# Patient Record
Sex: FEMALE | Race: WHITE
[De-identification: names, ages, dates, MRNs, and addresses within clinical notes are randomized per-mention and may not be internally consistent; named-entity substitution may affect disease eponyms.]

---

## 2017-02-21 ENCOUNTER — HOSPITAL ENCOUNTER (INPATIENT)
Dept: HOSPITAL 23 - CED | Age: 42
LOS: 2 days | Discharge: HOME | DRG: 205 | End: 2017-02-23
Admitting: INTERNAL MEDICINE
Payer: MEDICARE

## 2017-02-21 DIAGNOSIS — I13.2: ICD-10-CM

## 2017-02-21 DIAGNOSIS — E87.5: ICD-10-CM

## 2017-02-21 DIAGNOSIS — I27.2: ICD-10-CM

## 2017-02-21 DIAGNOSIS — G43.909: ICD-10-CM

## 2017-02-21 DIAGNOSIS — Z88.2: ICD-10-CM

## 2017-02-21 DIAGNOSIS — F17.210: ICD-10-CM

## 2017-02-21 DIAGNOSIS — I42.0: ICD-10-CM

## 2017-02-21 DIAGNOSIS — K44.9: ICD-10-CM

## 2017-02-21 DIAGNOSIS — M31.1: ICD-10-CM

## 2017-02-21 DIAGNOSIS — Z99.2: ICD-10-CM

## 2017-02-21 DIAGNOSIS — Z82.49: ICD-10-CM

## 2017-02-21 DIAGNOSIS — Z91.041: ICD-10-CM

## 2017-02-21 DIAGNOSIS — E87.2: ICD-10-CM

## 2017-02-21 DIAGNOSIS — R10.9: ICD-10-CM

## 2017-02-21 DIAGNOSIS — I50.32: ICD-10-CM

## 2017-02-21 DIAGNOSIS — Z84.1: ICD-10-CM

## 2017-02-21 DIAGNOSIS — Z88.5: ICD-10-CM

## 2017-02-21 DIAGNOSIS — N18.6: ICD-10-CM

## 2017-02-21 DIAGNOSIS — K21.9: ICD-10-CM

## 2017-02-21 DIAGNOSIS — R07.89: ICD-10-CM

## 2017-02-21 DIAGNOSIS — J98.11: Primary | ICD-10-CM

## 2017-02-21 LAB
%MB: 2 % (ref 0–4)
BARBITURATES UR QL SCN: 0.7 MG/DL (ref 0.2–2)
BARBITURATES UR QL SCN: 3.7 G/DL (ref 3.5–5)
BASOPHIL#: 0.1 X10E3 (ref 0–0.3)
BASOPHIL%: 0.8 % (ref 0–2.5)
BENZODIAZ UR QL SCN: 11 U/L (ref 10–40)
BENZODIAZ UR QL SCN: 18 U/L (ref 10–42)
BLOOD UREA NITROGEN: 68 MG/DL (ref 9–23)
BUN/CREATININE RATIO: 4.56
BZE UR QL SCN: 78 U/L (ref 32–92)
CALCIUM SERUM: 9 MG/DL (ref 8.4–10.2)
CK MB SERPL-RTO: 18.4 % (ref 11–15.5)
CK MB SERPL-RTO: 31.7 G/DL (ref 30–36)
CK TOTAL: 70 IU/L (ref 26–140)
CREATININE SERUM: 14.9 MG/DL (ref 0.6–1.4)
DIFF IND: NO
EOSINOPHIL#: 0.1 X10E3 (ref 0–0.7)
EOSINOPHIL%: 1.3 % (ref 0–7)
GENTAMICIN PEAK SERPL-MCNC: NO MG/L
GLOM FILT RATE ESTIMATED: 2.9 ML/MIN (ref 60–?)
GLUCOSE FASTING: 94 MG/DL (ref 70–110)
HEMATOCRIT: 37.1 % (ref 35–45)
HEMOGLOBIN: 11.8 GM/DL (ref 12–16)
KETONES UR QL: 20 MMOL/L (ref 22–31)
KETONES UR QL: 99 MMOL/L (ref 100–111)
LYMPHOCYTE#: 1.1 X10E3 (ref 1–3.5)
LYMPHOCYTE%: 15.4 % (ref 17–45)
MB: 1.4 NG/ML
MEAN CELL VOLUME: 96.3 FL (ref 83–96)
MEAN CORPUSCULAR HEMOGLOBIN: 30.6 PG (ref 28–34)
MEAN PLATELET VOLUME: 9.1 FL (ref 6.5–11.5)
METHADONE UR QL SCN: <1 NG/ML (ref 0–7.9)
MONOCYTE#: 0.7 X10E3 (ref 0–1)
MONOCYTE%: 9.4 % (ref 3–12)
NEUTROPHIL#: 5.2 X10E3 (ref 1.5–7.1)
NEUTROPHIL%: 73.1 % (ref 40–75)
PLATELET COUNT: 120 X10E3 (ref 140–420)
POC - TROPONIN: <0.05 NG/ML (ref ?–0.05)
POTASSIUM: 5.9 MMOL/L (ref 3.5–5.1)
PROTEIN TOTAL SERUM: 7 G/DL (ref 6–8.3)
RED BLOOD COUNT: 3.86 X10E (ref 3.9–5.3)
SODIUM: 133 MMOL/L (ref 135–145)
URBCS1 AUWI: (no result) /[HPF] (ref 0–2)
URINE APPEARANCE: CLEAR
URINE BACTERIA AUWI: (no result)
URINE BILIRUBIN: (no result)
URINE BLOOD: (no result)
URINE COLOR: YELLOW
URINE GLUCOSE: (no result) MG/DL
URINE KETONE: (no result)
URINE LEUKOCYTE ESTERASE: (no result)
URINE NITRATE: (no result)
URINE PH: 7.5 (ref 5–8)
URINE PROTEIN: (no result)
URINE SOURCE: (no result)
URINE SPECIFIC GRAVITY: 1.01 (ref 1–1.03)
URINE SQUAMOUS EPITHELIAL CELL: (no result) /[HPF]
URINE UROBILINOGEN: 0.2 MG/DL
UWBCS1 AUWI: (no result) (ref 0–5)
WHITE BLOOD COUNT: 7.1 X10E3 (ref 4–10.5)

## 2017-02-21 PROCEDURE — 5A1D00Z: ICD-10-PCS | Performed by: INTERNAL MEDICINE

## 2017-02-22 LAB
BLOOD UREA NITROGEN: 21 MG/DL (ref 9–23)
BUN/CREATININE RATIO: 2.76
CALCIUM SERUM: 9 MG/DL (ref 8.4–10.2)
CHOLESTEROL: 183 MG/DL (ref 0–200)
CK MB SERPL-RTO: 18.4 % (ref 11–15.5)
CK MB SERPL-RTO: 32.4 G/DL (ref 30–36)
CREATININE SERUM: 7.6 MG/DL (ref 0.6–1.4)
GLOM FILT RATE ESTIMATED: 6.3 ML/MIN (ref 60–?)
GLUCOSE FASTING: 94 MG/DL (ref 70–110)
HDL CHOLESTEROL: 41 MG/DL (ref 35–95)
HEMATOCRIT: 39.5 % (ref 35–45)
HEMOGLOBIN: 12.8 GM/DL (ref 12–16)
KETONES UR QL: 29 MMOL/L (ref 22–31)
KETONES UR QL: 96 MMOL/L (ref 100–111)
LDL CHOLESTEROL: 126 MG/DL (ref ?–130)
LDL/HDL RATIO: 3 RATIO (ref 0–4)
MEAN CELL VOLUME: 95.4 FL (ref 83–96)
MEAN CORPUSCULAR HEMOGLOBIN: 30.9 PG (ref 28–34)
MEAN PLATELET VOLUME: 8.8 FL (ref 6.5–11.5)
PLATELET COUNT: 109 X10E3 (ref 140–420)
POTASSIUM: 4.6 MMOL/L (ref 3.5–5.1)
RED BLOOD COUNT: 4.14 X10E (ref 3.9–5.3)
SODIUM: 138 MMOL/L (ref 135–145)
TRIGLYCERIDES: 79 MG/DL (ref 10–160)
WHITE BLOOD COUNT: 5.3 X10E3 (ref 4–10.5)

## 2017-02-23 LAB
BLOOD UREA NITROGEN: 18 MG/DL (ref 9–23)
BUN/CREATININE RATIO: 2.81
CALCIUM SERUM: 9 MG/DL (ref 8.4–10.2)
CK MB SERPL-RTO: 17.9 % (ref 11–15.5)
CK MB SERPL-RTO: 32.1 G/DL (ref 30–36)
CREATININE SERUM: 6.4 MG/DL (ref 0.6–1.4)
GLOM FILT RATE ESTIMATED: 7.7 ML/MIN (ref 60–?)
GLUCOSE FASTING: 97 MG/DL (ref 70–110)
HEMATOCRIT: 38.8 % (ref 35–45)
HEMOGLOBIN: 12.5 GM/DL (ref 12–16)
KETONES UR QL: 28 MMOL/L (ref 22–31)
KETONES UR QL: 98 MMOL/L (ref 100–111)
MEAN CELL VOLUME: 95.6 FL (ref 83–96)
MEAN CORPUSCULAR HEMOGLOBIN: 30.7 PG (ref 28–34)
MEAN PLATELET VOLUME: 9.1 FL (ref 6.5–11.5)
PLATELET COUNT: 105 X10E3 (ref 140–420)
POTASSIUM: 4.3 MMOL/L (ref 3.5–5.1)
RED BLOOD COUNT: 4.06 X10E (ref 3.9–5.3)
SODIUM: 140 MMOL/L (ref 135–145)
WHITE BLOOD COUNT: 5.6 X10E3 (ref 4–10.5)

## 2017-03-21 ENCOUNTER — HOSPITAL ENCOUNTER (INPATIENT)
Dept: HOSPITAL 23 - CED | Age: 42
LOS: 5 days | Discharge: HOME | DRG: 690 | End: 2017-03-26
Admitting: INTERNAL MEDICINE
Payer: MEDICARE

## 2017-03-21 DIAGNOSIS — N12: Primary | ICD-10-CM

## 2017-03-21 DIAGNOSIS — Z90.49: ICD-10-CM

## 2017-03-21 DIAGNOSIS — E87.5: ICD-10-CM

## 2017-03-21 DIAGNOSIS — G89.29: ICD-10-CM

## 2017-03-21 DIAGNOSIS — K44.9: ICD-10-CM

## 2017-03-21 DIAGNOSIS — G43.909: ICD-10-CM

## 2017-03-21 DIAGNOSIS — I27.2: ICD-10-CM

## 2017-03-21 DIAGNOSIS — R19.7: ICD-10-CM

## 2017-03-21 DIAGNOSIS — Z90.710: ICD-10-CM

## 2017-03-21 DIAGNOSIS — Z82.49: ICD-10-CM

## 2017-03-21 DIAGNOSIS — Z72.0: ICD-10-CM

## 2017-03-21 DIAGNOSIS — B96.20: ICD-10-CM

## 2017-03-21 DIAGNOSIS — I12.0: ICD-10-CM

## 2017-03-21 DIAGNOSIS — Z84.1: ICD-10-CM

## 2017-03-21 DIAGNOSIS — K21.9: ICD-10-CM

## 2017-03-21 DIAGNOSIS — N18.6: ICD-10-CM

## 2017-03-21 DIAGNOSIS — E83.39: ICD-10-CM

## 2017-03-21 DIAGNOSIS — Z86.711: ICD-10-CM

## 2017-03-21 LAB
BARBITURATES UR QL SCN: 0.4 MG/DL (ref 0.2–2)
BARBITURATES UR QL SCN: 3.7 G/DL (ref 3.5–5)
BASOPHIL#: 0.1 X10E3 (ref 0–0.3)
BASOPHIL%: 0.8 % (ref 0–2.5)
BENZODIAZ UR QL SCN: 14 U/L (ref 10–40)
BENZODIAZ UR QL SCN: 18 U/L (ref 10–42)
BLOOD UREA NITROGEN: 50 MG/DL (ref 9–23)
BUN/CREATININE RATIO: 5.26
BZE UR QL SCN: 72 U/L (ref 32–92)
CALCIUM SERUM: 9.2 MG/DL (ref 8.4–10.2)
CK MB SERPL-RTO: 18.9 % (ref 11–15.5)
CK MB SERPL-RTO: 31.3 G/DL (ref 30–36)
CREATININE SERUM: 9.5 MG/DL (ref 0.6–1.4)
DIFF IND: NO
EOSINOPHIL#: 0.1 X10E3 (ref 0–0.7)
EOSINOPHIL%: 1.8 % (ref 0–7)
GENTAMICIN PEAK SERPL-MCNC: YES MG/L
GLOM FILT RATE ESTIMATED: 4.9 ML/MIN (ref 60–?)
GLUCOSE FASTING: 89 MG/DL (ref 70–110)
HEMATOCRIT: 38.3 % (ref 35–45)
HEMOGLOBIN: 12 GM/DL (ref 12–16)
HIV1+2 AB SPEC QL IA.RAPID: 25.7 SECONDS (ref 23.5–31.3)
INFLUENZA A: (no result)
INFLUENZA B: (no result)
INR: 1
KETONES UR QL: 100 MMOL/L (ref 100–111)
KETONES UR QL: 27 MMOL/L (ref 22–31)
LYMPHOCYTE#: 1.4 X10E3 (ref 1–3.5)
LYMPHOCYTE%: 20.2 % (ref 17–45)
MEAN CELL VOLUME: 91.9 FL (ref 83–96)
MEAN CORPUSCULAR HEMOGLOBIN: 28.8 PG (ref 28–34)
MEAN PLATELET VOLUME: 9 FL (ref 6.5–11.5)
METHADONE UR QL SCN: <1 NG/ML (ref 0–7.9)
MONOCYTE#: 0.5 X10E3 (ref 0–1)
MONOCYTE%: 7.7 % (ref 3–12)
NEUTROPHIL#: 4.9 X10E3 (ref 1.5–7.1)
NEUTROPHIL%: 69.5 % (ref 40–75)
PLATELET COUNT: 123 X10E3 (ref 140–420)
POC - TROPONIN: <0.05 NG/ML (ref ?–0.05)
POTASSIUM: 4.9 MMOL/L (ref 3.5–5.1)
PROTEIN TOTAL SERUM: 7.4 G/DL (ref 6–8.3)
PROTHROMBIN TIME (PATIENT): 10.8 SECONDS (ref 9.6–11.5)
RED BLOOD COUNT: 4.17 X10E (ref 3.9–5.3)
SODIUM: 138 MMOL/L (ref 135–145)
U HYALINE CASTS AUWI: (no result) /[LPF]
URINE APPEARANCE: (no result)
URINE BACTERIA AUWI: (no result)
URINE BILIRUBIN: (no result)
URINE BLOOD: (no result)
URINE COLOR: YELLOW
URINE GLUCOSE: (no result) MG/DL
URINE KETONE: (no result)
URINE LEUKOCYTE ESTERASE: (no result)
URINE NITRATE: (no result)
URINE PH: 8.5 (ref 5–8)
URINE PROTEIN: (no result)
URINE SOURCE: (no result)
URINE SPECIFIC GRAVITY: 1.01 (ref 1–1.03)
URINE SQUAMOUS EPITHELIAL CELL: (no result) /[HPF]
URINE UROBILINOGEN: 0.2 MG/DL
UWBCS1 AUWI: (no result) (ref 0–5)
WHITE BLOOD COUNT: 7.1 X10E3 (ref 4–10.5)

## 2017-03-21 PROCEDURE — A9567 TECHNETIUM TC-99M AEROSOL: HCPCS

## 2017-03-21 PROCEDURE — A9540 TC99M MAA: HCPCS

## 2017-03-22 LAB
BASOPHIL#: 0 X10E3 (ref 0–0.3)
BASOPHIL%: 0.8 % (ref 0–2.5)
BLOOD UREA NITROGEN: 55 MG/DL (ref 9–23)
BUN/CREATININE RATIO: 5.39
CALCIUM SERUM: 9 MG/DL (ref 8.4–10.2)
CK MB SERPL-RTO: 18.5 % (ref 11–15.5)
CK MB SERPL-RTO: 31.2 G/DL (ref 30–36)
CREATININE SERUM: 10.2 MG/DL (ref 0.6–1.4)
DIFF IND: NO
EOSINOPHIL#: 0.1 X10E3 (ref 0–0.7)
EOSINOPHIL%: 1.7 % (ref 0–7)
GLOM FILT RATE ESTIMATED: 4.5 ML/MIN (ref 60–?)
GLUCOSE FASTING: 111 MG/DL (ref 70–110)
HEMATOCRIT: 35.4 % (ref 35–45)
HEMOGLOBIN: 11 GM/DL (ref 12–16)
KETONES UR QL: 28 MMOL/L (ref 22–31)
KETONES UR QL: 99 MMOL/L (ref 100–111)
LYMPHOCYTE#: 1.4 X10E3 (ref 1–3.5)
LYMPHOCYTE%: 21.8 % (ref 17–45)
MEAN CELL VOLUME: 93 FL (ref 83–96)
MEAN CORPUSCULAR HEMOGLOBIN: 29 PG (ref 28–34)
MEAN PLATELET VOLUME: 9.1 FL (ref 6.5–11.5)
MONOCYTE#: 0.5 X10E3 (ref 0–1)
MONOCYTE%: 7.6 % (ref 3–12)
NEUTROPHIL#: 4.3 X10E3 (ref 1.5–7.1)
NEUTROPHIL%: 68.1 % (ref 40–75)
PLATELET COUNT: 109 X10E3 (ref 140–420)
POTASSIUM: 4.6 MMOL/L (ref 3.5–5.1)
RED BLOOD COUNT: 3.81 X10E (ref 3.9–5.3)
SODIUM: 137 MMOL/L (ref 135–145)
WHITE BLOOD COUNT: 6.2 X10E3 (ref 4–10.5)

## 2017-03-22 PROCEDURE — 02HV33Z INSERTION OF INFUSION DEVICE INTO SUPERIOR VENA CAVA, PERCUTANEOUS APPROACH: ICD-10-PCS | Performed by: INTERNAL MEDICINE

## 2017-03-22 PROCEDURE — 5A1D60Z: ICD-10-PCS | Performed by: INTERNAL MEDICINE

## 2017-03-23 LAB
BASOPHIL#: 0 X10E3 (ref 0–0.3)
BASOPHIL%: 0.7 % (ref 0–2.5)
BLOOD UREA NITROGEN: 33 MG/DL (ref 9–23)
BUN/CREATININE RATIO: 4.64
CALCIUM SERUM: 8.8 MG/DL (ref 8.4–10.2)
CK MB SERPL-RTO: 18.6 % (ref 11–15.5)
CK MB SERPL-RTO: 30.7 G/DL (ref 30–36)
CREATININE SERUM: 7.1 MG/DL (ref 0.6–1.4)
DIFF IND: NO
EOSINOPHIL#: 0.1 X10E3 (ref 0–0.7)
EOSINOPHIL%: 1.3 % (ref 0–7)
GLOM FILT RATE ESTIMATED: 6.8 ML/MIN (ref 60–?)
GLUCOSE FASTING: 92 MG/DL (ref 70–110)
HEMATOCRIT: 37.8 % (ref 35–45)
HEMOGLOBIN: 11.6 GM/DL (ref 12–16)
KETONES UR QL: 101 MMOL/L (ref 100–111)
KETONES UR QL: 25 MMOL/L (ref 22–31)
LYMPHOCYTE#: 1.2 X10E3 (ref 1–3.5)
LYMPHOCYTE%: 18 % (ref 17–45)
MEAN CELL VOLUME: 93.3 FL (ref 83–96)
MEAN CORPUSCULAR HEMOGLOBIN: 28.7 PG (ref 28–34)
MEAN PLATELET VOLUME: 10 FL (ref 6.5–11.5)
MONOCYTE#: 0.6 X10E3 (ref 0–1)
MONOCYTE%: 8.6 % (ref 3–12)
NEUTROPHIL#: 4.6 X10E3 (ref 1.5–7.1)
NEUTROPHIL%: 71.4 % (ref 40–75)
PLATELET COUNT: 115 X10E3 (ref 140–420)
POTASSIUM: 5.8 MMOL/L (ref 3.5–5.1)
RED BLOOD COUNT: 4.05 X10E (ref 3.9–5.3)
SODIUM: 136 MMOL/L (ref 135–145)
WHITE BLOOD COUNT: 6.5 X10E3 (ref 4–10.5)

## 2017-03-24 LAB
BASOPHIL#: 0 X10E3 (ref 0–0.3)
BASOPHIL%: 0.6 % (ref 0–2.5)
BLOOD UREA NITROGEN: 48 MG/DL (ref 9–23)
BUN/CREATININE RATIO: 5.1
CALCIUM SERUM: 8.4 MG/DL (ref 8.4–10.2)
CK MB SERPL-RTO: 18.5 % (ref 11–15.5)
CK MB SERPL-RTO: 31.5 G/DL (ref 30–36)
CREATININE SERUM: 9.4 MG/DL (ref 0.6–1.4)
DIFF IND: NO
EOSINOPHIL#: 0.2 X10E3 (ref 0–0.7)
EOSINOPHIL%: 2.7 % (ref 0–7)
GLOM FILT RATE ESTIMATED: 4.7 ML/MIN (ref 60–?)
GLUCOSE FASTING: 93 MG/DL (ref 70–110)
HEMATOCRIT: 34.9 % (ref 35–45)
HEMOGLOBIN: 11 GM/DL (ref 12–16)
HEPATITIS B SURFACE ANTIBODY: <5 MIU/ML (ref 10–?)
KETONES UR QL: 24 MMOL/L (ref 22–31)
KETONES UR QL: 99 MMOL/L (ref 100–111)
LYMPHOCYTE#: 1.5 X10E3 (ref 1–3.5)
LYMPHOCYTE%: 21.2 % (ref 17–45)
MEAN CELL VOLUME: 91.8 FL (ref 83–96)
MEAN CORPUSCULAR HEMOGLOBIN: 28.9 PG (ref 28–34)
MEAN PLATELET VOLUME: 8.9 FL (ref 6.5–11.5)
MONOCYTE#: 0.5 X10E3 (ref 0–1)
MONOCYTE%: 7.5 % (ref 3–12)
NEUTROPHIL#: 4.9 X10E3 (ref 1.5–7.1)
NEUTROPHIL%: 68 % (ref 40–75)
PHOSPHOROUS: 5.9 MG/DL (ref 2.5–4.6)
PLATELET COUNT: 109 X10E3 (ref 140–420)
POTASSIUM: 5.6 MMOL/L (ref 3.5–5.1)
RED BLOOD COUNT: 3.8 X10E (ref 3.9–5.3)
SODIUM: 134 MMOL/L (ref 135–145)
WHITE BLOOD COUNT: 7.1 X10E3 (ref 4–10.5)

## 2017-03-25 LAB
BLOOD UREA NITROGEN: 23 MG/DL (ref 9–23)
BUN/CREATININE RATIO: 3.83
CALCIUM SERUM: 9 MG/DL (ref 8.4–10.2)
CREATININE SERUM: 6 MG/DL (ref 0.6–1.4)
GLOM FILT RATE ESTIMATED: 8.1 ML/MIN (ref 60–?)
GLUCOSE FASTING: 94 MG/DL (ref 70–110)
KETONES UR QL: 100 MMOL/L (ref 100–111)
KETONES UR QL: 28 MMOL/L (ref 22–31)
MAGNESIUM: 1.9 MG/DL (ref 1.6–3)
PHOSPHOROUS: 4.8 MG/DL (ref 2.5–4.6)
POTASSIUM: 4.6 MMOL/L (ref 3.5–5.1)
SODIUM: 138 MMOL/L (ref 135–145)

## 2017-03-26 LAB
BASOPHIL#: 0.1 X10E3 (ref 0–0.3)
BASOPHIL%: 1.2 % (ref 0–2.5)
BLOOD UREA NITROGEN: 35 MG/DL (ref 9–23)
BUN/CREATININE RATIO: 4.32
CALCIUM SERUM: 9 MG/DL (ref 8.4–10.2)
CK MB SERPL-RTO: 17.9 % (ref 11–15.5)
CK MB SERPL-RTO: 31.2 G/DL (ref 30–36)
CREATININE SERUM: 8.1 MG/DL (ref 0.6–1.4)
DIFF IND: NO
EOSINOPHIL#: 0.2 X10E3 (ref 0–0.7)
EOSINOPHIL%: 4.2 % (ref 0–7)
GLOM FILT RATE ESTIMATED: 5.6 ML/MIN (ref 60–?)
GLUCOSE FASTING: 107 MG/DL (ref 70–110)
HEMATOCRIT: 35.7 % (ref 35–45)
HEMOGLOBIN: 11.1 GM/DL (ref 12–16)
KETONES UR QL: 103 MMOL/L (ref 100–111)
KETONES UR QL: 26 MMOL/L (ref 22–31)
LYMPHOCYTE#: 1.2 X10E3 (ref 1–3.5)
LYMPHOCYTE%: 23 % (ref 17–45)
MEAN CELL VOLUME: 91.8 FL (ref 83–96)
MEAN CORPUSCULAR HEMOGLOBIN: 28.6 PG (ref 28–34)
MEAN PLATELET VOLUME: 9.8 FL (ref 6.5–11.5)
MONOCYTE#: 0.5 X10E3 (ref 0–1)
MONOCYTE%: 9.4 % (ref 3–12)
NEUTROPHIL#: 3.3 X10E3 (ref 1.5–7.1)
NEUTROPHIL%: 62.2 % (ref 40–75)
PLATELET COUNT: 101 X10E3 (ref 140–420)
POTASSIUM: 4.8 MMOL/L (ref 3.5–5.1)
RED BLOOD COUNT: 3.89 X10E (ref 3.9–5.3)
SODIUM: 139 MMOL/L (ref 135–145)
WHITE BLOOD COUNT: 5.4 X10E3 (ref 4–10.5)

## 2017-04-06 ENCOUNTER — INPATIENT HOSPITAL (OUTPATIENT)
Dept: URBAN - METROPOLITAN AREA HOSPITAL 107 | Facility: HOSPITAL | Age: 42
End: 2017-04-06

## 2017-04-06 DIAGNOSIS — R10.32 LEFT LOWER QUADRANT PAIN: ICD-10-CM

## 2017-04-06 DIAGNOSIS — R93.3 ABNORMAL FINDINGS ON DIAGNOSTIC IMAGING OF OTHER PARTS OF DI: ICD-10-CM

## 2017-04-06 PROCEDURE — 99223 1ST HOSP IP/OBS HIGH 75: CPT

## 2017-05-07 ENCOUNTER — HOSPITAL ENCOUNTER (OUTPATIENT)
Dept: HOSPITAL 23 - CED | Age: 42
Setting detail: OBSERVATION
LOS: 3 days | Discharge: HOME | End: 2017-05-10
Attending: INTERNAL MEDICINE | Admitting: FAMILY MEDICINE
Payer: MEDICARE

## 2017-05-07 DIAGNOSIS — Z91.041: ICD-10-CM

## 2017-05-07 DIAGNOSIS — I13.2: ICD-10-CM

## 2017-05-07 DIAGNOSIS — J98.4: ICD-10-CM

## 2017-05-07 DIAGNOSIS — N39.0: ICD-10-CM

## 2017-05-07 DIAGNOSIS — Z98.890: ICD-10-CM

## 2017-05-07 DIAGNOSIS — Z88.5: ICD-10-CM

## 2017-05-07 DIAGNOSIS — Z88.1: ICD-10-CM

## 2017-05-07 DIAGNOSIS — I31.3: ICD-10-CM

## 2017-05-07 DIAGNOSIS — R07.89: Primary | ICD-10-CM

## 2017-05-07 DIAGNOSIS — M41.86: ICD-10-CM

## 2017-05-07 DIAGNOSIS — Z90.49: ICD-10-CM

## 2017-05-07 DIAGNOSIS — Z88.8: ICD-10-CM

## 2017-05-07 DIAGNOSIS — M31.1: ICD-10-CM

## 2017-05-07 DIAGNOSIS — Z88.2: ICD-10-CM

## 2017-05-07 DIAGNOSIS — Z79.899: ICD-10-CM

## 2017-05-07 DIAGNOSIS — I50.30: ICD-10-CM

## 2017-05-07 DIAGNOSIS — J98.11: ICD-10-CM

## 2017-05-07 DIAGNOSIS — F17.210: ICD-10-CM

## 2017-05-07 DIAGNOSIS — K44.9: ICD-10-CM

## 2017-05-07 DIAGNOSIS — G43.909: ICD-10-CM

## 2017-05-07 DIAGNOSIS — D63.1: ICD-10-CM

## 2017-05-07 DIAGNOSIS — B95.2: ICD-10-CM

## 2017-05-07 DIAGNOSIS — A41.89: ICD-10-CM

## 2017-05-07 DIAGNOSIS — Z90.710: ICD-10-CM

## 2017-05-07 DIAGNOSIS — Z82.49: ICD-10-CM

## 2017-05-07 DIAGNOSIS — K21.9: ICD-10-CM

## 2017-05-07 DIAGNOSIS — N18.6: ICD-10-CM

## 2017-05-07 DIAGNOSIS — I51.7: ICD-10-CM

## 2017-05-07 DIAGNOSIS — Z99.2: ICD-10-CM

## 2017-05-07 DIAGNOSIS — Z84.1: ICD-10-CM

## 2017-05-07 LAB
ALCOHOL BLOOD: <5 MG/DL
BARBITURATES UR QL SCN: 0.5 MG/DL (ref 0.2–2)
BARBITURATES UR QL SCN: 3.9 G/DL (ref 3.5–5)
BARBITURATES: (no result)
BASOPHIL#: 0.1 X10E3 (ref 0–0.3)
BASOPHIL%: 1.5 % (ref 0–2.5)
BENZODIAZ UR QL SCN: 18 U/L (ref 10–40)
BENZODIAZ UR QL SCN: 21 U/L (ref 10–42)
BENZODIAZEPINES: (no result)
BLOOD UREA NITROGEN: 46 MG/DL (ref 9–23)
BUN/CREATININE RATIO: 5.05
BZE UR QL SCN: 95 U/L (ref 32–92)
CALCIUM SERUM: 9.4 MG/DL (ref 8.4–10.2)
CK MB SERPL-RTO: 20.1 % (ref 11–15.5)
CK MB SERPL-RTO: 31.8 G/DL (ref 30–36)
CK TOTAL: 30 IU/L (ref 26–140)
CK TOTAL: 31 IU/L (ref 26–140)
COCAINE: (no result)
CREATININE SERUM: 9.1 MG/DL (ref 0.6–1.4)
DIFF IND: NO
DX ICD CODE: (no result)
DX ICD CODE: (no result)
EOSINOPHIL#: 0.1 X10E3 (ref 0–0.7)
EOSINOPHIL%: 2 % (ref 0–7)
GENTAMICIN PEAK SERPL-MCNC: YES MG/L
GLOM FILT RATE ESTIMATED: 4.8 ML/MIN (ref 60–?)
GLUCOSE FASTING: 124 MG/DL (ref 70–110)
HEMATOCRIT: 37.9 % (ref 35–45)
HEMOGLOBIN: 12 GM/DL (ref 12–16)
HIV1+2 AB SPEC QL IA.RAPID: 20.9 SECONDS (ref 23.5–31.3)
INR: 1
KETONES UR QL: 24 MMOL/L (ref 22–31)
KETONES UR QL: 93 MMOL/L (ref 100–111)
LIPASE: 66 U/L (ref 22–51)
LYMPHOCYTE#: 1.9 X10E3 (ref 1–3.5)
LYMPHOCYTE%: 28 % (ref 17–45)
MEAN CELL VOLUME: 94.4 FL (ref 83–96)
MEAN CORPUSCULAR HEMOGLOBIN: 30 PG (ref 28–34)
MEAN PLATELET VOLUME: 8.9 FL (ref 6.5–11.5)
METHADONE UR QL SCN: <1 NG/ML (ref 0–7.9)
METHADONE UR QL SCN: <1 NG/ML (ref 0–7.9)
MONOCYTE#: 0.7 X10E3 (ref 0–1)
MONOCYTE%: 10.2 % (ref 3–12)
NEUTROPHIL#: 4 X10E3 (ref 1.5–7.1)
NEUTROPHIL%: 58.3 % (ref 40–75)
OPIATES: (no result)
PLATELET COUNT: 143 X10E3 (ref 140–420)
POC - TROPONIN: <0.05 NG/ML (ref ?–0.05)
POC - TROPONIN: <0.05 NG/ML (ref ?–0.05)
POTASSIUM: 4.1 MMOL/L (ref 3.5–5.1)
PROTEIN TOTAL SERUM: 7.4 G/DL (ref 6–8.3)
PROTHROMBIN TIME (PATIENT): 10.4 SECONDS (ref 9.6–11.5)
RED BLOOD COUNT: 4.02 X10E (ref 3.9–5.3)
SODIUM: 133 MMOL/L (ref 135–145)
TRICYCLIC ANTIDEPRESSANTS: (no result)
U METHADONE: (no result)
URBCS1 AUWI: (no result) /[HPF] (ref 0–2)
URINE AMORPHOUS SEDIMENT: (no result)
URINE APPEARANCE: (no result)
URINE BACTERIA AUWI: (no result)
URINE BILIRUBIN: (no result)
URINE BLOOD: (no result)
URINE COLOR: YELLOW
URINE GLUCOSE: (no result) MG/DL
URINE KETONE: (no result)
URINE LEUKOCYTE ESTERASE: (no result)
URINE NITRATE: (no result)
URINE PH: 7 (ref 5–8)
URINE PROTEIN: (no result)
URINE SOURCE: (no result)
URINE SPECIFIC GRAVITY: 1.01 (ref 1–1.03)
URINE SQUAMOUS EPITHELIAL CELL: (no result) /[HPF]
URINE UROBILINOGEN: 1 MG/DL
UWBCS1 AUWI: (no result) (ref 0–5)
WHITE BLOOD COUNT: 6.8 X10E3 (ref 4–10.5)

## 2017-05-07 PROCEDURE — G0378 HOSPITAL OBSERVATION PER HR: HCPCS

## 2017-05-07 PROCEDURE — G0480 DRUG TEST DEF 1-7 CLASSES: HCPCS

## 2017-05-07 PROCEDURE — C9113 INJ PANTOPRAZOLE SODIUM, VIA: HCPCS

## 2017-05-08 LAB
AMYLASE: 40 U/L (ref 0–46)
BLOOD UREA NITROGEN: 60 MG/DL (ref 9–23)
BUN/CREATININE RATIO: 5.4
CALCIUM SERUM: 8.6 MG/DL (ref 8.4–10.2)
CK MB SERPL-RTO: 20.2 % (ref 11–15.5)
CK MB SERPL-RTO: 31.8 G/DL (ref 30–36)
CREATININE SERUM: 11.1 MG/DL (ref 0.6–1.4)
GLOM FILT RATE ESTIMATED: 3.8 ML/MIN (ref 60–?)
GLUCOSE FASTING: 101 MG/DL (ref 70–110)
HEMATOCRIT: 37.6 % (ref 35–45)
HEMOGLOBIN: 11.9 GM/DL (ref 12–16)
KETONES UR QL: 25 MMOL/L (ref 22–31)
KETONES UR QL: 95 MMOL/L (ref 100–111)
LIPASE: 35 U/L (ref 22–51)
MEAN CELL VOLUME: 94.9 FL (ref 83–96)
MEAN CORPUSCULAR HEMOGLOBIN: 30.1 PG (ref 28–34)
MEAN PLATELET VOLUME: 9 FL (ref 6.5–11.5)
PLATELET COUNT: 128 X10E3 (ref 140–420)
POTASSIUM: 5.9 MMOL/L (ref 3.5–5.1)
RED BLOOD COUNT: 3.96 X10E (ref 3.9–5.3)
SODIUM: 134 MMOL/L (ref 135–145)
WHITE BLOOD COUNT: 7.3 X10E3 (ref 4–10.5)

## 2017-07-07 ENCOUNTER — HOSPITAL ENCOUNTER (INPATIENT)
Dept: HOSPITAL 23 - CED | Age: 42
LOS: 6 days | Discharge: HOME | DRG: 314 | End: 2017-07-13
Attending: INTERNAL MEDICINE | Admitting: FAMILY MEDICINE
Payer: MEDICARE

## 2017-07-07 DIAGNOSIS — Z91.15: ICD-10-CM

## 2017-07-07 DIAGNOSIS — I31.9: Primary | ICD-10-CM

## 2017-07-07 DIAGNOSIS — I50.32: ICD-10-CM

## 2017-07-07 DIAGNOSIS — Z90.710: ICD-10-CM

## 2017-07-07 DIAGNOSIS — N39.0: ICD-10-CM

## 2017-07-07 DIAGNOSIS — B95.2: ICD-10-CM

## 2017-07-07 DIAGNOSIS — Z88.2: ICD-10-CM

## 2017-07-07 DIAGNOSIS — I13.2: ICD-10-CM

## 2017-07-07 DIAGNOSIS — M94.0: ICD-10-CM

## 2017-07-07 DIAGNOSIS — Z99.2: ICD-10-CM

## 2017-07-07 DIAGNOSIS — D63.1: ICD-10-CM

## 2017-07-07 DIAGNOSIS — Z86.711: ICD-10-CM

## 2017-07-07 DIAGNOSIS — R07.89: ICD-10-CM

## 2017-07-07 DIAGNOSIS — N18.6: ICD-10-CM

## 2017-07-07 DIAGNOSIS — G89.4: ICD-10-CM

## 2017-07-07 DIAGNOSIS — Z90.49: ICD-10-CM

## 2017-07-07 DIAGNOSIS — G43.909: ICD-10-CM

## 2017-07-07 DIAGNOSIS — Z72.0: ICD-10-CM

## 2017-07-07 DIAGNOSIS — K21.9: ICD-10-CM

## 2017-07-07 LAB
AMYLASE: 54 U/L (ref 0–46)
BARBITURATES UR QL SCN: 0.7 MG/DL (ref 0.2–2)
BARBITURATES UR QL SCN: 3.8 G/DL (ref 3.5–5)
BASOPHIL#: 0.1 X10E3 (ref 0–0.3)
BASOPHIL%: 1.6 % (ref 0–2.5)
BENZODIAZ UR QL SCN: 16 U/L (ref 10–42)
BENZODIAZ UR QL SCN: 25 U/L (ref 10–40)
BLOOD UREA NITROGEN: 37 MG/DL (ref 9–23)
BUN/CREATININE RATIO: 4.15
BZE UR QL SCN: 92 U/L (ref 32–92)
CALCIUM SERUM: 8.4 MG/DL (ref 8.4–10.2)
CHOLESTEROL: 174 MG/DL (ref 0–200)
CK MB SERPL-RTO: 19.4 % (ref 11–15.5)
CK MB SERPL-RTO: 32.1 G/DL (ref 30–36)
CREATININE SERUM: 8.9 MG/DL (ref 0.6–1.4)
DIFF IND: NO
EOSINOPHIL#: 0.1 X10E3 (ref 0–0.7)
EOSINOPHIL%: 0.8 % (ref 0–7)
GENTAMICIN PEAK SERPL-MCNC: YES MG/L
GLOM FILT RATE ESTIMATED: 5 ML/MIN (ref 60–?)
GLUCOSE FASTING: 68 MG/DL (ref 70–110)
HDL CHOLESTEROL: 45 MG/DL (ref 35–95)
HEMATOCRIT: 26.9 % (ref 35–45)
HEMOGLOBIN: 8.6 GM/DL (ref 12–16)
KETONES UR QL: 29 MMOL/L (ref 22–31)
KETONES UR QL: 92 MMOL/L (ref 100–111)
LDL CHOLESTEROL: 117 MG/DL (ref ?–130)
LDL/HDL RATIO: 3 RATIO (ref 0–4)
LIPASE: 60 U/L (ref 22–51)
LYMPHOCYTE#: 1 X10E3 (ref 1–3.5)
LYMPHOCYTE%: 15.1 % (ref 17–45)
MAGNESIUM: 1.9 MG/DL (ref 1.6–3)
MEAN CELL VOLUME: 100 FL (ref 83–96)
MEAN CORPUSCULAR HEMOGLOBIN: 32.1 PG (ref 28–34)
MEAN PLATELET VOLUME: 8.9 FL (ref 6.5–11.5)
METHADONE UR QL SCN: <1 NG/ML (ref 0–7.9)
MONOCYTE#: 0.6 X10E3 (ref 0–1)
MONOCYTE%: 10.1 % (ref 3–12)
NEUTROPHIL#: 4.6 X10E3 (ref 1.5–7.1)
NEUTROPHIL%: 72.4 % (ref 40–75)
PHOSPHOROUS: 5.1 MG/DL (ref 2.5–4.6)
PLATELET COUNT: 152 X10E3 (ref 140–420)
POC - TROPONIN: <0.05 NG/ML (ref ?–0.05)
POTASSIUM: 3.6 MMOL/L (ref 3.5–5.1)
PROTEIN TOTAL SERUM: 7.5 G/DL (ref 6–8.3)
RED BLOOD COUNT: 2.69 X10E (ref 3.9–5.3)
SODIUM: 133 MMOL/L (ref 135–145)
TRIGLYCERIDES: 60 MG/DL (ref 10–160)
URBCS1 AUWI: (no result) /[HPF] (ref 0–2)
URINE APPEARANCE: (no result)
URINE BACTERIA AUWI: (no result)
URINE BILIRUBIN: (no result)
URINE BLOOD: (no result)
URINE COLOR: YELLOW
URINE GLUCOSE: (no result) MG/DL
URINE KETONE: (no result)
URINE LEUKOCYTE ESTERASE: (no result)
URINE NITRATE: (no result)
URINE PH: 8.5 (ref 5–8)
URINE PROTEIN: (no result)
URINE SOURCE: (no result)
URINE SPECIFIC GRAVITY: 1.01 (ref 1–1.03)
URINE SQUAMOUS EPITHELIAL CELL: (no result) /[HPF]
URINE UROBILINOGEN: 0.2 MG/DL
UWBCS1 AUWI: (no result) (ref 0–5)
WHITE BLOOD COUNT: 6.3 X10E3 (ref 4–10.5)

## 2017-07-07 PROCEDURE — C9113 INJ PANTOPRAZOLE SODIUM, VIA: HCPCS

## 2017-07-07 PROCEDURE — G0257 UNSCHED DIALYSIS ESRD PT HOS: HCPCS

## 2017-07-08 LAB
AMYLASE: 39 U/L (ref 0–46)
BARBITURATES UR QL SCN: 0.8 MG/DL (ref 0.2–2)
BARBITURATES UR QL SCN: 3.8 G/DL (ref 3.5–5)
BENZODIAZ UR QL SCN: 28 U/L (ref 10–40)
BENZODIAZ UR QL SCN: 28 U/L (ref 10–42)
BLOOD UREA NITROGEN: 14 MG/DL (ref 9–23)
BUN/CREATININE RATIO: 2.85
BZE UR QL SCN: 110 U/L (ref 32–92)
CALCIUM SERUM: 8.8 MG/DL (ref 8.4–10.2)
CK MB SERPL-RTO: 19.1 % (ref 11–15.5)
CK MB SERPL-RTO: 31.9 G/DL (ref 30–36)
CK TOTAL: 33 IU/L (ref 26–140)
CREATININE SERUM: 4.9 MG/DL (ref 0.6–1.4)
GLOM FILT RATE ESTIMATED: 10.2 ML/MIN (ref 60–?)
GLUCOSE FASTING: 80 MG/DL (ref 70–110)
HEMATOCRIT: 31.2 % (ref 35–45)
HEMOGLOBIN: 9.9 GM/DL (ref 12–16)
KETONES UR QL: 30 MMOL/L (ref 22–31)
KETONES UR QL: 95 MMOL/L (ref 100–111)
LIPASE: 26 U/L (ref 22–51)
MAGNESIUM: 2 MG/DL (ref 1.6–3)
MEAN CELL VOLUME: 100.3 FL (ref 83–96)
MEAN CORPUSCULAR HEMOGLOBIN: 32 PG (ref 28–34)
MEAN PLATELET VOLUME: 8.8 FL (ref 6.5–11.5)
PHOSPHOROUS: 3.9 MG/DL (ref 2.5–4.6)
PLATELET COUNT: 169 X10E3 (ref 140–420)
POTASSIUM: 4.4 MMOL/L (ref 3.5–5.1)
PROTEIN TOTAL SERUM: 8.1 G/DL (ref 6–8.3)
RED BLOOD COUNT: 3.11 X10E (ref 3.9–5.3)
SODIUM: 136 MMOL/L (ref 135–145)
WHITE BLOOD COUNT: 7.4 X10E3 (ref 4–10.5)

## 2017-07-09 LAB
BLOOD UREA NITROGEN: 18 MG/DL (ref 9–23)
BUN/CREATININE RATIO: 2.81
CALCIUM SERUM: 8.2 MG/DL (ref 8.4–10.2)
CK MB SERPL-RTO: 20 % (ref 11–15.5)
CK MB SERPL-RTO: 32 G/DL (ref 30–36)
CREATININE SERUM: 6.4 MG/DL (ref 0.6–1.4)
GLOM FILT RATE ESTIMATED: 7.4 ML/MIN (ref 60–?)
GLUCOSE FASTING: 110 MG/DL (ref 70–110)
HEMATOCRIT: 27.3 % (ref 35–45)
HEMOGLOBIN: 8.7 GM/DL (ref 12–16)
KETONES UR QL: 29 MMOL/L (ref 22–31)
KETONES UR QL: 94 MMOL/L (ref 100–111)
MEAN CELL VOLUME: 100.6 FL (ref 83–96)
MEAN CORPUSCULAR HEMOGLOBIN: 32.2 PG (ref 28–34)
MEAN PLATELET VOLUME: 8.9 FL (ref 6.5–11.5)
PLATELET COUNT: 148 X10E3 (ref 140–420)
POTASSIUM: 3.8 MMOL/L (ref 3.5–5.1)
RED BLOOD COUNT: 2.71 X10E (ref 3.9–5.3)
SODIUM: 135 MMOL/L (ref 135–145)
WHITE BLOOD COUNT: 5.5 X10E3 (ref 4–10.5)

## 2017-07-10 PROCEDURE — 5A1D60Z: ICD-10-PCS | Performed by: INTERNAL MEDICINE

## 2017-07-11 LAB
URBCS1 AUWI: (no result) /[HPF] (ref 0–2)
URINE APPEARANCE: (no result)
URINE BACTERIA AUWI: (no result)
URINE BILIRUBIN: (no result)
URINE BLOOD: (no result)
URINE COLOR: YELLOW
URINE GLUCOSE: (no result) MG/DL
URINE KETONE: (no result)
URINE LEUKOCYTE ESTERASE: (no result)
URINE NITRATE: (no result)
URINE PH: 8 (ref 5–8)
URINE PROTEIN: (no result)
URINE SOURCE: (no result)
URINE SPECIFIC GRAVITY: 1.01 (ref 1–1.03)
URINE SQUAMOUS EPITHELIAL CELL: (no result) /[HPF]
URINE UROBILINOGEN: 0.2 MG/DL
UWBCS1 AUWI: (no result) (ref 0–5)

## 2017-07-12 LAB
BASOPHIL#: 0 X10E3 (ref 0–0.3)
BASOPHIL%: 0.8 % (ref 0–2.5)
BLOOD UREA NITROGEN: 39 MG/DL (ref 9–23)
BUN/CREATININE RATIO: 4.38
CALCIUM SERUM: 8.7 MG/DL (ref 8.4–10.2)
CK MB SERPL-RTO: 19.4 % (ref 11–15.5)
CK MB SERPL-RTO: 32.3 G/DL (ref 30–36)
CREATININE SERUM: 8.9 MG/DL (ref 0.6–1.4)
DIFF IND: NO
EOSINOPHIL#: 0.1 X10E3 (ref 0–0.7)
EOSINOPHIL%: 1.3 % (ref 0–7)
GLOM FILT RATE ESTIMATED: 5 ML/MIN (ref 60–?)
GLUCOSE FASTING: 93 MG/DL (ref 70–110)
HEMATOCRIT: 31 % (ref 35–45)
HEMOGLOBIN: 10 GM/DL (ref 12–16)
KETONES UR QL: 24 MMOL/L (ref 22–31)
KETONES UR QL: 97 MMOL/L (ref 100–111)
LYMPHOCYTE#: 1.3 X10E3 (ref 1–3.5)
LYMPHOCYTE%: 21.7 % (ref 17–45)
MEAN CELL VOLUME: 99.7 FL (ref 83–96)
MEAN CORPUSCULAR HEMOGLOBIN: 32.2 PG (ref 28–34)
MEAN PLATELET VOLUME: 8 FL (ref 6.5–11.5)
MONOCYTE#: 0.5 X10E3 (ref 0–1)
MONOCYTE%: 8.6 % (ref 3–12)
NEUTROPHIL#: 4 X10E3 (ref 1.5–7.1)
NEUTROPHIL%: 67.6 % (ref 40–75)
PLATELET COUNT: 168 X10E3 (ref 140–420)
POTASSIUM: 4.6 MMOL/L (ref 3.5–5.1)
RED BLOOD COUNT: 3.11 X10E (ref 3.9–5.3)
SODIUM: 133 MMOL/L (ref 135–145)
WHITE BLOOD COUNT: 6 X10E3 (ref 4–10.5)

## 2017-07-24 ENCOUNTER — HOSPITAL ENCOUNTER (EMERGENCY)
Dept: HOSPITAL 23 - CED | Age: 42
LOS: 1 days | Discharge: HOME | End: 2017-07-25
Payer: MEDICARE

## 2017-07-24 DIAGNOSIS — G43.909: ICD-10-CM

## 2017-07-24 DIAGNOSIS — Z90.710: ICD-10-CM

## 2017-07-24 DIAGNOSIS — R07.9: Primary | ICD-10-CM

## 2017-07-24 DIAGNOSIS — G89.29: ICD-10-CM

## 2017-07-24 DIAGNOSIS — Z90.89: ICD-10-CM

## 2017-07-24 DIAGNOSIS — I10: ICD-10-CM

## 2017-07-24 DIAGNOSIS — R10.84: ICD-10-CM

## 2017-07-24 DIAGNOSIS — F17.210: ICD-10-CM

## 2017-07-24 DIAGNOSIS — K21.9: ICD-10-CM

## 2017-07-24 LAB
BASOPHIL#: 0.1 X10E3 (ref 0–0.3)
BASOPHIL%: 0.9 % (ref 0–2.5)
CK MB SERPL-RTO: 19 % (ref 11–15.5)
CK MB SERPL-RTO: 33.9 G/DL (ref 30–36)
DIFF IND: NO
EOSINOPHIL#: 0.1 X10E3 (ref 0–0.7)
EOSINOPHIL%: 1.3 % (ref 0–7)
HEMATOCRIT: 24.7 % (ref 35–45)
HEMOGLOBIN: 8.4 GM/DL (ref 12–16)
LYMPHOCYTE#: 1.5 X10E3 (ref 1–3.5)
LYMPHOCYTE%: 20.9 % (ref 17–45)
MEAN CELL VOLUME: 100.9 FL (ref 83–96)
MEAN CORPUSCULAR HEMOGLOBIN: 34.2 PG (ref 28–34)
MEAN PLATELET VOLUME: 9.2 FL (ref 6.5–11.5)
METHADONE UR QL SCN: <1 NG/ML (ref 0–7.9)
MONOCYTE#: 0.4 X10E3 (ref 0–1)
MONOCYTE%: 6.1 % (ref 3–12)
NEUTROPHIL#: 5.1 X10E3 (ref 1.5–7.1)
NEUTROPHIL%: 70.8 % (ref 40–75)
PLATELET COUNT: 152 X10E3 (ref 140–420)
POC - TROPONIN: <0.05 NG/ML (ref ?–0.05)
RED BLOOD COUNT: 2.44 X10E (ref 3.9–5.3)
WHITE BLOOD COUNT: 7.2 X10E3 (ref 4–10.5)

## 2017-07-24 PROCEDURE — A9567 TECHNETIUM TC-99M AEROSOL: HCPCS

## 2017-07-24 PROCEDURE — A9540 TC99M MAA: HCPCS

## 2017-07-25 LAB
BARBITURATES UR QL SCN: 0.8 MG/DL (ref 0.2–2)
BARBITURATES UR QL SCN: 3.6 G/DL (ref 3.5–5)
BENZODIAZ UR QL SCN: 15 U/L (ref 10–40)
BENZODIAZ UR QL SCN: 28 U/L (ref 10–42)
BLOOD UREA NITROGEN: 82 MG/DL (ref 9–23)
BUN/CREATININE RATIO: 5.85
BZE UR QL SCN: 97 U/L (ref 32–92)
CALCIUM SERUM: 7.6 MG/DL (ref 8.4–10.2)
CREATININE SERUM: 14 MG/DL (ref 0.6–1.4)
GENTAMICIN PEAK SERPL-MCNC: YES MG/L
GLOM FILT RATE ESTIMATED: 2.9 ML/MIN (ref 60–?)
GLUCOSE FASTING: 144 MG/DL (ref 70–110)
HIV1+2 AB SPEC QL IA.RAPID: 26.1 SECONDS (ref 23.5–31.3)
INR: 1
KETONES UR QL: 22 MMOL/L (ref 22–31)
KETONES UR QL: 92 MMOL/L (ref 100–111)
MAGNESIUM: 2.3 MG/DL (ref 1.6–3)
PHOSPHOROUS: 5.2 MG/DL (ref 2.5–4.6)
POTASSIUM: 5.6 MMOL/L (ref 3.5–5.1)
PROTEIN TOTAL SERUM: 6.9 G/DL (ref 6–8.3)
PROTHROMBIN TIME (PATIENT): 10.9 SECONDS (ref 10–11.7)
SODIUM: 128 MMOL/L (ref 135–145)
U HYALINE CASTS AUWI: (no result) /[LPF]
URBCS1 AUWI: (no result) /[HPF] (ref 0–2)
URINE APPEARANCE: (no result)
URINE BACTERIA AUWI: (no result)
URINE BILIRUBIN: (no result)
URINE BLOOD: (no result)
URINE COLOR: YELLOW
URINE GLUCOSE: (no result) MG/DL
URINE KETONE: (no result)
URINE LEUKOCYTE ESTERASE: (no result)
URINE NITRATE: (no result)
URINE PH: 8 (ref 5–8)
URINE PROTEIN: (no result)
URINE SOURCE: (no result)
URINE SPECIFIC GRAVITY: 1.01 (ref 1–1.03)
URINE SQUAMOUS EPITHELIAL CELL: (no result) /[HPF]
URINE UROBILINOGEN: 0.2 MG/DL
UWBCS1 AUWI: (no result) (ref 0–5)

## 2018-01-29 ENCOUNTER — INPATIENT HOSPITAL (OUTPATIENT)
Dept: URBAN - METROPOLITAN AREA HOSPITAL 107 | Facility: HOSPITAL | Age: 43
End: 2018-01-29
Payer: COMMERCIAL

## 2018-01-29 DIAGNOSIS — R13.10 DYSPHAGIA, UNSPECIFIED: ICD-10-CM

## 2018-01-29 DIAGNOSIS — R11.2 NAUSEA WITH VOMITING, UNSPECIFIED: ICD-10-CM

## 2018-01-29 DIAGNOSIS — K59.00 CONSTIPATION, UNSPECIFIED: ICD-10-CM

## 2018-01-29 PROCEDURE — 99222 1ST HOSP IP/OBS MODERATE 55: CPT | Performed by: INTERNAL MEDICINE

## 2018-01-30 ENCOUNTER — INPATIENT HOSPITAL (OUTPATIENT)
Dept: URBAN - METROPOLITAN AREA HOSPITAL 107 | Facility: HOSPITAL | Age: 43
End: 2018-01-30

## 2018-01-30 DIAGNOSIS — R11.2 NAUSEA WITH VOMITING, UNSPECIFIED: ICD-10-CM

## 2018-01-30 DIAGNOSIS — R13.10 DYSPHAGIA, UNSPECIFIED: ICD-10-CM

## 2018-01-30 PROCEDURE — 99232 SBSQ HOSP IP/OBS MODERATE 35: CPT | Performed by: PHYSICIAN ASSISTANT

## 2018-01-31 ENCOUNTER — INPATIENT HOSPITAL (OUTPATIENT)
Dept: URBAN - METROPOLITAN AREA HOSPITAL 107 | Facility: HOSPITAL | Age: 43
End: 2018-01-31
Payer: COMMERCIAL

## 2018-01-31 DIAGNOSIS — R11.2 NAUSEA WITH VOMITING, UNSPECIFIED: ICD-10-CM

## 2018-01-31 DIAGNOSIS — K29.50 UNSPECIFIED CHRONIC GASTRITIS WITHOUT BLEEDING: ICD-10-CM

## 2018-01-31 DIAGNOSIS — R13.10 DYSPHAGIA, UNSPECIFIED: ICD-10-CM

## 2018-01-31 PROCEDURE — 43450 DILATE ESOPHAGUS 1/MULT PASS: CPT

## 2018-01-31 PROCEDURE — 43239 EGD BIOPSY SINGLE/MULTIPLE: CPT

## 2018-02-01 ENCOUNTER — INPATIENT HOSPITAL (OUTPATIENT)
Dept: URBAN - METROPOLITAN AREA HOSPITAL 107 | Facility: HOSPITAL | Age: 43
End: 2018-02-01

## 2018-02-01 DIAGNOSIS — R11.2 NAUSEA WITH VOMITING, UNSPECIFIED: ICD-10-CM

## 2018-02-01 DIAGNOSIS — K20.8 OTHER ESOPHAGITIS: ICD-10-CM

## 2018-02-01 DIAGNOSIS — R13.10 DYSPHAGIA, UNSPECIFIED: ICD-10-CM

## 2018-02-01 DIAGNOSIS — K29.70 GASTRITIS, UNSPECIFIED, WITHOUT BLEEDING: ICD-10-CM

## 2018-02-01 PROCEDURE — 99231 SBSQ HOSP IP/OBS SF/LOW 25: CPT | Performed by: PHYSICIAN ASSISTANT

## 2018-05-05 ENCOUNTER — INPATIENT HOSPITAL (OUTPATIENT)
Dept: URBAN - METROPOLITAN AREA HOSPITAL 107 | Facility: HOSPITAL | Age: 43
End: 2018-05-05

## 2018-05-05 DIAGNOSIS — R13.10 DYSPHAGIA, UNSPECIFIED: ICD-10-CM

## 2018-05-05 DIAGNOSIS — Z80.0 FAMILY HISTORY OF MALIGNANT NEOPLASM OF DIGESTIVE ORGANS: ICD-10-CM

## 2018-05-05 DIAGNOSIS — R11.2 NAUSEA WITH VOMITING, UNSPECIFIED: ICD-10-CM

## 2018-05-05 DIAGNOSIS — D64.9 ANEMIA, UNSPECIFIED: ICD-10-CM

## 2018-05-05 PROCEDURE — 99222 1ST HOSP IP/OBS MODERATE 55: CPT | Performed by: INTERNAL MEDICINE

## 2018-05-06 ENCOUNTER — INPATIENT HOSPITAL (OUTPATIENT)
Dept: URBAN - METROPOLITAN AREA HOSPITAL 107 | Facility: HOSPITAL | Age: 43
End: 2018-05-06

## 2018-05-06 DIAGNOSIS — R11.2 NAUSEA WITH VOMITING, UNSPECIFIED: ICD-10-CM

## 2018-05-06 DIAGNOSIS — Z80.0 FAMILY HISTORY OF MALIGNANT NEOPLASM OF DIGESTIVE ORGANS: ICD-10-CM

## 2018-05-06 DIAGNOSIS — D64.9 ANEMIA, UNSPECIFIED: ICD-10-CM

## 2018-05-06 DIAGNOSIS — R13.10 DYSPHAGIA, UNSPECIFIED: ICD-10-CM

## 2018-05-06 PROCEDURE — 99232 SBSQ HOSP IP/OBS MODERATE 35: CPT

## 2018-05-07 ENCOUNTER — INPATIENT HOSPITAL (OUTPATIENT)
Dept: URBAN - METROPOLITAN AREA HOSPITAL 107 | Facility: HOSPITAL | Age: 43
End: 2018-05-07
Payer: COMMERCIAL

## 2018-05-07 DIAGNOSIS — D64.9 ANEMIA, UNSPECIFIED: ICD-10-CM

## 2018-05-07 DIAGNOSIS — R11.2 NAUSEA WITH VOMITING, UNSPECIFIED: ICD-10-CM

## 2018-05-07 DIAGNOSIS — K63.5 POLYP OF COLON: ICD-10-CM

## 2018-05-07 DIAGNOSIS — R13.10 DYSPHAGIA, UNSPECIFIED: ICD-10-CM

## 2018-05-07 DIAGNOSIS — K29.50 UNSPECIFIED CHRONIC GASTRITIS WITHOUT BLEEDING: ICD-10-CM

## 2018-05-07 DIAGNOSIS — K64.8 OTHER HEMORRHOIDS: ICD-10-CM

## 2018-05-07 PROCEDURE — 45380 COLONOSCOPY AND BIOPSY: CPT | Performed by: INTERNAL MEDICINE

## 2018-05-07 PROCEDURE — 43239 EGD BIOPSY SINGLE/MULTIPLE: CPT | Performed by: INTERNAL MEDICINE

## 2018-05-08 ENCOUNTER — INPATIENT HOSPITAL (OUTPATIENT)
Dept: URBAN - METROPOLITAN AREA HOSPITAL 107 | Facility: HOSPITAL | Age: 43
End: 2018-05-08

## 2018-05-08 DIAGNOSIS — K64.8 OTHER HEMORRHOIDS: ICD-10-CM

## 2018-05-08 DIAGNOSIS — D64.9 ANEMIA, UNSPECIFIED: ICD-10-CM

## 2018-05-08 DIAGNOSIS — R13.10 DYSPHAGIA, UNSPECIFIED: ICD-10-CM

## 2018-05-08 DIAGNOSIS — K63.5 POLYP OF COLON: ICD-10-CM

## 2018-05-08 DIAGNOSIS — R11.2 NAUSEA WITH VOMITING, UNSPECIFIED: ICD-10-CM

## 2018-05-08 DIAGNOSIS — K29.50 UNSPECIFIED CHRONIC GASTRITIS WITHOUT BLEEDING: ICD-10-CM

## 2018-05-08 PROCEDURE — 99231 SBSQ HOSP IP/OBS SF/LOW 25: CPT | Performed by: PHYSICIAN ASSISTANT

## 2018-05-09 PROCEDURE — 99232 SBSQ HOSP IP/OBS MODERATE 35: CPT | Performed by: PHYSICIAN ASSISTANT

## 2018-05-12 ENCOUNTER — INPATIENT HOSPITAL (OUTPATIENT)
Dept: URBAN - METROPOLITAN AREA HOSPITAL 107 | Facility: HOSPITAL | Age: 43
End: 2018-05-12

## 2018-05-12 DIAGNOSIS — R10.9 UNSPECIFIED ABDOMINAL PAIN: ICD-10-CM

## 2018-05-12 DIAGNOSIS — K21.9 GASTRO-ESOPHAGEAL REFLUX DISEASE WITHOUT ESOPHAGITIS: ICD-10-CM

## 2018-05-12 DIAGNOSIS — D64.9 ANEMIA, UNSPECIFIED: ICD-10-CM

## 2018-05-12 DIAGNOSIS — R11.2 NAUSEA WITH VOMITING, UNSPECIFIED: ICD-10-CM

## 2018-05-12 PROCEDURE — 99232 SBSQ HOSP IP/OBS MODERATE 35: CPT | Performed by: INTERNAL MEDICINE

## 2018-05-13 PROCEDURE — 99232 SBSQ HOSP IP/OBS MODERATE 35: CPT | Performed by: INTERNAL MEDICINE

## 2018-06-05 ENCOUNTER — INPATIENT HOSPITAL (OUTPATIENT)
Dept: URBAN - METROPOLITAN AREA HOSPITAL 76 | Facility: HOSPITAL | Age: 43
End: 2018-06-05

## 2018-06-05 DIAGNOSIS — R19.7 DIARRHEA, UNSPECIFIED: ICD-10-CM

## 2018-06-05 DIAGNOSIS — K62.5 HEMORRHAGE OF ANUS AND RECTUM: ICD-10-CM

## 2018-06-05 DIAGNOSIS — R10.32 LEFT LOWER QUADRANT PAIN: ICD-10-CM

## 2018-06-05 DIAGNOSIS — R11.2 NAUSEA WITH VOMITING, UNSPECIFIED: ICD-10-CM

## 2018-06-05 DIAGNOSIS — D64.89 OTHER SPECIFIED ANEMIAS: ICD-10-CM

## 2018-06-05 DIAGNOSIS — R07.89 OTHER CHEST PAIN: ICD-10-CM

## 2018-06-05 PROCEDURE — 99222 1ST HOSP IP/OBS MODERATE 55: CPT | Performed by: NURSE PRACTITIONER

## 2018-06-06 PROCEDURE — 99231 SBSQ HOSP IP/OBS SF/LOW 25: CPT | Performed by: NURSE PRACTITIONER

## 2018-06-07 ENCOUNTER — INPATIENT HOSPITAL (OUTPATIENT)
Dept: URBAN - METROPOLITAN AREA HOSPITAL 76 | Facility: HOSPITAL | Age: 43
End: 2018-06-07

## 2018-06-07 DIAGNOSIS — K63.5 POLYP OF COLON: ICD-10-CM

## 2018-06-07 DIAGNOSIS — K92.0 HEMATEMESIS: ICD-10-CM

## 2018-06-07 DIAGNOSIS — K62.5 HEMORRHAGE OF ANUS AND RECTUM: ICD-10-CM

## 2018-06-07 DIAGNOSIS — K64.1 SECOND DEGREE HEMORRHOIDS: ICD-10-CM

## 2018-06-07 DIAGNOSIS — R19.7 DIARRHEA, UNSPECIFIED: ICD-10-CM

## 2018-06-07 PROCEDURE — 45380 COLONOSCOPY AND BIOPSY: CPT | Performed by: INTERNAL MEDICINE

## 2018-06-07 PROCEDURE — 43235 EGD DIAGNOSTIC BRUSH WASH: CPT | Performed by: INTERNAL MEDICINE

## 2018-08-26 ENCOUNTER — INPATIENT HOSPITAL (OUTPATIENT)
Dept: URBAN - METROPOLITAN AREA HOSPITAL 107 | Facility: HOSPITAL | Age: 43
End: 2018-08-26

## 2018-08-26 DIAGNOSIS — R10.84 GENERALIZED ABDOMINAL PAIN: ICD-10-CM

## 2018-08-26 DIAGNOSIS — R11.2 NAUSEA WITH VOMITING, UNSPECIFIED: ICD-10-CM

## 2018-08-26 DIAGNOSIS — R13.10 DYSPHAGIA, UNSPECIFIED: ICD-10-CM

## 2018-08-26 DIAGNOSIS — N18.6 END STAGE RENAL DISEASE: ICD-10-CM

## 2018-08-26 PROCEDURE — 99223 1ST HOSP IP/OBS HIGH 75: CPT | Performed by: INTERNAL MEDICINE

## 2018-08-27 ENCOUNTER — INPATIENT HOSPITAL (OUTPATIENT)
Dept: URBAN - METROPOLITAN AREA HOSPITAL 107 | Facility: HOSPITAL | Age: 43
End: 2018-08-27

## 2018-08-27 DIAGNOSIS — K29.70 GASTRITIS, UNSPECIFIED, WITHOUT BLEEDING: ICD-10-CM

## 2018-08-27 DIAGNOSIS — K29.80 DUODENITIS WITHOUT BLEEDING: ICD-10-CM

## 2018-08-27 DIAGNOSIS — R13.10 DYSPHAGIA, UNSPECIFIED: ICD-10-CM

## 2018-08-27 DIAGNOSIS — R11.2 NAUSEA WITH VOMITING, UNSPECIFIED: ICD-10-CM

## 2018-08-27 PROCEDURE — 43239 EGD BIOPSY SINGLE/MULTIPLE: CPT | Performed by: INTERNAL MEDICINE

## 2018-08-28 ENCOUNTER — INPATIENT HOSPITAL (OUTPATIENT)
Dept: URBAN - METROPOLITAN AREA HOSPITAL 107 | Facility: HOSPITAL | Age: 43
End: 2018-08-28

## 2018-08-28 DIAGNOSIS — R11.0 NAUSEA: ICD-10-CM

## 2018-08-28 DIAGNOSIS — R10.84 GENERALIZED ABDOMINAL PAIN: ICD-10-CM

## 2018-08-28 DIAGNOSIS — R13.10 DYSPHAGIA, UNSPECIFIED: ICD-10-CM

## 2018-08-28 PROCEDURE — 99231 SBSQ HOSP IP/OBS SF/LOW 25: CPT | Performed by: PHYSICIAN ASSISTANT

## 2018-09-18 ENCOUNTER — INPATIENT HOSPITAL (OUTPATIENT)
Dept: URBAN - METROPOLITAN AREA HOSPITAL 107 | Facility: HOSPITAL | Age: 43
End: 2018-09-18

## 2018-09-18 DIAGNOSIS — K59.00 CONSTIPATION, UNSPECIFIED: ICD-10-CM

## 2018-09-18 DIAGNOSIS — R10.9 UNSPECIFIED ABDOMINAL PAIN: ICD-10-CM

## 2018-09-18 DIAGNOSIS — R93.3 ABNORMAL FINDINGS ON DIAGNOSTIC IMAGING OF OTHER PARTS OF DI: ICD-10-CM

## 2018-09-18 DIAGNOSIS — R07.9 CHEST PAIN, UNSPECIFIED: ICD-10-CM

## 2018-09-18 PROCEDURE — 99223 1ST HOSP IP/OBS HIGH 75: CPT | Performed by: INTERNAL MEDICINE

## 2018-09-19 ENCOUNTER — INPATIENT HOSPITAL (OUTPATIENT)
Dept: URBAN - METROPOLITAN AREA HOSPITAL 107 | Facility: HOSPITAL | Age: 43
End: 2018-09-19

## 2018-09-19 DIAGNOSIS — R13.10 DYSPHAGIA, UNSPECIFIED: ICD-10-CM

## 2018-09-19 DIAGNOSIS — K59.00 CONSTIPATION, UNSPECIFIED: ICD-10-CM

## 2018-09-19 PROCEDURE — 99232 SBSQ HOSP IP/OBS MODERATE 35: CPT | Performed by: PHYSICIAN ASSISTANT

## 2018-09-20 PROCEDURE — 99231 SBSQ HOSP IP/OBS SF/LOW 25: CPT | Performed by: PHYSICIAN ASSISTANT

## 2018-09-21 ENCOUNTER — INPATIENT HOSPITAL (OUTPATIENT)
Dept: URBAN - METROPOLITAN AREA HOSPITAL 107 | Facility: HOSPITAL | Age: 43
End: 2018-09-21

## 2018-09-21 DIAGNOSIS — K29.70 GASTRITIS, UNSPECIFIED, WITHOUT BLEEDING: ICD-10-CM

## 2018-09-21 DIAGNOSIS — R13.10 DYSPHAGIA, UNSPECIFIED: ICD-10-CM

## 2018-09-21 DIAGNOSIS — K31.84 GASTROPARESIS: ICD-10-CM

## 2018-09-21 DIAGNOSIS — K22.4 DYSKINESIA OF ESOPHAGUS: ICD-10-CM

## 2018-09-21 PROCEDURE — 43235 EGD DIAGNOSTIC BRUSH WASH: CPT

## 2018-09-22 ENCOUNTER — INPATIENT HOSPITAL (OUTPATIENT)
Dept: URBAN - METROPOLITAN AREA HOSPITAL 107 | Facility: HOSPITAL | Age: 43
End: 2018-09-22

## 2018-09-22 DIAGNOSIS — K59.00 CONSTIPATION, UNSPECIFIED: ICD-10-CM

## 2018-09-22 DIAGNOSIS — R13.10 DYSPHAGIA, UNSPECIFIED: ICD-10-CM

## 2018-09-22 DIAGNOSIS — K31.84 GASTROPARESIS: ICD-10-CM

## 2018-09-22 PROCEDURE — 99232 SBSQ HOSP IP/OBS MODERATE 35: CPT | Performed by: INTERNAL MEDICINE

## 2018-09-24 ENCOUNTER — INPATIENT HOSPITAL (OUTPATIENT)
Dept: URBAN - METROPOLITAN AREA HOSPITAL 107 | Facility: HOSPITAL | Age: 43
End: 2018-09-24

## 2018-09-24 DIAGNOSIS — K59.00 CONSTIPATION, UNSPECIFIED: ICD-10-CM

## 2018-09-24 DIAGNOSIS — K31.84 GASTROPARESIS: ICD-10-CM

## 2018-09-24 DIAGNOSIS — R10.9 UNSPECIFIED ABDOMINAL PAIN: ICD-10-CM

## 2018-09-24 DIAGNOSIS — R13.10 DYSPHAGIA, UNSPECIFIED: ICD-10-CM

## 2018-09-24 PROCEDURE — 99232 SBSQ HOSP IP/OBS MODERATE 35: CPT | Performed by: PHYSICIAN ASSISTANT

## 2018-09-25 PROCEDURE — 99232 SBSQ HOSP IP/OBS MODERATE 35: CPT | Performed by: PHYSICIAN ASSISTANT

## 2018-09-26 PROCEDURE — 99232 SBSQ HOSP IP/OBS MODERATE 35: CPT | Performed by: PHYSICIAN ASSISTANT

## 2018-09-27 PROCEDURE — 99231 SBSQ HOSP IP/OBS SF/LOW 25: CPT | Performed by: PHYSICIAN ASSISTANT

## 2018-10-07 ENCOUNTER — INPATIENT HOSPITAL (OUTPATIENT)
Dept: URBAN - METROPOLITAN AREA HOSPITAL 133 | Facility: HOSPITAL | Age: 43
End: 2018-10-07

## 2018-10-07 DIAGNOSIS — R10.13 EPIGASTRIC PAIN: ICD-10-CM

## 2018-10-07 DIAGNOSIS — R11.2 NAUSEA WITH VOMITING, UNSPECIFIED: ICD-10-CM

## 2018-10-07 DIAGNOSIS — K31.84 GASTROPARESIS: ICD-10-CM

## 2018-10-07 PROCEDURE — 99222 1ST HOSP IP/OBS MODERATE 55: CPT

## 2018-10-20 ENCOUNTER — INPATIENT HOSPITAL (OUTPATIENT)
Dept: URBAN - METROPOLITAN AREA HOSPITAL 90 | Facility: HOSPITAL | Age: 43
End: 2018-10-20
Payer: COMMERCIAL

## 2018-10-20 DIAGNOSIS — N18.6 END STAGE RENAL DISEASE: ICD-10-CM

## 2018-10-20 DIAGNOSIS — R10.13 EPIGASTRIC PAIN: ICD-10-CM

## 2018-10-20 DIAGNOSIS — R11.2 NAUSEA WITH VOMITING, UNSPECIFIED: ICD-10-CM

## 2018-10-20 DIAGNOSIS — K31.84 GASTROPARESIS: ICD-10-CM

## 2018-10-20 PROCEDURE — 99232 SBSQ HOSP IP/OBS MODERATE 35: CPT

## 2018-11-20 ENCOUNTER — INPATIENT HOSPITAL (OUTPATIENT)
Dept: URBAN - METROPOLITAN AREA HOSPITAL 107 | Facility: HOSPITAL | Age: 43
End: 2018-11-20

## 2018-11-20 DIAGNOSIS — R13.10 DYSPHAGIA, UNSPECIFIED: ICD-10-CM

## 2018-11-20 DIAGNOSIS — R10.9 UNSPECIFIED ABDOMINAL PAIN: ICD-10-CM

## 2018-11-20 DIAGNOSIS — R19.7 DIARRHEA, UNSPECIFIED: ICD-10-CM

## 2018-11-20 DIAGNOSIS — K30 FUNCTIONAL DYSPEPSIA: ICD-10-CM

## 2018-11-20 DIAGNOSIS — R11.2 NAUSEA WITH VOMITING, UNSPECIFIED: ICD-10-CM

## 2018-11-20 DIAGNOSIS — K59.00 CONSTIPATION, UNSPECIFIED: ICD-10-CM

## 2018-11-20 PROCEDURE — 99223 1ST HOSP IP/OBS HIGH 75: CPT | Performed by: INTERNAL MEDICINE

## 2019-02-05 ENCOUNTER — INPATIENT HOSPITAL (OUTPATIENT)
Dept: URBAN - METROPOLITAN AREA HOSPITAL 107 | Facility: HOSPITAL | Age: 44
End: 2019-02-05

## 2019-02-05 DIAGNOSIS — K92.0 HEMATEMESIS: ICD-10-CM

## 2019-02-05 DIAGNOSIS — R10.13 EPIGASTRIC PAIN: ICD-10-CM

## 2019-02-05 DIAGNOSIS — R11.2 NAUSEA WITH VOMITING, UNSPECIFIED: ICD-10-CM

## 2019-02-05 DIAGNOSIS — D64.9 ANEMIA, UNSPECIFIED: ICD-10-CM

## 2019-02-05 PROCEDURE — 99223 1ST HOSP IP/OBS HIGH 75: CPT | Performed by: INTERNAL MEDICINE

## 2019-02-06 ENCOUNTER — INPATIENT HOSPITAL (OUTPATIENT)
Dept: URBAN - METROPOLITAN AREA HOSPITAL 107 | Facility: HOSPITAL | Age: 44
End: 2019-02-06

## 2019-02-06 DIAGNOSIS — K92.0 HEMATEMESIS: ICD-10-CM

## 2019-02-06 DIAGNOSIS — K21.9 GASTRO-ESOPHAGEAL REFLUX DISEASE WITHOUT ESOPHAGITIS: ICD-10-CM

## 2019-02-06 DIAGNOSIS — R12 HEARTBURN: ICD-10-CM

## 2019-02-06 PROCEDURE — 43235 EGD DIAGNOSTIC BRUSH WASH: CPT | Performed by: INTERNAL MEDICINE

## 2019-02-25 ENCOUNTER — INPATIENT HOSPITAL (OUTPATIENT)
Dept: URBAN - METROPOLITAN AREA HOSPITAL 107 | Facility: HOSPITAL | Age: 44
End: 2019-02-25

## 2019-02-25 DIAGNOSIS — R10.9 UNSPECIFIED ABDOMINAL PAIN: ICD-10-CM

## 2019-02-25 DIAGNOSIS — R63.4 ABNORMAL WEIGHT LOSS: ICD-10-CM

## 2019-02-25 DIAGNOSIS — R74.8 ABNORMAL LEVELS OF OTHER SERUM ENZYMES: ICD-10-CM

## 2019-02-25 DIAGNOSIS — R13.10 DYSPHAGIA, UNSPECIFIED: ICD-10-CM

## 2019-02-25 DIAGNOSIS — D50.8 OTHER IRON DEFICIENCY ANEMIAS: ICD-10-CM

## 2019-02-25 PROCEDURE — 99223 1ST HOSP IP/OBS HIGH 75: CPT

## 2019-02-26 ENCOUNTER — INPATIENT HOSPITAL (OUTPATIENT)
Dept: URBAN - METROPOLITAN AREA HOSPITAL 107 | Facility: HOSPITAL | Age: 44
End: 2019-02-26

## 2019-02-26 DIAGNOSIS — D64.9 ANEMIA, UNSPECIFIED: ICD-10-CM

## 2019-02-26 DIAGNOSIS — R10.13 EPIGASTRIC PAIN: ICD-10-CM

## 2019-02-26 DIAGNOSIS — R10.33 PERIUMBILICAL PAIN: ICD-10-CM

## 2019-02-26 DIAGNOSIS — R13.10 DYSPHAGIA, UNSPECIFIED: ICD-10-CM

## 2019-02-26 PROCEDURE — 99232 SBSQ HOSP IP/OBS MODERATE 35: CPT | Performed by: PHYSICIAN ASSISTANT

## 2019-04-09 ENCOUNTER — INPATIENT HOSPITAL (OUTPATIENT)
Dept: URBAN - METROPOLITAN AREA HOSPITAL 107 | Facility: HOSPITAL | Age: 44
End: 2019-04-09

## 2019-04-09 DIAGNOSIS — R06.02 SHORTNESS OF BREATH: ICD-10-CM

## 2019-04-09 DIAGNOSIS — D64.89 OTHER SPECIFIED ANEMIAS: ICD-10-CM

## 2019-04-09 PROCEDURE — 99223 1ST HOSP IP/OBS HIGH 75: CPT | Performed by: INTERNAL MEDICINE

## 2019-05-02 ENCOUNTER — INPATIENT HOSPITAL (OUTPATIENT)
Dept: URBAN - METROPOLITAN AREA HOSPITAL 107 | Facility: HOSPITAL | Age: 44
End: 2019-05-02
Payer: COMMERCIAL

## 2019-05-02 DIAGNOSIS — K92.1 MELENA: ICD-10-CM

## 2019-05-02 PROCEDURE — 45378 DIAGNOSTIC COLONOSCOPY: CPT | Mod: 52

## 2019-05-03 ENCOUNTER — INPATIENT HOSPITAL (OUTPATIENT)
Dept: URBAN - METROPOLITAN AREA HOSPITAL 107 | Facility: HOSPITAL | Age: 44
End: 2019-05-03
Payer: COMMERCIAL

## 2019-05-03 DIAGNOSIS — R10.9 UNSPECIFIED ABDOMINAL PAIN: ICD-10-CM

## 2019-05-03 DIAGNOSIS — K59.00 CONSTIPATION, UNSPECIFIED: ICD-10-CM

## 2019-05-03 DIAGNOSIS — R11.2 NAUSEA WITH VOMITING, UNSPECIFIED: ICD-10-CM

## 2019-05-03 DIAGNOSIS — K62.5 HEMORRHAGE OF ANUS AND RECTUM: ICD-10-CM

## 2019-05-03 DIAGNOSIS — D64.9 ANEMIA, UNSPECIFIED: ICD-10-CM

## 2019-05-03 PROCEDURE — 99223 1ST HOSP IP/OBS HIGH 75: CPT | Performed by: INTERNAL MEDICINE

## 2019-05-04 ENCOUNTER — INPATIENT HOSPITAL (OUTPATIENT)
Dept: URBAN - METROPOLITAN AREA HOSPITAL 107 | Facility: HOSPITAL | Age: 44
End: 2019-05-04

## 2019-05-04 DIAGNOSIS — K92.1 MELENA: ICD-10-CM

## 2019-05-04 DIAGNOSIS — K62.5 HEMORRHAGE OF ANUS AND RECTUM: ICD-10-CM

## 2019-05-04 DIAGNOSIS — D50.0 IRON DEFICIENCY ANEMIA SECONDARY TO BLOOD LOSS (CHRONIC): ICD-10-CM

## 2019-05-04 DIAGNOSIS — K59.00 CONSTIPATION, UNSPECIFIED: ICD-10-CM

## 2019-05-04 DIAGNOSIS — R11.2 NAUSEA WITH VOMITING, UNSPECIFIED: ICD-10-CM

## 2019-05-04 DIAGNOSIS — D64.9 ANEMIA, UNSPECIFIED: ICD-10-CM

## 2019-05-04 DIAGNOSIS — R10.9 UNSPECIFIED ABDOMINAL PAIN: ICD-10-CM

## 2019-05-04 PROCEDURE — 99232 SBSQ HOSP IP/OBS MODERATE 35: CPT | Performed by: INTERNAL MEDICINE

## 2019-05-06 ENCOUNTER — INPATIENT HOSPITAL (OUTPATIENT)
Dept: URBAN - METROPOLITAN AREA HOSPITAL 107 | Facility: HOSPITAL | Age: 44
End: 2019-05-06

## 2019-05-06 DIAGNOSIS — Z53.9 PROCEDURE AND TREATMENT NOT CARRIED OUT, UNSPECIFIED REASON: ICD-10-CM

## 2019-05-06 DIAGNOSIS — K64.4 RESIDUAL HEMORRHOIDAL SKIN TAGS: ICD-10-CM

## 2019-05-06 PROCEDURE — 45330 DIAGNOSTIC SIGMOIDOSCOPY: CPT | Performed by: INTERNAL MEDICINE

## 2019-05-08 ENCOUNTER — INPATIENT HOSPITAL (OUTPATIENT)
Dept: URBAN - METROPOLITAN AREA HOSPITAL 107 | Facility: HOSPITAL | Age: 44
End: 2019-05-08

## 2019-05-08 DIAGNOSIS — R10.9 UNSPECIFIED ABDOMINAL PAIN: ICD-10-CM

## 2019-05-08 DIAGNOSIS — K59.00 CONSTIPATION, UNSPECIFIED: ICD-10-CM

## 2019-05-08 DIAGNOSIS — D50.0 IRON DEFICIENCY ANEMIA SECONDARY TO BLOOD LOSS (CHRONIC): ICD-10-CM

## 2019-05-08 DIAGNOSIS — K62.5 HEMORRHAGE OF ANUS AND RECTUM: ICD-10-CM

## 2019-05-08 DIAGNOSIS — R11.2 NAUSEA WITH VOMITING, UNSPECIFIED: ICD-10-CM

## 2019-05-08 PROCEDURE — 99231 SBSQ HOSP IP/OBS SF/LOW 25: CPT | Performed by: PHYSICIAN ASSISTANT

## 2019-05-21 ENCOUNTER — INPATIENT HOSPITAL (OUTPATIENT)
Dept: URBAN - METROPOLITAN AREA HOSPITAL 107 | Facility: HOSPITAL | Age: 44
End: 2019-05-21

## 2019-05-21 DIAGNOSIS — D63.1 ANEMIA IN CHRONIC KIDNEY DISEASE: ICD-10-CM

## 2019-05-21 DIAGNOSIS — N18.6 END STAGE RENAL DISEASE: ICD-10-CM

## 2019-05-21 DIAGNOSIS — I50.21 ACUTE SYSTOLIC (CONGESTIVE) HEART FAILURE: ICD-10-CM

## 2019-05-21 DIAGNOSIS — K52.9 NONINFECTIVE GASTROENTERITIS AND COLITIS, UNSPECIFIED: ICD-10-CM

## 2019-05-21 PROCEDURE — 99223 1ST HOSP IP/OBS HIGH 75: CPT | Performed by: INTERNAL MEDICINE

## 2019-05-22 ENCOUNTER — INPATIENT HOSPITAL (OUTPATIENT)
Dept: URBAN - METROPOLITAN AREA HOSPITAL 107 | Facility: HOSPITAL | Age: 44
End: 2019-05-22

## 2019-05-22 DIAGNOSIS — R63.0 ANOREXIA: ICD-10-CM

## 2019-05-22 DIAGNOSIS — R19.7 DIARRHEA, UNSPECIFIED: ICD-10-CM

## 2019-05-22 DIAGNOSIS — R10.11 RIGHT UPPER QUADRANT PAIN: ICD-10-CM

## 2019-05-22 DIAGNOSIS — K92.0 HEMATEMESIS: ICD-10-CM

## 2019-05-22 DIAGNOSIS — R12 HEARTBURN: ICD-10-CM

## 2019-05-22 PROCEDURE — 99232 SBSQ HOSP IP/OBS MODERATE 35: CPT | Performed by: PHYSICIAN ASSISTANT

## 2019-05-23 PROCEDURE — 99232 SBSQ HOSP IP/OBS MODERATE 35: CPT | Performed by: PHYSICIAN ASSISTANT

## 2019-05-24 ENCOUNTER — INPATIENT HOSPITAL (OUTPATIENT)
Dept: URBAN - METROPOLITAN AREA HOSPITAL 107 | Facility: HOSPITAL | Age: 44
End: 2019-05-24

## 2019-05-24 DIAGNOSIS — K21.9 GASTRO-ESOPHAGEAL REFLUX DISEASE WITHOUT ESOPHAGITIS: ICD-10-CM

## 2019-05-24 DIAGNOSIS — D50.0 IRON DEFICIENCY ANEMIA SECONDARY TO BLOOD LOSS (CHRONIC): ICD-10-CM

## 2019-05-24 PROCEDURE — 44360 SMALL BOWEL ENDOSCOPY: CPT

## 2019-05-28 ENCOUNTER — INPATIENT HOSPITAL (OUTPATIENT)
Dept: URBAN - METROPOLITAN AREA HOSPITAL 107 | Facility: HOSPITAL | Age: 44
End: 2019-05-28

## 2019-05-28 DIAGNOSIS — K52.9 NONINFECTIVE GASTROENTERITIS AND COLITIS, UNSPECIFIED: ICD-10-CM

## 2019-05-28 DIAGNOSIS — R13.19 OTHER DYSPHAGIA: ICD-10-CM

## 2019-05-28 DIAGNOSIS — D64.9 ANEMIA, UNSPECIFIED: ICD-10-CM

## 2019-05-28 DIAGNOSIS — K92.0 HEMATEMESIS: ICD-10-CM

## 2019-05-28 DIAGNOSIS — R10.9 UNSPECIFIED ABDOMINAL PAIN: ICD-10-CM

## 2019-05-28 DIAGNOSIS — R93.3 ABNORMAL FINDINGS ON DIAGNOSTIC IMAGING OF OTHER PARTS OF DI: ICD-10-CM

## 2019-05-28 DIAGNOSIS — R13.10 DYSPHAGIA, UNSPECIFIED: ICD-10-CM

## 2019-05-28 PROCEDURE — 99232 SBSQ HOSP IP/OBS MODERATE 35: CPT | Performed by: PHYSICIAN ASSISTANT

## 2019-05-30 PROCEDURE — 99232 SBSQ HOSP IP/OBS MODERATE 35: CPT | Performed by: PHYSICIAN ASSISTANT

## 2019-05-31 PROCEDURE — 99231 SBSQ HOSP IP/OBS SF/LOW 25: CPT | Performed by: PHYSICIAN ASSISTANT

## 2019-06-22 ENCOUNTER — INPATIENT HOSPITAL (OUTPATIENT)
Dept: URBAN - METROPOLITAN AREA HOSPITAL 107 | Facility: HOSPITAL | Age: 44
End: 2019-06-22
Payer: COMMERCIAL

## 2019-06-22 DIAGNOSIS — I10 ESSENTIAL (PRIMARY) HYPERTENSION: ICD-10-CM

## 2019-06-22 DIAGNOSIS — K59.00 CONSTIPATION, UNSPECIFIED: ICD-10-CM

## 2019-06-22 DIAGNOSIS — R10.9 UNSPECIFIED ABDOMINAL PAIN: ICD-10-CM

## 2019-06-22 DIAGNOSIS — R11.2 NAUSEA WITH VOMITING, UNSPECIFIED: ICD-10-CM

## 2019-06-22 DIAGNOSIS — D69.3 IMMUNE THROMBOCYTOPENIC PURPURA: ICD-10-CM

## 2019-06-22 DIAGNOSIS — R13.10 DYSPHAGIA, UNSPECIFIED: ICD-10-CM

## 2019-06-22 DIAGNOSIS — N18.6 END STAGE RENAL DISEASE: ICD-10-CM

## 2019-06-22 DIAGNOSIS — E11.9 TYPE 2 DIABETES MELLITUS WITHOUT COMPLICATIONS: ICD-10-CM

## 2019-06-22 PROCEDURE — 99223 1ST HOSP IP/OBS HIGH 75: CPT | Performed by: INTERNAL MEDICINE

## 2019-06-23 ENCOUNTER — INPATIENT HOSPITAL (OUTPATIENT)
Dept: URBAN - METROPOLITAN AREA HOSPITAL 107 | Facility: HOSPITAL | Age: 44
End: 2019-06-23

## 2019-06-23 DIAGNOSIS — E11.9 TYPE 2 DIABETES MELLITUS WITHOUT COMPLICATIONS: ICD-10-CM

## 2019-06-23 DIAGNOSIS — N18.6 END STAGE RENAL DISEASE: ICD-10-CM

## 2019-06-23 DIAGNOSIS — R93.5 ABNORMAL FINDINGS ON DIAGNOSTIC IMAGING OF OTHER ABDOMINAL R: ICD-10-CM

## 2019-06-23 DIAGNOSIS — I10 ESSENTIAL (PRIMARY) HYPERTENSION: ICD-10-CM

## 2019-06-23 DIAGNOSIS — R10.9 UNSPECIFIED ABDOMINAL PAIN: ICD-10-CM

## 2019-06-23 DIAGNOSIS — K59.00 CONSTIPATION, UNSPECIFIED: ICD-10-CM

## 2019-06-23 DIAGNOSIS — R13.10 DYSPHAGIA, UNSPECIFIED: ICD-10-CM

## 2019-06-23 DIAGNOSIS — M31.1 THROMBOTIC MICROANGIOPATHY: ICD-10-CM

## 2019-06-23 DIAGNOSIS — R11.2 NAUSEA WITH VOMITING, UNSPECIFIED: ICD-10-CM

## 2019-06-23 PROCEDURE — 99232 SBSQ HOSP IP/OBS MODERATE 35: CPT | Performed by: INTERNAL MEDICINE

## 2019-06-25 ENCOUNTER — INPATIENT HOSPITAL (OUTPATIENT)
Dept: URBAN - METROPOLITAN AREA HOSPITAL 107 | Facility: HOSPITAL | Age: 44
End: 2019-06-25
Payer: COMMERCIAL

## 2019-06-25 DIAGNOSIS — K59.00 CONSTIPATION, UNSPECIFIED: ICD-10-CM

## 2019-06-25 DIAGNOSIS — R11.2 NAUSEA WITH VOMITING, UNSPECIFIED: ICD-10-CM

## 2019-06-25 DIAGNOSIS — R10.84 GENERALIZED ABDOMINAL PAIN: ICD-10-CM

## 2019-06-25 DIAGNOSIS — R13.10 DYSPHAGIA, UNSPECIFIED: ICD-10-CM

## 2019-06-25 PROCEDURE — 99231 SBSQ HOSP IP/OBS SF/LOW 25: CPT | Performed by: PHYSICIAN ASSISTANT

## 2020-07-13 ENCOUNTER — INPATIENT HOSPITAL (OUTPATIENT)
Dept: URBAN - METROPOLITAN AREA HOSPITAL 107 | Facility: HOSPITAL | Age: 45
End: 2020-07-13

## 2020-07-13 DIAGNOSIS — K92.1 MELENA: ICD-10-CM

## 2020-07-13 DIAGNOSIS — D64.9 ANEMIA, UNSPECIFIED: ICD-10-CM

## 2020-07-13 DIAGNOSIS — R93.3 ABNORMAL FINDINGS ON DIAGNOSTIC IMAGING OF OTHER PARTS OF DI: ICD-10-CM

## 2020-07-13 DIAGNOSIS — I10 ESSENTIAL (PRIMARY) HYPERTENSION: ICD-10-CM

## 2020-07-13 PROCEDURE — 99222 1ST HOSP IP/OBS MODERATE 55: CPT | Performed by: INTERNAL MEDICINE

## 2020-07-14 ENCOUNTER — INPATIENT HOSPITAL (OUTPATIENT)
Dept: URBAN - METROPOLITAN AREA HOSPITAL 107 | Facility: HOSPITAL | Age: 45
End: 2020-07-14

## 2020-07-14 DIAGNOSIS — D50.0 IRON DEFICIENCY ANEMIA SECONDARY TO BLOOD LOSS (CHRONIC): ICD-10-CM

## 2020-07-14 DIAGNOSIS — K92.1 MELENA: ICD-10-CM

## 2020-07-14 PROCEDURE — 43235 EGD DIAGNOSTIC BRUSH WASH: CPT

## 2020-09-06 ENCOUNTER — APPOINTMENT (OUTPATIENT)
Dept: GENERAL RADIOLOGY | Facility: HOSPITAL | Age: 45
End: 2020-09-06

## 2020-09-06 ENCOUNTER — APPOINTMENT (OUTPATIENT)
Dept: CT IMAGING | Facility: HOSPITAL | Age: 45
End: 2020-09-06

## 2020-09-06 ENCOUNTER — HOSPITAL ENCOUNTER (INPATIENT)
Facility: HOSPITAL | Age: 45
LOS: 7 days | Discharge: HOME | End: 2020-09-13
Attending: EMERGENCY MEDICINE | Admitting: INTERNAL MEDICINE

## 2020-09-06 DIAGNOSIS — N18.6 CHRONIC KIDNEY DISEASE WITH END STAGE RENAL FAILURE ON DIALYSIS (HCC): ICD-10-CM

## 2020-09-06 DIAGNOSIS — I16.1 HYPERTENSIVE EMERGENCY: ICD-10-CM

## 2020-09-06 DIAGNOSIS — Z99.2 CHRONIC KIDNEY DISEASE WITH END STAGE RENAL FAILURE ON DIALYSIS (HCC): ICD-10-CM

## 2020-09-06 DIAGNOSIS — R56.9 SEIZURE (HCC): Primary | ICD-10-CM

## 2020-09-06 LAB
ALBUMIN SERPL-MCNC: 3.9 G/DL (ref 3.5–5.2)
ALBUMIN/GLOB SERPL: 1.2 G/DL
ALP SERPL-CCNC: 323 U/L (ref 39–117)
ALT SERPL W P-5'-P-CCNC: <5 U/L (ref 1–33)
ANION GAP SERPL CALCULATED.3IONS-SCNC: 17 MMOL/L (ref 5–15)
APAP SERPL-MCNC: <5 MCG/ML (ref 10–30)
APTT PPP: 26.5 SECONDS (ref 24–31)
AST SERPL-CCNC: 10 U/L (ref 1–32)
BASOPHILS # BLD AUTO: 0 10*3/MM3 (ref 0–0.2)
BASOPHILS NFR BLD AUTO: 0.5 % (ref 0–1.5)
BILIRUB SERPL-MCNC: 0.5 MG/DL (ref 0–1.2)
BILIRUB UR QL STRIP: NEGATIVE
BUN SERPL-MCNC: 43 MG/DL (ref 6–20)
BUN SERPL-MCNC: ABNORMAL MG/DL
BUN/CREAT SERPL: ABNORMAL
CALCIUM SPEC-SCNC: 8.8 MG/DL (ref 8.6–10.5)
CHLORIDE SERPL-SCNC: 101 MMOL/L (ref 98–107)
CK SERPL-CCNC: 52 U/L (ref 20–180)
CLARITY UR: CLEAR
CO2 SERPL-SCNC: 25 MMOL/L (ref 22–29)
COLOR UR: YELLOW
CREAT SERPL-MCNC: 7.27 MG/DL (ref 0.57–1)
DEPRECATED RDW RBC AUTO: 62.6 FL (ref 37–54)
EOSINOPHIL # BLD AUTO: 0 10*3/MM3 (ref 0–0.4)
EOSINOPHIL NFR BLD AUTO: 0.3 % (ref 0.3–6.2)
ERYTHROCYTE [DISTWIDTH] IN BLOOD BY AUTOMATED COUNT: 18.9 % (ref 12.3–15.4)
GFR SERPL CREATININE-BSD FRML MDRD: 6 ML/MIN/1.73
GFR SERPL CREATININE-BSD FRML MDRD: ABNORMAL ML/MIN/{1.73_M2}
GLOBULIN UR ELPH-MCNC: 3.3 GM/DL
GLUCOSE SERPL-MCNC: 100 MG/DL (ref 65–99)
GLUCOSE UR STRIP-MCNC: NEGATIVE MG/DL
HCT VFR BLD AUTO: 32.6 % (ref 34–46.6)
HGB BLD-MCNC: 10.6 G/DL (ref 12–15.9)
HGB UR QL STRIP.AUTO: ABNORMAL
HOLD SPECIMEN: NORMAL
INR PPP: 1.01 (ref 0.93–1.1)
KETONES UR QL STRIP: NEGATIVE
LEUKOCYTE ESTERASE UR QL STRIP.AUTO: ABNORMAL
LYMPHOCYTES # BLD AUTO: 0.6 10*3/MM3 (ref 0.7–3.1)
LYMPHOCYTES NFR BLD AUTO: 6.9 % (ref 19.6–45.3)
MCH RBC QN AUTO: 30.1 PG (ref 26.6–33)
MCHC RBC AUTO-ENTMCNC: 32.4 G/DL (ref 31.5–35.7)
MCV RBC AUTO: 93.1 FL (ref 79–97)
MONOCYTES # BLD AUTO: 0.3 10*3/MM3 (ref 0.1–0.9)
MONOCYTES NFR BLD AUTO: 3.4 % (ref 5–12)
NEUTROPHILS NFR BLD AUTO: 7.1 10*3/MM3 (ref 1.7–7)
NEUTROPHILS NFR BLD AUTO: 88.9 % (ref 42.7–76)
NITRITE UR QL STRIP: NEGATIVE
NRBC BLD AUTO-RTO: 0 /100 WBC (ref 0–0.2)
PH UR STRIP.AUTO: 8.5 [PH] (ref 5–8)
PLATELET # BLD AUTO: 147 10*3/MM3 (ref 140–450)
PMV BLD AUTO: 8.6 FL (ref 6–12)
POTASSIUM SERPL-SCNC: 5.7 MMOL/L (ref 3.5–5.2)
PROT SERPL-MCNC: 7.2 G/DL (ref 6–8.5)
PROT UR QL STRIP: ABNORMAL
PROTHROMBIN TIME: 10.9 SECONDS (ref 9.6–11.7)
RBC # BLD AUTO: 3.5 10*6/MM3 (ref 3.77–5.28)
SALICYLATES SERPL-MCNC: <0.3 MG/DL
SARS-COV-2 RNA PNL SPEC NAA+PROBE: NOT DETECTED
SODIUM SERPL-SCNC: 143 MMOL/L (ref 136–145)
SP GR UR STRIP: 1.01 (ref 1–1.03)
TROPONIN T SERPL-MCNC: 0.03 NG/ML (ref 0–0.03)
UROBILINOGEN UR QL STRIP: ABNORMAL
WBC # BLD AUTO: 8 10*3/MM3 (ref 3.4–10.8)

## 2020-09-06 PROCEDURE — P9612 CATHETERIZE FOR URINE SPEC: HCPCS

## 2020-09-06 PROCEDURE — 87635 SARS-COV-2 COVID-19 AMP PRB: CPT | Performed by: EMERGENCY MEDICINE

## 2020-09-06 PROCEDURE — 25010000002 HEPARIN (PORCINE) PER 1000 UNITS: Performed by: NURSE PRACTITIONER

## 2020-09-06 PROCEDURE — 80307 DRUG TEST PRSMV CHEM ANLYZR: CPT | Performed by: NURSE PRACTITIONER

## 2020-09-06 PROCEDURE — 71045 X-RAY EXAM CHEST 1 VIEW: CPT

## 2020-09-06 PROCEDURE — 82550 ASSAY OF CK (CPK): CPT | Performed by: NURSE PRACTITIONER

## 2020-09-06 PROCEDURE — 85730 THROMBOPLASTIN TIME PARTIAL: CPT | Performed by: EMERGENCY MEDICINE

## 2020-09-06 PROCEDURE — 81003 URINALYSIS AUTO W/O SCOPE: CPT | Performed by: EMERGENCY MEDICINE

## 2020-09-06 PROCEDURE — 87641 MR-STAPH DNA AMP PROBE: CPT | Performed by: NURSE PRACTITIONER

## 2020-09-06 PROCEDURE — 70450 CT HEAD/BRAIN W/O DYE: CPT

## 2020-09-06 PROCEDURE — 85025 COMPLETE CBC W/AUTO DIFF WBC: CPT | Performed by: EMERGENCY MEDICINE

## 2020-09-06 PROCEDURE — 93005 ELECTROCARDIOGRAM TRACING: CPT | Performed by: EMERGENCY MEDICINE

## 2020-09-06 PROCEDURE — 99285 EMERGENCY DEPT VISIT HI MDM: CPT

## 2020-09-06 PROCEDURE — 25010000002 LORAZEPAM PER 2 MG: Performed by: EMERGENCY MEDICINE

## 2020-09-06 PROCEDURE — 87040 BLOOD CULTURE FOR BACTERIA: CPT | Performed by: NURSE PRACTITIONER

## 2020-09-06 PROCEDURE — 80053 COMPREHEN METABOLIC PANEL: CPT | Performed by: EMERGENCY MEDICINE

## 2020-09-06 PROCEDURE — 84520 ASSAY OF UREA NITROGEN: CPT | Performed by: EMERGENCY MEDICINE

## 2020-09-06 PROCEDURE — 84484 ASSAY OF TROPONIN QUANT: CPT | Performed by: NURSE PRACTITIONER

## 2020-09-06 PROCEDURE — 25010000003 LEVETIRACETAM IN NACL 0.75% 1000 MG/100ML SOLUTION: Performed by: EMERGENCY MEDICINE

## 2020-09-06 PROCEDURE — 36415 COLL VENOUS BLD VENIPUNCTURE: CPT

## 2020-09-06 PROCEDURE — 85610 PROTHROMBIN TIME: CPT | Performed by: EMERGENCY MEDICINE

## 2020-09-06 PROCEDURE — 82607 VITAMIN B-12: CPT | Performed by: NURSE PRACTITIONER

## 2020-09-06 RX ORDER — ONDANSETRON 4 MG/1
4 TABLET, FILM COATED ORAL EVERY 6 HOURS PRN
Status: DISCONTINUED | OUTPATIENT
Start: 2020-09-06 | End: 2020-09-13 | Stop reason: HOSPADM

## 2020-09-06 RX ORDER — LORAZEPAM 2 MG/ML
1 INJECTION INTRAMUSCULAR ONCE
Status: COMPLETED | OUTPATIENT
Start: 2020-09-06 | End: 2020-09-06

## 2020-09-06 RX ORDER — CLINDAMYCIN PHOSPHATE 600 MG/50ML
600 INJECTION, SOLUTION INTRAVENOUS EVERY 8 HOURS
Status: DISCONTINUED | OUTPATIENT
Start: 2020-09-06 | End: 2020-09-07

## 2020-09-06 RX ORDER — NICOTINE POLACRILEX 4 MG
15 LOZENGE BUCCAL
Status: DISCONTINUED | OUTPATIENT
Start: 2020-09-06 | End: 2020-09-13 | Stop reason: HOSPADM

## 2020-09-06 RX ORDER — SODIUM CHLORIDE 0.9 % (FLUSH) 0.9 %
10 SYRINGE (ML) INJECTION AS NEEDED
Status: DISCONTINUED | OUTPATIENT
Start: 2020-09-06 | End: 2020-09-13 | Stop reason: HOSPADM

## 2020-09-06 RX ORDER — HEPARIN SODIUM 5000 [USP'U]/ML
5000 INJECTION, SOLUTION INTRAVENOUS; SUBCUTANEOUS EVERY 8 HOURS SCHEDULED
Status: DISCONTINUED | OUTPATIENT
Start: 2020-09-06 | End: 2020-09-13 | Stop reason: HOSPADM

## 2020-09-06 RX ORDER — PANTOPRAZOLE SODIUM 40 MG/10ML
40 INJECTION, POWDER, LYOPHILIZED, FOR SOLUTION INTRAVENOUS
Status: DISCONTINUED | OUTPATIENT
Start: 2020-09-07 | End: 2020-09-07

## 2020-09-06 RX ORDER — SODIUM CHLORIDE 0.9 % (FLUSH) 0.9 %
10 SYRINGE (ML) INJECTION EVERY 12 HOURS SCHEDULED
Status: DISCONTINUED | OUTPATIENT
Start: 2020-09-06 | End: 2020-09-13 | Stop reason: HOSPADM

## 2020-09-06 RX ORDER — ACETAMINOPHEN 650 MG/1
650 SUPPOSITORY RECTAL EVERY 6 HOURS PRN
Status: DISCONTINUED | OUTPATIENT
Start: 2020-09-06 | End: 2020-09-13 | Stop reason: HOSPADM

## 2020-09-06 RX ORDER — LEVETIRACETAM 5 MG/ML
500 INJECTION INTRAVASCULAR EVERY 12 HOURS SCHEDULED
Status: DISCONTINUED | OUTPATIENT
Start: 2020-09-07 | End: 2020-09-13 | Stop reason: HOSPADM

## 2020-09-06 RX ORDER — LEVETIRACETAM 10 MG/ML
1000 INJECTION INTRAVASCULAR ONCE
Status: COMPLETED | OUTPATIENT
Start: 2020-09-06 | End: 2020-09-06

## 2020-09-06 RX ORDER — ONDANSETRON 2 MG/ML
4 INJECTION INTRAMUSCULAR; INTRAVENOUS EVERY 6 HOURS PRN
Status: DISCONTINUED | OUTPATIENT
Start: 2020-09-06 | End: 2020-09-13 | Stop reason: HOSPADM

## 2020-09-06 RX ORDER — DEXTROSE MONOHYDRATE 25 G/50ML
25 INJECTION, SOLUTION INTRAVENOUS
Status: DISCONTINUED | OUTPATIENT
Start: 2020-09-06 | End: 2020-09-13 | Stop reason: HOSPADM

## 2020-09-06 RX ADMIN — LEVETIRACETAM 1000 MG: 10 INJECTION INTRAVENOUS at 20:20

## 2020-09-06 RX ADMIN — Medication 10 ML: at 23:01

## 2020-09-06 RX ADMIN — LORAZEPAM 1 MG: 2 INJECTION INTRAMUSCULAR; INTRAVENOUS at 18:35

## 2020-09-06 RX ADMIN — LORAZEPAM 1 MG: 2 INJECTION INTRAMUSCULAR; INTRAVENOUS at 19:11

## 2020-09-06 RX ADMIN — HEPARIN SODIUM 5000 UNITS: 5000 INJECTION INTRAVENOUS; SUBCUTANEOUS at 23:05

## 2020-09-06 NOTE — ED PROVIDER NOTES
Subjective   History of Present Illness  45-year-old female with a history of renal failure on dialysis and hypertension apparently was found on the floor by her roommate shaking and having what she thought was seizure activity.  EMS was called and the patient had some atypical type of jerking.  When I saw the patient in the emergency department she was having unusual jerks of her arms and legs and would occasionally open her eyes to command but she did not demonstrate classic generalized tonic-clonic seizure activity.  I reviewed her old chart and there is no mention of seizure disorder and no anticonvulsant medication listed.  The patient does have chronic renal failure on dialysis with a shunt in her right arm.  Review of Systems   Unable to perform ROS: Patient nonverbal       Past Medical History:   Diagnosis Date   • Acid reflux    • Anxiety and depression    • Chronic pain    • Hypertension    • Renal failure        Allergies   Allergen Reactions   • Codeine    • Contrast Dye      IV CONTRAST   • Golytely [Peg 3350-Electrolytes] Unknown - Low Severity   • Morphine And Related    • Sulfa Antibiotics    • Toradol [Ketorolac Tromethamine]        Past Surgical History:   Procedure Laterality Date   • APPENDECTOMY     • BRAIN SURGERY      Fistula placed   • CHOLECYSTECTOMY     • COLONOSCOPY     • HYSTERECTOMY         Family History   Problem Relation Age of Onset   • Kidney disease Mother        Social History     Socioeconomic History   • Marital status:      Spouse name: Not on file   • Number of children: Not on file   • Years of education: Not on file   • Highest education level: Not on file   Tobacco Use   • Smoking status: Current Every Day Smoker     Packs/day: 0.50   Substance and Sexual Activity   • Alcohol use: No   • Drug use: No           Objective   Physical Exam  The patient is lying on a stretcher she has an elevated blood pressure initially it was at 257/129.  Her heart rate was 104.  The  patient was having some atypical jerks of her arm and squeezing her eyes shut.  She would not open her eyes on command and she resisted when I tried to forcibly open them.  I saw no evidence of any trauma to the tongue.  The chest is clear cardiovascular exam reveals a regular rhythm her abdomen is soft the patient has no cyanosis clubbing or edema she has tone in all extremities and is not hyperreflexic.  The patient had an old surgical scar from craniotomy on the right parietal scalp  Procedures     The patient was prepped in a sterile fashion.   The patient had the right side of the subclavian area cleansed with antiseptic.  The skin was infiltrated with 4 cc of 1% Xylocaine without epinephrine.  The patient had a right subclavian line begun without difficulty.  The patient had good blood return and the position was confirmed on x-ray to be in the superior vena cava.      ED Course                 Results for orders placed or performed during the hospital encounter of 09/06/20   Comprehensive Metabolic Panel   Result Value Ref Range    Glucose 100 (H) 65 - 99 mg/dL    BUN      Creatinine 7.27 (H) 0.57 - 1.00 mg/dL    Sodium 143 136 - 145 mmol/L    Potassium 5.7 (H) 3.5 - 5.2 mmol/L    Chloride 101 98 - 107 mmol/L    CO2 25.0 22.0 - 29.0 mmol/L    Calcium 8.8 8.6 - 10.5 mg/dL    Total Protein 7.2 6.0 - 8.5 g/dL    Albumin 3.90 3.50 - 5.20 g/dL    ALT (SGPT) <5 1 - 33 U/L    AST (SGOT) 10 1 - 32 U/L    Alkaline Phosphatase 323 (H) 39 - 117 U/L    Total Bilirubin 0.5 0.0 - 1.2 mg/dL    eGFR Non African Amer 6 (L) >60 mL/min/1.73    eGFR  African Amer      Globulin 3.3 gm/dL    A/G Ratio 1.2 g/dL    BUN/Creatinine Ratio      Anion Gap 17.0 (H) 5.0 - 15.0 mmol/L   Protime-INR   Result Value Ref Range    Protime 10.9 9.6 - 11.7 Seconds    INR 1.01 0.93 - 1.10   aPTT   Result Value Ref Range    PTT 26.5 24.0 - 31.0 seconds   CBC Auto Differential   Result Value Ref Range    WBC 8.00 3.40 - 10.80 10*3/mm3    RBC 3.50 (L)  3.77 - 5.28 10*6/mm3    Hemoglobin 10.6 (L) 12.0 - 15.9 g/dL    Hematocrit 32.6 (L) 34.0 - 46.6 %    MCV 93.1 79.0 - 97.0 fL    MCH 30.1 26.6 - 33.0 pg    MCHC 32.4 31.5 - 35.7 g/dL    RDW 18.9 (H) 12.3 - 15.4 %    RDW-SD 62.6 (H) 37.0 - 54.0 fl    MPV 8.6 6.0 - 12.0 fL    Platelets 147 140 - 450 10*3/mm3    Neutrophil % 88.9 (H) 42.7 - 76.0 %    Lymphocyte % 6.9 (L) 19.6 - 45.3 %    Monocyte % 3.4 (L) 5.0 - 12.0 %    Eosinophil % 0.3 0.3 - 6.2 %    Basophil % 0.5 0.0 - 1.5 %    Neutrophils, Absolute 7.10 (H) 1.70 - 7.00 10*3/mm3    Lymphocytes, Absolute 0.60 (L) 0.70 - 3.10 10*3/mm3    Monocytes, Absolute 0.30 0.10 - 0.90 10*3/mm3    Eosinophils, Absolute 0.00 0.00 - 0.40 10*3/mm3    Basophils, Absolute 0.00 0.00 - 0.20 10*3/mm3    nRBC 0.0 0.0 - 0.2 /100 WBC   BUN   Result Value Ref Range    BUN 43 (H) 6 - 20 mg/dL   Gold Top - SST   Result Value Ref Range    Extra Tube Hold for add-ons.      Medications   sodium chloride 0.9 % flush 10 mL (has no administration in time range)   niCARdipine (CARDENE) 25 mg in 250 mL NS (0.1 mg/mL) infusion (VTB) (5 mg/hr Intravenous Currently Infusing 9/6/20 1959)   LORazepam (ATIVAN) injection 1 mg (1 mg Intravenous Given 9/6/20 1835)   LORazepam (ATIVAN) injection 1 mg (1 mg Intravenous Given 9/6/20 1911)   levETIRAcetam in NaCl 0.75% (KEPPRA) IVPB 1,000 mg (0 mg Intravenous Stopped 9/6/20 2035)     Ct Head Without Contrast    Result Date: 9/6/2020  1. No acute intracranial abnormality. 2. Changes of prior right frontal craniotomy. Electronically signed by:  Yohannes Guerrero M.D.  9/6/2020 6:29 PM                                 MDM  The patient CT scan shows evidence of an old right craniotomy.  I do not see any acute intracranial abnormality.  The patient's blood pressure was significantly elevated at 230/120 and she was started on Cardene and this was brought under control.  The patient did have a right subclavian line started as there was minimal peripheral venous access.   The patient had no further seizure activity but she also was not verbal.  I assume that this is part of her postictal state.  The patient was also given Ativan as well as Keppra.  The patient will be admitted to the ICU.  Time spent in critical care was 55 minutes  Final diagnoses:   Seizure (CMS/[facility])   Hypertensive emergency   Chronic kidney disease with end stage renal failure on dialysis (CMS/[facility])            Tee Cotter MD  09/06/20 [id number]       Tee Cotter MD  09/06/20 [id number]

## 2020-09-07 ENCOUNTER — APPOINTMENT (OUTPATIENT)
Dept: GENERAL RADIOLOGY | Facility: HOSPITAL | Age: 45
End: 2020-09-07

## 2020-09-07 LAB
ANION GAP SERPL CALCULATED.3IONS-SCNC: 15 MMOL/L (ref 5–15)
ANISOCYTOSIS BLD QL: ABNORMAL
BUN SERPL-MCNC: 47 MG/DL (ref 6–20)
BUN SERPL-MCNC: ABNORMAL MG/DL
BUN/CREAT SERPL: ABNORMAL
CALCIUM SPEC-SCNC: 8.6 MG/DL (ref 8.6–10.5)
CHLORIDE SERPL-SCNC: 102 MMOL/L (ref 98–107)
CO2 SERPL-SCNC: 25 MMOL/L (ref 22–29)
CREAT SERPL-MCNC: 7.78 MG/DL (ref 0.57–1)
DEPRECATED RDW RBC AUTO: 60.8 FL (ref 37–54)
EOSINOPHIL # BLD MANUAL: 0.06 10*3/MM3 (ref 0–0.4)
EOSINOPHIL NFR BLD MANUAL: 1 % (ref 0.3–6.2)
ERYTHROCYTE [DISTWIDTH] IN BLOOD BY AUTOMATED COUNT: 18.5 % (ref 12.3–15.4)
GFR SERPL CREATININE-BSD FRML MDRD: 6 ML/MIN/1.73
GFR SERPL CREATININE-BSD FRML MDRD: ABNORMAL ML/MIN/{1.73_M2}
GLUCOSE BLDC GLUCOMTR-MCNC: 72 MG/DL (ref 70–105)
GLUCOSE BLDC GLUCOMTR-MCNC: 76 MG/DL (ref 70–105)
GLUCOSE BLDC GLUCOMTR-MCNC: 85 MG/DL (ref 70–105)
GLUCOSE BLDC GLUCOMTR-MCNC: 95 MG/DL (ref 70–105)
GLUCOSE SERPL-MCNC: 90 MG/DL (ref 65–99)
HBA1C MFR BLD: 5.3 % (ref 3.5–5.6)
HCT VFR BLD AUTO: 30.3 % (ref 34–46.6)
HGB BLD-MCNC: 9.9 G/DL (ref 12–15.9)
LYMPHOCYTES # BLD MANUAL: 0.75 10*3/MM3 (ref 0.7–3.1)
LYMPHOCYTES NFR BLD MANUAL: 13 % (ref 19.6–45.3)
LYMPHOCYTES NFR BLD MANUAL: 5 % (ref 5–12)
MAGNESIUM SERPL-MCNC: 2.5 MG/DL (ref 1.6–2.6)
MCH RBC QN AUTO: 30.2 PG (ref 26.6–33)
MCHC RBC AUTO-ENTMCNC: 32.5 G/DL (ref 31.5–35.7)
MCV RBC AUTO: 92.9 FL (ref 79–97)
MONOCYTES # BLD AUTO: 0.29 10*3/MM3 (ref 0.1–0.9)
MRSA DNA SPEC QL NAA+PROBE: NORMAL
NEUTROPHILS # BLD AUTO: 4.7 10*3/MM3 (ref 1.7–7)
NEUTROPHILS NFR BLD MANUAL: 81 % (ref 42.7–76)
PHOSPHATE SERPL-MCNC: 4.7 MG/DL (ref 2.5–4.5)
PLAT MORPH BLD: NORMAL
PLATELET # BLD AUTO: 134 10*3/MM3 (ref 140–450)
PMV BLD AUTO: 8.6 FL (ref 6–12)
POTASSIUM SERPL-SCNC: 4.8 MMOL/L (ref 3.5–5.2)
RBC # BLD AUTO: 3.26 10*6/MM3 (ref 3.77–5.28)
SCAN SLIDE: NORMAL
SODIUM SERPL-SCNC: 142 MMOL/L (ref 136–145)
TROPONIN T SERPL-MCNC: 0.04 NG/ML (ref 0–0.03)
TSH SERPL DL<=0.05 MIU/L-ACNC: 0.56 UIU/ML (ref 0.27–4.2)
VIT B12 BLD-MCNC: 830 PG/ML (ref 211–946)
WBC # BLD AUTO: 5.8 10*3/MM3 (ref 3.4–10.8)
WBC MORPH BLD: NORMAL

## 2020-09-07 PROCEDURE — 83036 HEMOGLOBIN GLYCOSYLATED A1C: CPT | Performed by: NURSE PRACTITIONER

## 2020-09-07 PROCEDURE — 25010000002 LEVETIRACETAM IN NACL 0.82% 500 MG/100ML SOLUTION: Performed by: NURSE PRACTITIONER

## 2020-09-07 PROCEDURE — 84484 ASSAY OF TROPONIN QUANT: CPT | Performed by: NURSE PRACTITIONER

## 2020-09-07 PROCEDURE — 83735 ASSAY OF MAGNESIUM: CPT | Performed by: NURSE PRACTITIONER

## 2020-09-07 PROCEDURE — 87040 BLOOD CULTURE FOR BACTERIA: CPT | Performed by: NURSE PRACTITIONER

## 2020-09-07 PROCEDURE — 85007 BL SMEAR W/DIFF WBC COUNT: CPT | Performed by: NURSE PRACTITIONER

## 2020-09-07 PROCEDURE — 71045 X-RAY EXAM CHEST 1 VIEW: CPT

## 2020-09-07 PROCEDURE — 25010000002 CEFEPIME PER 500 MG: Performed by: NURSE PRACTITIONER

## 2020-09-07 PROCEDURE — 84100 ASSAY OF PHOSPHORUS: CPT | Performed by: NURSE PRACTITIONER

## 2020-09-07 PROCEDURE — 84443 ASSAY THYROID STIM HORMONE: CPT | Performed by: NURSE PRACTITIONER

## 2020-09-07 PROCEDURE — 25010000002 HEPARIN (PORCINE) PER 1000 UNITS: Performed by: NURSE PRACTITIONER

## 2020-09-07 PROCEDURE — 02HV33Z INSERTION OF INFUSION DEVICE INTO SUPERIOR VENA CAVA, PERCUTANEOUS APPROACH: ICD-10-PCS | Performed by: INTERNAL MEDICINE

## 2020-09-07 PROCEDURE — 82962 GLUCOSE BLOOD TEST: CPT

## 2020-09-07 PROCEDURE — 85025 COMPLETE CBC W/AUTO DIFF WBC: CPT | Performed by: NURSE PRACTITIONER

## 2020-09-07 PROCEDURE — 80048 BASIC METABOLIC PNL TOTAL CA: CPT | Performed by: NURSE PRACTITIONER

## 2020-09-07 PROCEDURE — 25010000003 AMPICILLIN-SULBACTAM PER 1.5 G: Performed by: INTERNAL MEDICINE

## 2020-09-07 PROCEDURE — 99222 1ST HOSP IP/OBS MODERATE 55: CPT | Performed by: NURSE PRACTITIONER

## 2020-09-07 RX ORDER — ROPINIROLE 0.25 MG/1
0.25 TABLET, FILM COATED ORAL NIGHTLY
Status: DISCONTINUED | OUTPATIENT
Start: 2020-09-07 | End: 2020-09-13 | Stop reason: HOSPADM

## 2020-09-07 RX ORDER — AMITRIPTYLINE HYDROCHLORIDE 25 MG/1
25 TABLET, FILM COATED ORAL NIGHTLY
Status: DISCONTINUED | OUTPATIENT
Start: 2020-09-07 | End: 2020-09-13 | Stop reason: HOSPADM

## 2020-09-07 RX ORDER — ROPINIROLE 0.25 MG/1
0.25 TABLET, FILM COATED ORAL NIGHTLY
COMMUNITY

## 2020-09-07 RX ORDER — ESCITALOPRAM OXALATE 10 MG/1
20 TABLET ORAL DAILY
Status: DISCONTINUED | OUTPATIENT
Start: 2020-09-07 | End: 2020-09-13 | Stop reason: HOSPADM

## 2020-09-07 RX ORDER — PANTOPRAZOLE SODIUM 20 MG/1
20 TABLET, DELAYED RELEASE ORAL 2 TIMES DAILY
Status: DISCONTINUED | OUTPATIENT
Start: 2020-09-07 | End: 2020-09-07

## 2020-09-07 RX ORDER — PANTOPRAZOLE SODIUM 20 MG/1
20 TABLET, DELAYED RELEASE ORAL 2 TIMES DAILY
COMMUNITY

## 2020-09-07 RX ORDER — HYDROCODONE BITARTRATE AND ACETAMINOPHEN 10; 325 MG/1; MG/1
1 TABLET ORAL 3 TIMES DAILY PRN
Status: DISCONTINUED | OUTPATIENT
Start: 2020-09-07 | End: 2020-09-13 | Stop reason: HOSPADM

## 2020-09-07 RX ORDER — LIDOCAINE HYDROCHLORIDE 10 MG/ML
INJECTION, SOLUTION EPIDURAL; INFILTRATION; INTRACAUDAL; PERINEURAL
Status: COMPLETED
Start: 2020-09-07 | End: 2020-09-07

## 2020-09-07 RX ORDER — LIDOCAINE HYDROCHLORIDE 10 MG/ML
2 INJECTION, SOLUTION EPIDURAL; INFILTRATION; INTRACAUDAL; PERINEURAL ONCE
Status: COMPLETED | OUTPATIENT
Start: 2020-09-07 | End: 2020-09-07

## 2020-09-07 RX ORDER — GABAPENTIN 100 MG/1
100 CAPSULE ORAL 3 TIMES DAILY
Status: DISCONTINUED | OUTPATIENT
Start: 2020-09-07 | End: 2020-09-09

## 2020-09-07 RX ORDER — CLONIDINE HYDROCHLORIDE 0.1 MG/1
0.2 TABLET ORAL EVERY 12 HOURS SCHEDULED
Status: DISCONTINUED | OUTPATIENT
Start: 2020-09-07 | End: 2020-09-08

## 2020-09-07 RX ORDER — LISINOPRIL 40 MG/1
40 TABLET ORAL NIGHTLY
COMMUNITY
End: 2020-09-13 | Stop reason: HOSPADM

## 2020-09-07 RX ORDER — PROMETHAZINE HYDROCHLORIDE 25 MG/1
25 TABLET ORAL EVERY 8 HOURS PRN
COMMUNITY
End: 2020-09-13 | Stop reason: HOSPADM

## 2020-09-07 RX ORDER — ESCITALOPRAM OXALATE 20 MG/1
20 TABLET ORAL DAILY
COMMUNITY

## 2020-09-07 RX ORDER — PANTOPRAZOLE SODIUM 40 MG/1
40 TABLET, DELAYED RELEASE ORAL
Status: DISCONTINUED | OUTPATIENT
Start: 2020-09-08 | End: 2020-09-13 | Stop reason: HOSPADM

## 2020-09-07 RX ORDER — GABAPENTIN 600 MG/1
600 TABLET ORAL 3 TIMES DAILY
COMMUNITY

## 2020-09-07 RX ORDER — AMITRIPTYLINE HYDROCHLORIDE 25 MG/1
25 TABLET, FILM COATED ORAL NIGHTLY
COMMUNITY

## 2020-09-07 RX ORDER — HYDROCODONE BITARTRATE AND ACETAMINOPHEN 10; 325 MG/1; MG/1
1 TABLET ORAL 3 TIMES DAILY
COMMUNITY
End: 2020-09-13 | Stop reason: HOSPADM

## 2020-09-07 RX ORDER — HYDROCODONE BITARTRATE AND ACETAMINOPHEN 10; 325 MG/1; MG/1
1 TABLET ORAL 3 TIMES DAILY
Status: DISCONTINUED | OUTPATIENT
Start: 2020-09-07 | End: 2020-09-07

## 2020-09-07 RX ORDER — CLONIDINE HYDROCHLORIDE 0.2 MG/1
0.2 TABLET ORAL 2 TIMES DAILY
COMMUNITY

## 2020-09-07 RX ADMIN — PANTOPRAZOLE SODIUM 40 MG: 40 INJECTION, POWDER, FOR SOLUTION INTRAVENOUS at 05:53

## 2020-09-07 RX ADMIN — HEPARIN SODIUM 5000 UNITS: 5000 INJECTION INTRAVENOUS; SUBCUTANEOUS at 05:53

## 2020-09-07 RX ADMIN — SODIUM CHLORIDE 2.5 MG/HR: 9 INJECTION, SOLUTION INTRAVENOUS at 07:54

## 2020-09-07 RX ADMIN — SODIUM CHLORIDE 2.5 MG/HR: 9 INJECTION, SOLUTION INTRAVENOUS at 19:16

## 2020-09-07 RX ADMIN — Medication 10 ML: at 08:08

## 2020-09-07 RX ADMIN — HEPARIN SODIUM 5000 UNITS: 5000 INJECTION INTRAVENOUS; SUBCUTANEOUS at 21:23

## 2020-09-07 RX ADMIN — SODIUM CHLORIDE 100 MG: 900 INJECTION, SOLUTION INTRAVENOUS at 08:12

## 2020-09-07 RX ADMIN — LIDOCAINE HYDROCHLORIDE 2 ML: 10 INJECTION, SOLUTION EPIDURAL; INFILTRATION; INTRACAUDAL; PERINEURAL at 10:58

## 2020-09-07 RX ADMIN — AMPICILLIN SODIUM AND SULBACTAM SODIUM 1.5 G: 1; .5 INJECTION, POWDER, FOR SOLUTION INTRAMUSCULAR; INTRAVENOUS at 10:22

## 2020-09-07 RX ADMIN — Medication 10 ML: at 21:23

## 2020-09-07 RX ADMIN — LEVETIRACETAM 500 MG: 5 INJECTION INTRAVENOUS at 08:02

## 2020-09-07 RX ADMIN — CLINDAMYCIN PHOSPHATE 600 MG: 600 INJECTION, SOLUTION INTRAVENOUS at 00:17

## 2020-09-07 RX ADMIN — SODIUM CHLORIDE 2.5 MG/HR: 9 INJECTION, SOLUTION INTRAVENOUS at 18:08

## 2020-09-07 RX ADMIN — SODIUM CHLORIDE 5 MG/HR: 9 INJECTION, SOLUTION INTRAVENOUS at 02:36

## 2020-09-07 RX ADMIN — SODIUM CHLORIDE 100 MG: 900 INJECTION, SOLUTION INTRAVENOUS at 21:24

## 2020-09-07 RX ADMIN — AMPICILLIN SODIUM AND SULBACTAM SODIUM 1.5 G: 1; .5 INJECTION, POWDER, FOR SOLUTION INTRAMUSCULAR; INTRAVENOUS at 21:24

## 2020-09-07 RX ADMIN — CLINDAMYCIN PHOSPHATE 600 MG: 600 INJECTION, SOLUTION INTRAVENOUS at 06:19

## 2020-09-07 RX ADMIN — CEFEPIME HYDROCHLORIDE 1 G: 1 INJECTION, POWDER, FOR SOLUTION INTRAMUSCULAR; INTRAVENOUS at 00:16

## 2020-09-07 RX ADMIN — HEPARIN SODIUM 5000 UNITS: 5000 INJECTION INTRAVENOUS; SUBCUTANEOUS at 13:13

## 2020-09-07 RX ADMIN — LEVETIRACETAM 500 MG: 5 INJECTION INTRAVENOUS at 21:24

## 2020-09-07 NOTE — PROGRESS NOTES
PULMONARY/ CRITICAL CARE PROGRESS NOTE        Patient Name:  Maura Garibay    :  1975    Medical Record:  [id number]    PRIMARY CARE PHYSICIAN     Danial Landeros MD [facility]  Maura Garibay is a 45 y.o. female with a PMH significant for ESRD on HD MWF, seizures, hypertension, GERD, hyperparathyroidism, IBS, anemia, hyperphosphatemia, HFpEF, depression, TTP, anxiety/depression and tobacco abuse who presented to the ER after being found by her neighbor shaking with seizure like activity.  EMS was called and when they arrived she was some jerking of her extremities.  Upon arrival to the ER she was evaluated and had seizure like activity and was given two doses of Ativan and a gram of Keppra.  Currently patient is unable to provide details regarding her medical history due to being obtunded.        :  No new issues.  No seizures since admitted.  Awakens with voice, but not oriented.    REVIEW OF SYSTEMS    Difficult to obtain    HOME MEDICATIONS  Amitriptyline (ELAVIL) 25 MG tablet qhs    Aspirin 81 MG qday    AURYXIA 1  MG(Fe) tablet two tablets with meals    calcitriol (ROCALTROL) 0.25 MCG capsule 1 capsule by mouth 3 (three) times a week     cinacalcet (SENSIPAR) 30 MG tablet  by mouth 3 (three) times a week    cloNIDine (CATAPRES) 0.2 mg by mouth 3 (three) times daily     dilTIAZem ER beads (TIAZAC) 240 MG 24 hr capsule QD    diphenhydrAMINE (BENADRYL) 50 MG   every 4 (four) hours as needed for Itching    docusate sodium (COLACE) 100 MG capsule QD    escitalopram (LEXAPRO) 10 MG tablet QD    esomeprazole (NEXIUM) 40 MG QD    gabapentin (NEURONTIN) 600 MG tablet mg 4 (four) times daily    hydrALAZINE (APRESOLINE) 100 MG tablet BID    lacosamide (VIMPAT) 100 mg BID    levETIRAcetam 500 mg BID    linaclotide (LINZESS) 290 MCG QD    metoclopramide 5 MG one tablet four times daily before meals and nightly    promethazine 25 mg by mouth every 6 hours as needed for nausea    Requip 0.25 MG tablet  nightly    TRAZODONE HCL PO 50 mg nightly    MEDICAL HISTORY    Past Medical History:   Diagnosis Date   • (HFpEF) heart failure with preserved ejection fraction (CMS/HCC)    • Acid reflux    • Anxiety and depression    • Cardiomyopathy (CMS/HCC)    • Chronic pain    • Dependence on renal dialysis (CMS/HCC)    • Hyperparathyroidism (CMS/HCC)    • Hypertension    • IBS (irritable bowel syndrome)    • Migraines    • Neuropathy    • Pulmonary emboli (CMS/HCC)    • Renal failure    • Seizures (CMS/HCC)    • TTP (thrombotic thrombocytopenic purpura) (CMS/HCC)         SURGICAL HISTORY    Past Surgical History:   Procedure Laterality Date   • APPENDECTOMY     • ARTERIOVENOUS FISTULA     • BRAIN SURGERY      Fistula placed   • BRAIN SURGERY      Cyst   • CHOLECYSTECTOMY     • COLONOSCOPY     • HYSTERECTOMY     • SALPINGO OOPHORECTOMY     • TONSILLECTOMY          FAMILY HISTORY    Family History   Problem Relation Age of Onset   • Kidney disease Mother    • Hypertension Mother    • Heart disease Father    • Heart disease Sister    • Heart disease Brother         SOCIAL HISTORY    Social History     Socioeconomic History   • Marital status:      Spouse name: Not on file   • Number of children: Not on file   • Years of education: Not on file   • Highest education level: Not on file   Tobacco Use   • Smoking status: Current Every Day Smoker     Packs/day: 0.50   Substance and Sexual Activity   • Alcohol use: No   • Drug use: No        ALLERGIES    Allergies   Allergen Reactions   • Butorphanol Hives   • Codeine    • Contrast Dye      IV CONTRAST   • Golytely [Peg 3350-Electrolytes] Unknown - Low Severity   • Haldol [Haloperidol] Hives   • Morphine And Related    • Sulfa Antibiotics    • Toradol [Ketorolac Tromethamine]    • Vancomycin Hives         PHYSICAL EXAM    tMax 24 hrs:  Temp (24hrs), Av.6 °F (37 °C), Min:98 °F (36.7 °C), Max:100.4 °F (38 °C)    Vitals Ranges:  Temp:  [98 °F (36.7 °C)-100.4 °F (38 °C)] 98.4  °F (36.9 °C)  Heart Rate:  [] 95  Resp:  [18-27] 18  BP: (114-257)/() 120/92  Intake and Output Last 3 Shifts:  I/O last 3 completed shifts:  In: 437 [I.V.:437]  Out: -     Constitutional:  NAD  Able to protect airway.    HEENT:   Atraumatic, PERRL, conjunctiva normal, moist oral mucosa, no nasal discharge.  Trachea is midline.  Respiratory:   No respiratory distress, normal breath sounds, no rales, no wheezing   Cardiovascular:  Tachy. Pulses 2+ and equal in all four extremities.   HARHS fistula with positive thrill and bruit.    GI:   Soft, nondistended, positive bowel sounds.  Extremities:   No edema, cyanosis or tenderness.  Integument:   No rashes.   Neurologic:  Awakens, but confused.  Moves all four extremities to painful stimuli.      LABS    Lab Results (last 24 hours)     Procedure Component Value Units Date/Time    CBC & Differential [[id number]] Collected:  09/07/20 0448    Specimen:  Blood Updated:  09/07/20 0555    Narrative:       The following orders were created for panel order CBC & Differential.  Procedure                               Abnormality         Status                     ---------                               -----------         ------                     CBC Auto Differential[[id number]]        Abnormal            Final result                 Please view results for these tests on the individual orders.    CBC Auto Differential [[id number]]  (Abnormal) Collected:  09/07/20 0448    Specimen:  Blood Updated:  09/07/20 0555     WBC 5.80 10*3/mm3      RBC 3.26 10*6/mm3      Hemoglobin 9.9 g/dL      Hematocrit 30.3 %      MCV 92.9 fL      MCH 30.2 pg      MCHC 32.5 g/dL      RDW 18.5 %      RDW-SD 60.8 fl      MPV 8.6 fL      Platelets 134 10*3/mm3     Scan Slide [[id number]] Collected:  09/07/20 0448    Specimen:  Blood Updated:  09/07/20 0555     Scan Slide --     Comment: See Manual Differential Results       Manual Differential [[id number]]  (Abnormal) Collected:  09/07/20 0448     Specimen:  Blood Updated:  09/07/20 0555     Neutrophil % 81.0 %      Lymphocyte % 13.0 %      Monocyte % 5.0 %      Eosinophil % 1.0 %      Neutrophils Absolute 4.70 10*3/mm3      Lymphocytes Absolute 0.75 10*3/mm3      Monocytes Absolute 0.29 10*3/mm3      Eosinophils Absolute 0.06 10*3/mm3      Anisocytosis Slight/1+     WBC Morphology Normal     Platelet Morphology Normal    BUN [[id number]]  (Abnormal) Collected:  09/07/20 0448    Specimen:  Blood Updated:  09/07/20 0551     BUN 47 mg/dL     Troponin [[id number]]  (Abnormal) Collected:  09/07/20 0448    Specimen:  Blood Updated:  09/07/20 0541     Troponin T 0.035 ng/mL     Narrative:       Troponin T Reference Range:  <= 0.03 ng/mL-   Negative for AMI  >0.03 ng/mL-     Abnormal for myocardial necrosis.  Clinicians would have to utilize clinical acumen, EKG, Troponin and serial changes to determine if it is an Acute Myocardial Infarction or myocardial injury due to an underlying chronic condition.       Results may be falsely decreased if patient taking Biotin.      Basic Metabolic Panel [[id number]]  (Abnormal) Collected:  09/07/20 0448    Specimen:  Blood Updated:  09/07/20 0527     Glucose 90 mg/dL      BUN --     Comment: Testing performed by alternate method        Creatinine 7.78 mg/dL      Sodium 142 mmol/L      Potassium 4.8 mmol/L      Chloride 102 mmol/L      CO2 25.0 mmol/L      Calcium 8.6 mg/dL      eGFR   Amer --     Comment: <15 Indicative of kidney failure.        eGFR Non African Amer 6 mL/min/1.73      Comment: <15 Indicative of kidney failure.        BUN/Creatinine Ratio --     Comment: Testing not performed        Anion Gap 15.0 mmol/L     Narrative:       GFR Normal >60  Chronic Kidney Disease <60  Kidney Failure <15      Magnesium [[id number]]  (Normal) Collected:  09/07/20 0448    Specimen:  Blood Updated:  09/07/20 0527     Magnesium 2.5 mg/dL     Phosphorus [[id number]]  (Abnormal) Collected:  09/07/20 0448    Specimen:  Blood  Updated:  09/07/20 0527     Phosphorus 4.7 mg/dL     Blood Culture - Blood, Blood, Central Line [[id number]] Collected:  09/07/20 0448    Specimen:  Blood, Central Line Updated:  09/07/20 0456    MRSA Screen, PCR (Inpatient) - Swab, Nares [[id number]]  (Normal) Collected:  09/06/20 2350    Specimen:  Swab from Nares Updated:  09/07/20 0256     MRSA PCR No MRSA Detected    POC Glucose Once [[id number]]  (Normal) Collected:  09/07/20 0105    Specimen:  Blood Updated:  09/07/20 0106     Glucose 95 mg/dL      Comment: Serial Number: [id number]:  [id number]       Blood Culture - Blood, Hand, Left [[id number]] Collected:  09/06/20 2349    Specimen:  Blood from Hand, Left Updated:  09/07/20 0042    Blood Culture - Blood, Blood, Central Line [[id number]] Collected:  09/06/20 2351    Specimen:  Blood, Central Line Updated:  09/07/20 0015    COVID-19,[facility] IN-HOUSE,Nasal Swab No Transport Media 3-4 HR TAT - Swab, Nasal Cavity [[id number]]  (Normal) Collected:  09/06/20 2148    Specimen:  Swab from Nasal Cavity Updated:  09/06/20 2226     COVID19 Not Detected    Narrative:       Fact sheet for providers: https://www.fda.gov/media/093373/download     Fact sheet for patients: https://www.fda.gov/media/880789/download    Troponin [[id number]]  (Abnormal) Collected:  09/06/20 1906    Specimen:  Blood from Hand, Right Updated:  09/06/20 2214     Troponin T 0.034 ng/mL     Narrative:       Troponin T Reference Range:  <= 0.03 ng/mL-   Negative for AMI  >0.03 ng/mL-     Abnormal for myocardial necrosis.  Clinicians would have to utilize clinical acumen, EKG, Troponin and serial changes to determine if it is an Acute Myocardial Infarction or myocardial injury due to an underlying chronic condition.       Results may be falsely decreased if patient taking Biotin.      Salicylate Level [[id number]]  (Normal) Collected:  09/06/20 1906    Specimen:  Blood from Hand, Right Updated:  09/06/20 2211     Salicylate <0.3 mg/dL      Acetaminophen Level [[id number]]  (Abnormal) Collected:  09/06/20 1906    Specimen:  Blood from Hand, Right Updated:  09/06/20 2211     Acetaminophen <5.0 mcg/mL     Narrative:       Acetaminophen Therapeutic Range  5-20 ug/mL      Hours after ingestion            Toxic Value    4 Hours                           150 ug/mL    8 Hours                            70 ug/mL   12 Hours                            40 ug/mL   16 Hours                            20 ug/mL    These values apply to a single ingestion only.     CK [[id number]]  (Normal) Collected:  09/06/20 1906    Specimen:  Blood from Hand, Right Updated:  09/06/20 2211     Creatine Kinase 52 U/L     Urinalysis With Culture If Indicated - Urine, Catheter In/Out [[id number]]  (Abnormal) Collected:  09/06/20 2101    Specimen:  Urine, Catheter In/Out Updated:  09/06/20 2119     Color, UA Yellow     Appearance, UA Clear     pH, UA 8.5     Specific Gravity, UA 1.015     Glucose, UA Negative     Ketones, UA Negative     Bilirubin, UA Negative     Blood, UA Moderate (2+)     Protein, UA >=300 mg/dL (3+)     Leuk Esterase, UA Large (3+)     Nitrite, UA Negative     Urobilinogen, UA 0.2 E.U./dL    Extra Tubes [[id number]] Collected:  09/06/20 1906    Specimen:  Blood from Hand, Right Updated:  09/06/20 2015    Narrative:       The following orders were created for panel order Extra Tubes.  Procedure                               Abnormality         Status                     ---------                               -----------         ------                     Gold Top - SST[[id number]]                                   Final result                 Please view results for these tests on the individual orders.    Gold Top - SST [[id number]] Collected:  09/06/20 1906    Specimen:  Blood from Hand, Right Updated:  09/06/20 2015     Extra Tube Hold for add-ons.     Comment: Auto resulted.       Comprehensive Metabolic Panel [[id number]]  (Abnormal) Collected:  09/06/20 1906     Specimen:  Blood from Hand, Right Updated:  09/06/20 1950     Glucose 100 mg/dL      BUN --     Comment: Testing performed by alternate method        Creatinine 7.27 mg/dL      Sodium 143 mmol/L      Potassium 5.7 mmol/L      Chloride 101 mmol/L      CO2 25.0 mmol/L      Calcium 8.8 mg/dL      Total Protein 7.2 g/dL      Albumin 3.90 g/dL      ALT (SGPT) <5 U/L      AST (SGOT) 10 U/L      Alkaline Phosphatase 323 U/L      Total Bilirubin 0.5 mg/dL      eGFR Non African Amer 6 mL/min/1.73      Comment: <15 Indicative of kidney failure.        eGFR   Amer --     Comment: <15 Indicative of kidney failure.        Globulin 3.3 gm/dL      A/G Ratio 1.2 g/dL      BUN/Creatinine Ratio --     Comment: Testing not performed        Anion Gap 17.0 mmol/L     Narrative:       GFR Normal >60  Chronic Kidney Disease <60  Kidney Failure <15      BUN [[id number]]  (Abnormal) Collected:  09/06/20 1906    Specimen:  Blood from Hand, Right Updated:  09/06/20 1942     BUN 43 mg/dL     Protime-INR [[id number]]  (Normal) Collected:  09/06/20 1906    Specimen:  Blood from Hand, Right Updated:  09/06/20 1927     Protime 10.9 Seconds      INR 1.01    aPTT [[id number]]  (Normal) Collected:  09/06/20 1906    Specimen:  Blood from Hand, Right Updated:  09/06/20 1927     PTT 26.5 seconds     CBC & Differential [[id number]] Collected:  09/06/20 1906    Specimen:  Blood from Hand, Right Updated:  09/06/20 1912    Narrative:       The following orders were created for panel order CBC & Differential.  Procedure                               Abnormality         Status                     ---------                               -----------         ------                     CBC Auto Differential[[id number]]        Abnormal            Final result                 Please view results for these tests on the individual orders.    CBC Auto Differential [[id number]]  (Abnormal) Collected:  09/06/20 1906    Specimen:  Blood from Hand, Right Updated:   09/06/20 1912     WBC 8.00 10*3/mm3      RBC 3.50 10*6/mm3      Hemoglobin 10.6 g/dL      Hematocrit 32.6 %      MCV 93.1 fL      MCH 30.1 pg      MCHC 32.4 g/dL      RDW 18.9 %      RDW-SD 62.6 fl      MPV 8.6 fL      Platelets 147 10*3/mm3      Neutrophil % 88.9 %      Lymphocyte % 6.9 %      Monocyte % 3.4 %      Eosinophil % 0.3 %      Basophil % 0.5 %      Neutrophils, Absolute 7.10 10*3/mm3      Lymphocytes, Absolute 0.60 10*3/mm3      Monocytes, Absolute 0.30 10*3/mm3      Eosinophils, Absolute 0.00 10*3/mm3      Basophils, Absolute 0.00 10*3/mm3      nRBC 0.0 /100 WBC           MICRO:  Microbiology Results (last 10 days)     Procedure Component Value - Date/Time    MRSA Screen, PCR (Inpatient) - Swab, Nares [[id number]]  (Normal) Collected:  09/06/20 2350    Lab Status:  Final result Specimen:  Swab from Nares Updated:  09/07/20 0256     MRSA PCR No MRSA Detected    COVID-19,[facility] IN-HOUSE,Nasal Swab No Transport Media 3-4 HR TAT - Swab, Nasal Cavity [[id number]]  (Normal) Collected:  09/06/20 2148    Lab Status:  Final result Specimen:  Swab from Nasal Cavity Updated:  09/06/20 2226     COVID19 Not Detected    Narrative:       Fact sheet for providers: https://www.fda.gov/media/462449/download     Fact sheet for patients: https://www.fda.gov/media/312329/download          IMAGING & OTHER STUDIES    Imaging Results (Last 72 Hours)     Procedure Component Value Units Date/Time    XR Chest 1 View [[id number]] Collected:  09/07/20 0800     Updated:  09/07/20 0804    Narrative:       DATE OF EXAM:  9/6/2020 8:23 PM     PROCEDURE:  XR CHEST 1 VW-     INDICATIONS:  Central line placement     COMPARISON:  No Comparisons Available     TECHNIQUE:   Single radiographic view of the chest was obtained.     FINDINGS:  There is a right subclavian catheter with tip projecting in the right  atrium. Catheter tip appears to be approximate 6 cm inferior to the area  of the cavoatrial junction on this exam. There is also a left  IJ port  catheter with tip projecting in the superior right atrium. No  pneumothorax is seen.  The heart is enlarged.  There are diffuse  interstitial and bibasilar airspace opacities. No significant effusion  or pneumothorax is seen. There is right convex scoliosis of the upper  thoracic spine. Left upper arm vascular stent is incompletely  visualized.       Impression:       1.Right subclavian catheter projects in the right atrium. Catheter could  be withdrawn approximately 6 cm to be in the area of the cavoatrial  junction on this exam.  2.Left IJ port catheter terminates in the superior right atrium.  3.No pneumothorax is seen.  4.Cardiomegaly.  5.Diffuse interstitial and bibasilar airspace opacities which could  indicate edema or pneumonia.     Electronically Signed By-DR. Mani Leo MD On:9/7/2020 8:02 AM  This report was finalized on [id number] by DR. Mani Leo MD.    CT Head Without Contrast [[id number]] Collected:  09/06/20 1821     Updated:  09/06/20 2030    Narrative:       EXAMINATION: CT HEAD WITHOUT CONTRAST    DATE: 9/6/2020 7:59 PM    INDICATION: Hypertension and seizure    COMPARISON: None available at the time of this dictation.    TECHNIQUE: Noncontrast imaging obtained from the vertex to the skull base.  CT dose lowering techniques were used, to include: automated exposure control, adjustment for patient size, and or use of iterative reconstruction.?    FINDINGS:    Soft Tissues: No significant soft tissue abnormality.    Skull: Right frontal craniotomy    Sinuses: Paranasal sinuses are clear.    Mastoids: Mastoid air cells are clear.     Globes and Orbits: Globes and orbits are intact.    Brain: No acute hemorrhage.  No midline shift, masses, or mass effect.  No evidence of acute infarct by noncontrast CT.    Ventricles and Cisterns: Ventricular size and configuration is within normal limits. Basal cisterns are patent. No abnormal extra-axial fluid collection.            Impression:            1. No acute intracranial abnormality.    2. Changes of prior right frontal craniotomy.      Electronically signed by:  Yohannes Guerrero M.D.    9/6/2020 6:29 PM            ASSESSMENT/PLAN  Seizure (CMS/HCC)  -Patient with history of same  -Keppra and Vimpat at outpatient  -?compliance  -Keppra 500 mg IV bid and Vimapt 100 mg IV BID and switch to oral when appropriate  -EEG if no improvement in mental status   -CK level 52    Hypertensive emergency  -/129 on admission  -Cardene gtt, cont  -Currently SBP in   -start Clonidine     Pneumonia  -? aspiration  -Started on Cefepime and Clindamycin given h/o seizures. Change abx to Unasyn   -COVID19 swab Negative  -RVP ordered  -Urine antigen for Strep and Legionella pending  -MRSA nasal swab Negative    Encephalopathy  -CT head negative for acute findings  -? postictal state vs hypertensive encephalopathy  -Utox, ASA pending  -APAP < 5    ESRD with HD dependence  -Will consult Dr. Tello = sees Dr. Rod Ch    Anemia  -Hemoglobin 10.6 on admission  -No signs of active bleeding  -EGD 07/14/20 at [place] without acute findings    Hyperparathyroidism  -Continue Sensipar and Calcitriol when appropriate    Hyperphosphatemia  -Continue phos binders when appropriate    Anxiety/depression  -Continue Lexapro and Trazodone when appropriate       PUD: Protonix  Insulin:   Sliding scale  VTE:   SCDs/  Lovenox  Nutrition:  NPO, speech therapy to see for diet eval

## 2020-09-07 NOTE — H&P
PULMONARY/ CRITICAL CARE ADMISSION H&P NOTE        Patient Name:  Maura Garibya    :  1975    Medical Record:  [id number]    PRIMARY CARE PHYSICIAN     Danial Landeros MD    BRANDT Garibay is a 45 y.o. female with a PMH significant for ESRD on HD MWF, seizures, hypertension, GERD, hyperparathyroidism, IBS, anemia, hyperphosphatemia, HFpEF, depression, TTP, anxiety/depression and tobacco abuse who presented to the ER after being found by her neighbor shaking with seizure like activity.  EMS was called and when they arrived she was some jerking of her extremities.  Upon arrival to the ER she was evaluated and had seizure like activity and was given two doses of Ativan and a gram of Keppra.  Currently patient is unable to provide details regarding her medical history due to being obtunded.      REVIEW OF SYSTEMS  UTO    HOME MEDICATIONS  Amitriptyline (ELAVIL) 25 MG tablet qhs    Aspirin 81 MG qday    AURYXIA 1  MG(Fe) tablet two tablets with meals    calcitriol (ROCALTROL) 0.25 MCG capsule 1 capsule by mouth 3 (three) times a week     cinacalcet (SENSIPAR) 30 MG tablet  by mouth 3 (three) times a week    cloNIDine (CATAPRES) 0.2 mg by mouth 3 (three) times daily     dilTIAZem ER beads (TIAZAC) 240 MG 24 hr capsule QD    diphenhydrAMINE (BENADRYL) 50 MG   every 4 (four) hours as needed for Itching    docusate sodium (COLACE) 100 MG capsule QD    escitalopram (LEXAPRO) 10 MG tablet QD    esomeprazole (NEXIUM) 40 MG QD    gabapentin (NEURONTIN) 600 MG tablet mg 4 (four) times daily    hydrALAZINE (APRESOLINE) 100 MG tablet BID    lacosamide (VIMPAT) 100 mg BID    levETIRAcetam 500 mg BID    linaclotide (LINZESS) 290 MCG QD    metoclopramide 5 MG one tablet four times daily before meals and nightly    promethazine 25 mg by mouth every 6 hours as needed for nausea    Requip 0.25 MG tablet nightly    TRAZODONE HCL PO 50 mg nightly    MEDICAL HISTORY    Past Medical History:   Diagnosis Date   •  (HFpEF) heart failure with preserved ejection fraction (CMS/HCC)    • Acid reflux    • Anxiety and depression    • Cardiomyopathy (CMS/HCC)    • Chronic pain    • Dependence on renal dialysis (CMS/HCC)    • Hyperparathyroidism (CMS/HCC)    • Hypertension    • IBS (irritable bowel syndrome)    • Migraines    • Neuropathy    • Pulmonary emboli (CMS/HCC)    • Renal failure    • Seizures (CMS/HCC)    • TTP (thrombotic thrombocytopenic purpura) (CMS/HCC)         SURGICAL HISTORY    Past Surgical History:   Procedure Laterality Date   • APPENDECTOMY     • ARTERIOVENOUS FISTULA     • BRAIN SURGERY      Fistula placed   • BRAIN SURGERY      Cyst   • CHOLECYSTECTOMY     • COLONOSCOPY     • HYSTERECTOMY     • SALPINGO OOPHORECTOMY     • TONSILLECTOMY          FAMILY HISTORY    Family History   Problem Relation Age of Onset   • Kidney disease Mother    • Hypertension Mother    • Heart disease Father    • Heart disease Sister    • Heart disease Brother         SOCIAL HISTORY    Social History     Socioeconomic History   • Marital status:      Spouse name: Not on file   • Number of children: Not on file   • Years of education: Not on file   • Highest education level: Not on file   Tobacco Use   • Smoking status: Current Every Day Smoker     Packs/day: 0.50   Substance and Sexual Activity   • Alcohol use: No   • Drug use: No        ALLERGIES    Allergies   Allergen Reactions   • Butorphanol Hives   • Codeine    • Contrast Dye      IV CONTRAST   • Golytely [Peg 3350-Electrolytes] Unknown - Low Severity   • Haldol [Haloperidol] Hives   • Morphine And Related    • Sulfa Antibiotics    • Toradol [Ketorolac Tromethamine]    • Vancomycin Hives         PHYSICAL EXAM    tMax 24 hrs:  Temp (24hrs), Av.2 °F (37.3 °C), Min:98 °F (36.7 °C), Max:100.4 °F (38 °C)    Vitals Ranges:  Temp:  [98 °F (36.7 °C)-100.4 °F (38 °C)] 100.4 °F (38 °C)  Heart Rate:  [100-104] 104  Resp:  [18-27] 18  BP: (122-257)/() 182/68  Intake and  Output Last 3 Shifts:  No intake/output data recorded.    Constitutional:  Obtunded.  Able to protect airway.    HEENT:   Atraumatic, PERRL, conjunctiva normal, moist oral mucosa, no nasal discharge.  Trachea is midline.  Respiratory:   No respiratory distress, normal breath sounds, no rales, no wheezing   Cardiovascular:  Tachy. Pulses 2+ and equal in all four extremities.   HARSH fistula with positive thrill and bruit.    GI:   Soft, nondistended, positive bowel sounds.  Extremities:   No edema, cyanosis or tenderness.  Integument:   No rashes.   Neurologic:  Obtunded.  Moves all four extremities to painful stimuli.      LABS    Lab Results (last 24 hours)     Procedure Component Value Units Date/Time    Troponin [[id number]]  (Abnormal) Collected:  09/06/20 1906    Specimen:  Blood from Hand, Right Updated:  09/06/20 2214     Troponin T 0.034 ng/mL     Narrative:       Troponin T Reference Range:  <= 0.03 ng/mL-   Negative for AMI  >0.03 ng/mL-     Abnormal for myocardial necrosis.  Clinicians would have to utilize clinical acumen, EKG, Troponin and serial changes to determine if it is an Acute Myocardial Infarction or myocardial injury due to an underlying chronic condition.       Results may be falsely decreased if patient taking Biotin.      Salicylate Level [[id number]]  (Normal) Collected:  09/06/20 1906    Specimen:  Blood from Hand, Right Updated:  09/06/20 2211     Salicylate <0.3 mg/dL     Acetaminophen Level [[id number]]  (Abnormal) Collected:  09/06/20 1906    Specimen:  Blood from Hand, Right Updated:  09/06/20 2211     Acetaminophen <5.0 mcg/mL     Narrative:       Acetaminophen Therapeutic Range  5-20 ug/mL      Hours after ingestion            Toxic Value    4 Hours                           150 ug/mL    8 Hours                            70 ug/mL   12 Hours                            40 ug/mL   16 Hours                            20 ug/mL    These values apply to a single ingestion only.     CK  [[id number]]  (Normal) Collected:  09/06/20 1906    Specimen:  Blood from Hand, Right Updated:  09/06/20 2211     Creatine Kinase 52 U/L     COVID-19,[facility] IN-HOUSE,Nasal Swab No Transport Media 3-4 HR TAT - Swab, Nasal Cavity [[id number]] Collected:  09/06/20 2148    Specimen:  Swab from Nasal Cavity Updated:  09/06/20 2152    Urinalysis With Culture If Indicated - Urine, Catheter In/Out [[id number]]  (Abnormal) Collected:  09/06/20 2101    Specimen:  Urine, Catheter In/Out Updated:  09/06/20 2119     Color, UA Yellow     Appearance, UA Clear     pH, UA 8.5     Specific Gravity, UA 1.015     Glucose, UA Negative     Ketones, UA Negative     Bilirubin, UA Negative     Blood, UA Moderate (2+)     Protein, UA >=300 mg/dL (3+)     Leuk Esterase, UA Large (3+)     Nitrite, UA Negative     Urobilinogen, UA 0.2 E.U./dL    Extra Tubes [[id number]] Collected:  09/06/20 1906    Specimen:  Blood from Hand, Right Updated:  09/06/20 2015    Narrative:       The following orders were created for panel order Extra Tubes.  Procedure                               Abnormality         Status                     ---------                               -----------         ------                     Gold Top - SST[[id number]]                                   Final result                 Please view results for these tests on the individual orders.    Gold Top - SST [[id number]] Collected:  09/06/20 1906    Specimen:  Blood from Hand, Right Updated:  09/06/20 2015     Extra Tube Hold for add-ons.     Comment: Auto resulted.       Comprehensive Metabolic Panel [[id number]]  (Abnormal) Collected:  09/06/20 1906    Specimen:  Blood from Hand, Right Updated:  09/06/20 1950     Glucose 100 mg/dL      BUN --     Comment: Testing performed by alternate method        Creatinine 7.27 mg/dL      Sodium 143 mmol/L      Potassium 5.7 mmol/L      Chloride 101 mmol/L      CO2 25.0 mmol/L      Calcium 8.8 mg/dL      Total Protein 7.2 g/dL      Albumin  3.90 g/dL      ALT (SGPT) <5 U/L      AST (SGOT) 10 U/L      Alkaline Phosphatase 323 U/L      Total Bilirubin 0.5 mg/dL      eGFR Non African Amer 6 mL/min/1.73      Comment: <15 Indicative of kidney failure.        eGFR   Amer --     Comment: <15 Indicative of kidney failure.        Globulin 3.3 gm/dL      A/G Ratio 1.2 g/dL      BUN/Creatinine Ratio --     Comment: Testing not performed        Anion Gap 17.0 mmol/L     Narrative:       GFR Normal >60  Chronic Kidney Disease <60  Kidney Failure <15      BUN [[id number]]  (Abnormal) Collected:  09/06/20 1906    Specimen:  Blood from Hand, Right Updated:  09/06/20 1942     BUN 43 mg/dL     Protime-INR [[id number]]  (Normal) Collected:  09/06/20 1906    Specimen:  Blood from Hand, Right Updated:  09/06/20 1927     Protime 10.9 Seconds      INR 1.01    aPTT [[id number]]  (Normal) Collected:  09/06/20 1906    Specimen:  Blood from Hand, Right Updated:  09/06/20 1927     PTT 26.5 seconds     CBC & Differential [[id number]] Collected:  09/06/20 1906    Specimen:  Blood from Hand, Right Updated:  09/06/20 1912    Narrative:       The following orders were created for panel order CBC & Differential.  Procedure                               Abnormality         Status                     ---------                               -----------         ------                     CBC Auto Differential[[id number]]        Abnormal            Final result                 Please view results for these tests on the individual orders.    CBC Auto Differential [[id number]]  (Abnormal) Collected:  09/06/20 1906    Specimen:  Blood from Hand, Right Updated:  09/06/20 1912     WBC 8.00 10*3/mm3      RBC 3.50 10*6/mm3      Hemoglobin 10.6 g/dL      Hematocrit 32.6 %      MCV 93.1 fL      MCH 30.1 pg      MCHC 32.4 g/dL      RDW 18.9 %      RDW-SD 62.6 fl      MPV 8.6 fL      Platelets 147 10*3/mm3      Neutrophil % 88.9 %      Lymphocyte % 6.9 %      Monocyte % 3.4 %      Eosinophil % 0.3 %       Basophil % 0.5 %      Neutrophils, Absolute 7.10 10*3/mm3      Lymphocytes, Absolute 0.60 10*3/mm3      Monocytes, Absolute 0.30 10*3/mm3      Eosinophils, Absolute 0.00 10*3/mm3      Basophils, Absolute 0.00 10*3/mm3      nRBC 0.0 /100 WBC           MICRO:  Microbiology Results (last 10 days)     ** No results found for the last 240 hours. **          IMAGING & OTHER STUDIES    Imaging Results (Last 72 Hours)     Procedure Component Value Units Date/Time    CT Head Without Contrast [[id number]] Collected:  09/06/20 1821     Updated:  09/06/20 2030    Narrative:       EXAMINATION: CT HEAD WITHOUT CONTRAST    DATE: 9/6/2020 7:59 PM    INDICATION: Hypertension and seizure    COMPARISON: None available at the time of this dictation.    TECHNIQUE: Noncontrast imaging obtained from the vertex to the skull base.  CT dose lowering techniques were used, to include: automated exposure control, adjustment for patient size, and or use of iterative reconstruction.?    FINDINGS:    Soft Tissues: No significant soft tissue abnormality.    Skull: Right frontal craniotomy    Sinuses: Paranasal sinuses are clear.    Mastoids: Mastoid air cells are clear.     Globes and Orbits: Globes and orbits are intact.    Brain: No acute hemorrhage.  No midline shift, masses, or mass effect.  No evidence of acute infarct by noncontrast CT.    Ventricles and Cisterns: Ventricular size and configuration is within normal limits. Basal cisterns are patent. No abnormal extra-axial fluid collection.            Impression:           1. No acute intracranial abnormality.    2. Changes of prior right frontal craniotomy.      Electronically signed by:  Yohannes Guerrero M.D.    9/6/2020 6:29 PM    XR Chest 1 View [[id number]] Resulted:  09/06/20 2025     Updated:  09/06/20 2026            ASSESSMENT/PLAN  Seizure (CMS/HCC)  -Patient with history of same  -Keppra and Vimpat at outpatient  -?compliance  -Keppra 500 mg IV bid and Vimapt 100 mg IV BID and  switch to oral when appropriate  -EEG if no improvement in mental status   -CK level pending    Hypertensive emergency  -/129 on admission  -Cardene gtt started  -Currently SBP in   -Resume home oral BP meds when appropriate     Pneumonia  -? aspiration  -Start on Cefepime and Clindamycin given h/o seizures   -COVID19 swab pending  -RVP pending  -Urine antigen for Strep and Legionella pending  -MRSA nasal swab pending    Encephalopathy  -CT head negative for acute findings  -? postictal state vs hypertensive encephalopathy  -Utox, ASA and APAP pending    ESRD with HD dependence  -Will consult Dr. Tello = sees Dr. Rod Ch    Anemia  -Hemoglobin 10.6 on admission  -No signs of active bleeding  -EGD 07/14/20 at [place] without acute findings    Hyperparathyroidism  -Continue Sensipar and Calcitriol when appropriate    Hyperphosphatemia  -Continue phos binders when appropriate    Anxiety/depression  -Continue Lexapro and Trazodone when appropriate       PUD: Protonix  Insulin:   Sliding scale  VTE:   SCDs/  Lovenox  Nutrition:  NPO              NAIN Barron    Critical care time 35 minutes excluding time for earl

## 2020-09-07 NOTE — PLAN OF CARE
Problem: Patient Care Overview  Goal: Plan of Care Review  Outcome: Ongoing (interventions implemented as appropriate)  Flowsheets (Taken 9/7/2020 9348)  Progress: improving  Plan of Care Reviewed With: patient; sibling  Outcome Summary: No seizure activity noted so far today. Pt remains on Cardene gtt. Tripple lumen central line to remain in place until Cardene gtt off. Pt refuses to participate in bedside swallow screen so she remains NPO. Speech therapy to see pt when they are able and pt is more cooperative. Pt follows some commands now but is reluctant to participate in most care. Pt has been sleeping most of the day but is very dramatic when stimulated for care. VSS at this time. Will continue to monitor.

## 2020-09-07 NOTE — ED NOTES
Dr. Cotter asked patient numerous times who her kidney doctor was. Patient would open her eyes but immediately close them without answering. I've asked the patient the same question and received the same response.      Shaji Torres RN  09/06/20 1213

## 2020-09-07 NOTE — PROCEDURES
Right subclavian central placed in the emergency department.  Chest x-ray showed it to be in the right atrium.    Central line dressing and sutures removed. Chlora-prep was used to clean the area.  The catheter was pulled back 5 cm and sutured in place.  1% lidocaine was used without epi for local infiltration prior to suturing in place.  New dressing applied.  Patient tolerated well. No complications seen.  New chest -xray ordered.    Sterile technique was used for this procedure.

## 2020-09-07 NOTE — NURSING NOTE
Bedside swallow screen attempted by RN. Pt refused to participate. Pt will open eyes if her name is said loudly. She now knows where she is but refuses to answer any further questions and goes back to sleep. Will consult ST per Dr. Gomez for swallow evaluation. Pt will be kept NPO.

## 2020-09-07 NOTE — CONSULTS
Primary Care Provider: Danial Landeros MD     Consult requested by: Dr. Plaza     Reason for Consultation: Neurological evaluation, seizures    History taken from: chart RN    Chief complaint: seizures       SUBJECTIVE:    History of present illness: Per H&P: Maura Garibay is a 45 y.o. female with a PMH significant for ESRD on HD MWF, seizures, hypertension, GERD, hyperparathyroidism, IBS, anemia, hyperphosphatemia, HFpEF, depression, TTP, anxiety/depression and tobacco abuse who presented to the ER after being found by her neighbor shaking with seizure like activity.  EMS was called and when they arrived she was some jerking of her extremities.  Upon arrival to the ER she was evaluated and had seizure like activity and was given two doses of Ativan and a gram of Keppra.  Currently patient is unable to provide details regarding her medical history due to being obtunded.      Per ED documentation: 45-year-old female with a history of renal failure on dialysis and hypertension apparently was found on the floor by her roommate shaking and having what she thought was seizure activity.  EMS was called and the patient had some atypical type of jerking.  When I saw the patient in the emergency department she was having unusual jerks of her arms and legs and would occasionally open her eyes to command but she did not demonstrate classic generalized tonic-clonic seizure activity.  I reviewed her old chart and there is no mention of seizure disorder and no anticonvulsant medication listed.  The patient does have chronic renal failure on dialysis with a shunt in her right arm.    Portions of the above HPI have been copied from previous encounters and edited as appropriate.    Patient is a poor historian, she does shake her head yes to having history of seizures and taking Keppra.  Nursing staff spoke to patient's sister who states that this happens fairly often and the patient may miss her dialysis appointment or  quit taking her medications and she has breakthrough seizures.    Review of Systems   Unable to perform ROS: Other         PATIENT HISTORY:  Past Medical History:   Diagnosis Date   • (HFpEF) heart failure with preserved ejection fraction (CMS/HCC)    • Acid reflux    • Anxiety and depression    • Cardiomyopathy (CMS/HCC)    • Chronic pain    • Dependence on renal dialysis (CMS/HCC)    • Hyperparathyroidism (CMS/HCC)    • Hypertension    • IBS (irritable bowel syndrome)    • Migraines    • Neuropathy    • Pulmonary emboli (CMS/HCC)    • Renal failure    • Seizures (CMS/HCC)    • TTP (thrombotic thrombocytopenic purpura) (CMS/HCC)    , Past Surgical History:   Procedure Laterality Date   • APPENDECTOMY     • ARTERIOVENOUS FISTULA     • BRAIN SURGERY      Fistula placed   • BRAIN SURGERY      Cyst   • CHOLECYSTECTOMY     • COLONOSCOPY     • HYSTERECTOMY     • SALPINGO OOPHORECTOMY     • TONSILLECTOMY     , Family History   Problem Relation Age of Onset   • Kidney disease Mother    • Hypertension Mother    • Heart disease Father    • Heart disease Sister    • Heart disease Brother    , Social History     Tobacco Use   • Smoking status: Current Every Day Smoker     Packs/day: 0.50   Substance Use Topics   • Alcohol use: No   • Drug use: No   , Medications Prior to Admission   Medication Sig Dispense Refill Last Dose   • amitriptyline (ELAVIL) 25 MG tablet Take 25 mg by mouth Every Night.      • cloNIDine (CATAPRES) 0.2 MG tablet Take 0.2 mg by mouth 2 (Two) Times a Day.      • escitalopram (LEXAPRO) 20 MG tablet Take 20 mg by mouth Daily.      • gabapentin (NEURONTIN) 600 MG tablet Take 600 mg by mouth 3 (Three) Times a Day.      • HYDROcodone-acetaminophen (NORCO)  MG per tablet Take 1 tablet by mouth 3 (Three) Times a Day.      • lisinopril (PRINIVIL,ZESTRIL) 40 MG tablet Take 40 mg by mouth Every Night.      • pantoprazole (PROTONIX) 20 MG EC tablet Take 20 mg by mouth 2 (two) times a day.      • promethazine  (PHENERGAN) 25 MG tablet Take 25 mg by mouth Every 8 (Eight) Hours As Needed for Nausea or Vomiting.      • rOPINIRole (REQUIP) 0.25 MG tablet Take 0.25 mg by mouth Every Night. Take 1 hour before bedtime.      , Scheduled Meds:    amitriptyline 25 mg Oral Nightly   ampicillin-sulbactam 1.5 g Intravenous Q12H   cloNIDine 0.2 mg Oral Q12H   escitalopram 20 mg Oral Daily   gabapentin 100 mg Oral TID   heparin (porcine) 5,000 Units Subcutaneous Q8H   HYDROcodone-acetaminophen 1 tablet Oral TID   insulin lispro 0-7 Units Subcutaneous Q6H   lacosamide (VIMPAT) IVPB 100 mg Intravenous Q12H   levETIRAcetam 500 mg Intravenous Q12H   [START ON 9/8/2020] pantoprazole 40 mg Oral Q AM   rOPINIRole 0.25 mg Oral Nightly   sodium chloride 10 mL Intravenous Q12H   , Continuous Infusions:    niCARdipine 5-15 mg/hr Last Rate: 2.5 mg/hr (09/07/20 0754)   , PRN Meds:  •  acetaminophen  •  dextrose  •  dextrose  •  glucagon (human recombinant)  •  insulin lispro **AND** insulin lispro  •  ondansetron **OR** ondansetron  •  [COMPLETED] Insert peripheral IV **AND** sodium chloride  •  sodium chloride, Allergies:  Butorphanol; Codeine; Contrast dye; Golytely [peg 3350-electrolytes]; Haldol [haloperidol]; Morphine and related; Sulfa antibiotics; Toradol [ketorolac tromethamine]; and Vancomycin    ________________________________________________________        OBJECTIVE:    PHYSICAL EXAM:    Constitutional: The patient is in no apparent distress, she is sleeping, awakes to voice. Limited participation inexam.     Head: Normocephalic, atraumatic.     Chest: No respiratory distress.    Cardiac: Regular rate and rhythm.     Extremities:  No clubbing, cyanosis, or edema.     NEUROLOGICAL:    Cognition:   Eyes open to voice  States her full name  Follows simple commands    Cranial nerves;  No facial asymmetry   Pupils equal and reactive  Tracking   Exam limited given mental status    Motor: Squeezes hands bilaterally with equal strength, wiggles  toes.  Moves all extremities spontaneously.       Physical exam performed by ESVIN Marin.     ________________________________________________________   RESULTS REVIEW:    VITAL SIGNS:   Temp:  [98 °F (36.7 °C)-100.4 °F (38 °C)] 98.4 °F (36.9 °C)  Heart Rate:  [] 95  Resp:  [18-27] 18  BP: (114-257)/() 120/92     LABS:  WBC   Date Value Ref Range Status   09/07/2020 5.80 3.40 - 10.80 10*3/mm3 Final     RBC   Date Value Ref Range Status   09/07/2020 3.26 (L) 3.77 - 5.28 10*6/mm3 Final     Hemoglobin   Date Value Ref Range Status   09/07/2020 9.9 (L) 12.0 - 15.9 g/dL Final     Hematocrit   Date Value Ref Range Status   09/07/2020 30.3 (L) 34.0 - 46.6 % Final     MCV   Date Value Ref Range Status   09/07/2020 92.9 79.0 - 97.0 fL Final     MCH   Date Value Ref Range Status   09/07/2020 30.2 26.6 - 33.0 pg Final     MCHC   Date Value Ref Range Status   09/07/2020 32.5 31.5 - 35.7 g/dL Final     RDW   Date Value Ref Range Status   09/07/2020 18.5 (H) 12.3 - 15.4 % Final     RDW-SD   Date Value Ref Range Status   09/07/2020 60.8 (H) 37.0 - 54.0 fl Final     MPV   Date Value Ref Range Status   09/07/2020 8.6 6.0 - 12.0 fL Final     Platelets   Date Value Ref Range Status   09/07/2020 134 (L) 140 - 450 10*3/mm3 Final     Neutrophil %   Date Value Ref Range Status   09/06/2020 88.9 (H) 42.7 - 76.0 % Final     Lymphocyte %   Date Value Ref Range Status   09/06/2020 6.9 (L) 19.6 - 45.3 % Final     Monocyte %   Date Value Ref Range Status   09/06/2020 3.4 (L) 5.0 - 12.0 % Final     Eosinophil %   Date Value Ref Range Status   09/06/2020 0.3 0.3 - 6.2 % Final     Basophil %   Date Value Ref Range Status   09/06/2020 0.5 0.0 - 1.5 % Final     Neutrophils Absolute   Date Value Ref Range Status   09/07/2020 4.70 1.70 - 7.00 10*3/mm3 Final     Neutrophils, Absolute   Date Value Ref Range Status   09/06/2020 7.10 (H) 1.70 - 7.00 10*3/mm3 Final     Lymphocytes, Absolute   Date Value Ref Range Status   09/06/2020 0.60 (L)  0.70 - 3.10 10*3/mm3 Final     Monocytes, Absolute   Date Value Ref Range Status   09/06/2020 0.30 0.10 - 0.90 10*3/mm3 Final     Eosinophils Absolute   Date Value Ref Range Status   09/07/2020 0.06 0.00 - 0.40 10*3/mm3 Final     Eosinophils, Absolute   Date Value Ref Range Status   09/06/2020 0.00 0.00 - 0.40 10*3/mm3 Final     Basophils, Absolute   Date Value Ref Range Status   09/06/2020 0.00 0.00 - 0.20 10*3/mm3 Final     nRBC   Date Value Ref Range Status   09/06/2020 0.0 0.0 - 0.2 /100 WBC Final     Glucose   Date Value Ref Range Status   09/07/2020 90 65 - 99 mg/dL Final     BUN   Date Value Ref Range Status   09/07/2020   Final     Comment:     Testing performed by alternate method   09/07/2020 47 (H) 6 - 20 mg/dL Final     Creatinine   Date Value Ref Range Status   09/07/2020 7.78 (H) 0.57 - 1.00 mg/dL Final     Sodium   Date Value Ref Range Status   09/07/2020 142 136 - 145 mmol/L Final     Potassium   Date Value Ref Range Status   09/07/2020 4.8 3.5 - 5.2 mmol/L Final     Chloride   Date Value Ref Range Status   09/07/2020 102 98 - 107 mmol/L Final     CO2   Date Value Ref Range Status   09/07/2020 25.0 22.0 - 29.0 mmol/L Final     Calcium   Date Value Ref Range Status   09/07/2020 8.6 8.6 - 10.5 mg/dL Final     Total Protein   Date Value Ref Range Status   09/06/2020 7.2 6.0 - 8.5 g/dL Final     Albumin   Date Value Ref Range Status   09/06/2020 3.90 3.50 - 5.20 g/dL Final     ALT (SGPT)   Date Value Ref Range Status   09/06/2020 <5 1 - 33 U/L Final     AST (SGOT)   Date Value Ref Range Status   09/06/2020 10 1 - 32 U/L Final     Alkaline Phosphatase   Date Value Ref Range Status   09/06/2020 323 (H) 39 - 117 U/L Final     Total Bilirubin   Date Value Ref Range Status   09/06/2020 0.5 0.0 - 1.2 mg/dL Final     eGFR Non  Amer   Date Value Ref Range Status   09/07/2020 6 (L) >60 mL/min/1.73 Final     Comment:     <15 Indicative of kidney failure.     BUN/Creatinine Ratio   Date Value Ref Range  Status   2020   Final     Comment:     Testing not performed     Anion Gap   Date Value Ref Range Status   2020 15.0 5.0 - 15.0 mmol/L Final       Lab Results   Component Value Date     (H) 2020    [id number] 2020         IMAGING STUDIES:  Ct Head Without Contrast    Result Date: 2020  1. No acute intracranial abnormality. 2. Changes of prior right frontal craniotomy. Electronically signed by:  Yohannes Guerrero M.D.  2020 6:29 PM    Xr Chest 1 View    Result Date: 2020  1.Right subclavian catheter projects in the right atrium. Catheter could be withdrawn approximately 6 cm to be in the area of the cavoatrial junction on this exam. 2.Left IJ port catheter terminates in the superior right atrium. 3.No pneumothorax is seen. 4.Cardiomegaly. 5.Diffuse interstitial and bibasilar airspace opacities which could indicate edema or pneumonia.  Electronically Signed By-DR. Mani Leo MD On:2020 8:02 AM This report was finalized on  by DR. Mani Leo MD.      I reviewed the patient's new clinical results.      ________________________________________________________     PROBLEM LIST:    Seizure (CMS/HCC)          Assessment/Plan   ASSESSMENT/PLAN:  1.  Breakthrough seizure likely 2/2 noncompliance. Other causes of breakthrough seizures include infection, electrolyte abnormalities (missed HD).   - CT head: No acute findings, changes of prior from right frontal craniotomy.   - No EEG needed at this time, patient is arousable, awakes to voice and follows commands.   - EKG: Sinus tachycardia, rate 104  - Labs: A1C: P, B12: P, TSH: P, Ma.5, Phos: 4.7, Na 142, Ca: 8.6  - Continue Keppra 500 mg bid & Vimpat 100 mg bid   - Follow up with Neurology outpatient for management  - Seizure precuations (see below)   - Maintain healthy lifestyle including stress management, routine sleep schedule, and a well balanced diet.  Alcohol and drugs lowers the seizure threshold  and should be avoided. Take all medications as prescribed.    2.  Hypertensive emergency  -Cardene drip per primary, clonidine but started, monitoring closely    3.  Pneumonia  -Antibiotics per primary    4.  Metabolic encephalopathy, likely second to above.   -CT head negative,   -Avoid sedating medications    SEIZURE/SYNCOPE INSTRUCTIONS:  -Recommended to observe all seizure precautions, including, but not limited to:   -No driving until seizure free for more than 3 months- as per State driving regulation / law;   -Avoid all high-risk activity, Take showers instead of baths, Avoid swimming without observation, Avoid open heat sources, Avoid working at heights, and Avoid engaging in all potentially hazardous activities.   - patient will need further education once more awake.     Will follow as needed, please call with any questions, concerns, neurological changes.  Thank you for this consult.     Flor Del Rosario, NAIN  09/07/20  09:54

## 2020-09-07 NOTE — CONSULTS
Referring Provider: Dr. KANDI Morales MD  Reason for Consultation: ESRD    Chief complaint.  Status post seizure    History of present illness:    Patient is 45 years old female with history of ESRD, seizure disorder, hypertension, hyperparathyroidism, and depression who was brought to the ER after she was found to have seizure-like activity by her neighbor.  In the ER patient was given Ativan and Keppra.  Patient is lethargic although responds to verbal stimulus.  Patient not able to provide any history.  Patient blood pressure was very on presentation.  Patient on Cardene drip right now    History  Past Medical History:   Diagnosis Date   • (HFpEF) heart failure with preserved ejection fraction (CMS/HCC)    • Acid reflux    • Anxiety and depression    • Cardiomyopathy (CMS/HCC)    • Chronic pain    • Dependence on renal dialysis (CMS/HCC)    • Hyperparathyroidism (CMS/HCC)    • Hypertension    • IBS (irritable bowel syndrome)    • Migraines    • Neuropathy    • Pulmonary emboli (CMS/HCC)    • Renal failure    • Seizures (CMS/HCC)    • TTP (thrombotic thrombocytopenic purpura) (CMS/HCC)      Past Surgical History:   Procedure Laterality Date   • APPENDECTOMY     • ARTERIOVENOUS FISTULA     • BRAIN SURGERY      Fistula placed   • BRAIN SURGERY      Cyst   • CHOLECYSTECTOMY     • COLONOSCOPY     • HYSTERECTOMY     • SALPINGO OOPHORECTOMY     • TONSILLECTOMY       Social History     Tobacco Use   • Smoking status: Current Every Day Smoker     Packs/day: 0.50   Substance Use Topics   • Alcohol use: No   • Drug use: No     Family History   Problem Relation Age of Onset   • Kidney disease Mother    • Hypertension Mother    • Heart disease Father    • Heart disease Sister    • Heart disease Brother        Review of Systems  ROS  Not obtainable  Objective     Vital Signs  Temp:  [98 °F (36.7 °C)-100.4 °F (38 °C)] 98.4 °F (36.9 °C)  Heart Rate:  [] 95  Resp:  [18-27] 18  BP: (114-257)/() 120/92    No intake/output  data recorded.  I/O last 3 completed shifts:  In: 437 [I.V.:437]  Out: -     Physical Exam:  Physical Exam  General.  Lethargic but responds to verbal stimulus  Head.  Atraumatic, normocephalic  Neck.  Supple  Respiratory.  Clear to auscultation  Cardiovascular regular rhythm  Abdominal.  Obese, soft and nontender  Extremities.  Trace edema  Results Review:   I reviewed the patient's new clinical results.    Lab Results   Component Value Date    CALCIUM 8.6 09/07/2020    PHOS 4.7 (H) 09/07/2020     Results from last 7 days   Lab Units 09/07/20  0448 09/06/20  1906   MAGNESIUM mg/dL 2.5  --    SODIUM mmol/L 142 143   POTASSIUM mmol/L 4.8 5.7*   CHLORIDE mmol/L 102 101   CO2 mmol/L 25.0 25.0   BUN  47* 43*   CREATININE mg/dL 7.78* 7.27*   GLUCOSE mg/dL 90 100*   CALCIUM mg/dL 8.6 8.8   WBC 10*3/mm3 5.80 8.00   HEMOGLOBIN g/dL 9.9* 10.6*   PLATELETS 10*3/mm3 134* 147   ALT (SGPT) U/L  --  <5   AST (SGOT) U/L  --  10     Lab Results   Component Value Date    CKTOTAL 52 09/06/2020    TROPONINI 0.027 07/12/2020    TROPONINT 0.035 (C) 09/07/2020     Estimated Creatinine Clearance: 9.5 mL/min (A) (by C-G formula based on SCr of 7.78 mg/dL (H)).No results found for: URICACID    Brief Urine Lab Results  (Last result in the past 365 days)      Color   Clarity   Blood   Leuk Est   Nitrite   Protein   CREAT   Urine HCG        09/06/20 2101 Yellow Clear Moderate (2+) Large (3+) Negative >=300 mg/dL (3+)               Prior to Admission medications    Medication Sig Start Date End Date Taking? Authorizing Provider   amitriptyline (ELAVIL) 25 MG tablet Take 25 mg by mouth Every Night.   Yes ProviderHector MD   cloNIDine (CATAPRES) 0.2 MG tablet Take 0.2 mg by mouth 2 (Two) Times a Day.   Yes ProviderHector MD   escitalopram (LEXAPRO) 20 MG tablet Take 20 mg by mouth Daily.   Yes ProviderHector MD   gabapentin (NEURONTIN) 600 MG tablet Take 600 mg by mouth 3 (Three) Times a Day.   Yes Provider, MD Hector    HYDROcodone-acetaminophen (NORCO)  MG per tablet Take 1 tablet by mouth 3 (Three) Times a Day.   Yes Hector Iverson MD   lisinopril (PRINIVIL,ZESTRIL) 40 MG tablet Take 40 mg by mouth Every Night.   Yes Hector Iverson MD   pantoprazole (PROTONIX) 20 MG EC tablet Take 20 mg by mouth 2 (two) times a day.   Yes Hector Iverson MD   promethazine (PHENERGAN) 25 MG tablet Take 25 mg by mouth Every 8 (Eight) Hours As Needed for Nausea or Vomiting.   Yes Hector Iverson MD   rOPINIRole (REQUIP) 0.25 MG tablet Take 0.25 mg by mouth Every Night. Take 1 hour before bedtime.   Yes Hector Iverson MD   calcium acetate (PHOS BINDER,) 667 MG capsule capsule TK 2 CS PO AC 4/25/16 9/7/20  Hector Iverson MD   carvedilol (COREG) 12.5 MG tablet TK 1 T PO BID 6/24/16 9/7/20  Hector Iverson MD   cetirizine (ZyrTEC) 10 MG tablet TK 1 T PO  D 4/20/16 9/7/20  Hector Iverson MD   clonazePAM (KlonoPIN) 1 MG tablet TK 1 T PO  TID 6/30/16 9/7/20  Hector Iverson MD   cloNIDine (CATAPRES) 0.3 MG tablet TK 1 T PO TID 5/27/16 9/7/20  Hector Iverson MD   cyclobenzaprine (FLEXERIL) 5 MG tablet TK 1 T PO BID FOR 14 DAYS PRF MUSCLE SPASM 6/28/16 9/7/20  Hector Iverson MD   hydrALAZINE (APRESOLINE) 100 MG tablet TK 1 T PO TID 5/17/16 9/7/20  Hector Iverson MD   HYDROcodone-acetaminophen (NORCO) 7.5-325 MG per tablet TK 1 T PO  TID 6/30/16 9/7/20  Hector Iverson MD   lisinopril (PRINIVIL,ZESTRIL) 10 MG tablet TK 1 T PO  QHS 6/24/16 9/7/20  Hector Iverson MD   metoclopramide (REGLAN) 5 MG tablet TK 1 T PO BID 6/18/16 9/7/20  Hector Iverson MD   nicotine (NICODERM CQ) 21 MG/24HR patch USE 1 PATCH D 6/6/16 9/7/20  Provider, Historical, MD   pantoprazole (PROTONIX) 20 MG EC tablet TK 1 T PO Q 12 H 5/18/16 9/7/20  Provider, MD Hector   PARoxetine (PAXIL) 10 MG tablet TK 1 T PO D 5/24/16 9/7/20  Provider, MD Hector   promethazine (PHENERGAN)  25 MG tablet TK 1 T PO BID PRN FOR N 5/25/16 9/7/20  Provider, MD Hector   SENSIPAR 30 MG tablet TK 1 T PO QD 6/28/16 9/7/20  Provider, Hector, MD         amitriptyline 25 mg Oral Nightly   ampicillin-sulbactam 1.5 g Intravenous Q12H   cloNIDine 0.2 mg Oral Q12H   escitalopram 20 mg Oral Daily   gabapentin 100 mg Oral TID   heparin (porcine) 5,000 Units Subcutaneous Q8H   HYDROcodone-acetaminophen 1 tablet Oral TID   insulin lispro 0-7 Units Subcutaneous Q6H   lacosamide (VIMPAT) IVPB 100 mg Intravenous Q12H   levETIRAcetam 500 mg Intravenous Q12H   [START ON 9/8/2020] pantoprazole 40 mg Oral Q AM   rOPINIRole 0.25 mg Oral Nightly   sodium chloride 10 mL Intravenous Q12H       niCARdipine 5-15 mg/hr Last Rate: 2.5 mg/hr (09/07/20 0754)       Assessment/Plan       1.  ESRD.  I will provide hemodialysis today and continue per Monday Wednesday Friday schedule  Electrolytes, volume status okay    2.  Hypertension with ESRD  Blood pressure decreased significantly.  On Cardene drip for blood pressure control.  Patient not able to take oral medicines right now  I would recommend to decrease Cardene drip    3.  Anemia with ESRD  Epogen with dialysis    I discussed the patients findings and my recommendations with patient and nursing staff    Juanjose Tello MD  09/07/20  10:24

## 2020-09-08 LAB
ALBUMIN SERPL-MCNC: 3.7 G/DL (ref 3.5–5.2)
ALBUMIN/GLOB SERPL: 1.2 G/DL
ALP SERPL-CCNC: 345 U/L (ref 39–117)
ALT SERPL W P-5'-P-CCNC: <5 U/L (ref 1–33)
ANION GAP SERPL CALCULATED.3IONS-SCNC: 23 MMOL/L (ref 5–15)
APTT PPP: 27.7 SECONDS (ref 24–31)
AST SERPL-CCNC: 9 U/L (ref 1–32)
BASOPHILS # BLD AUTO: 0 10*3/MM3 (ref 0–0.2)
BASOPHILS NFR BLD AUTO: 0.7 % (ref 0–1.5)
BILIRUB SERPL-MCNC: 0.5 MG/DL (ref 0–1.2)
BUN SERPL-MCNC: 67 MG/DL (ref 6–20)
BUN SERPL-MCNC: ABNORMAL MG/DL
BUN/CREAT SERPL: ABNORMAL
CALCIUM SPEC-SCNC: 9.3 MG/DL (ref 8.6–10.5)
CHLORIDE SERPL-SCNC: 101 MMOL/L (ref 98–107)
CO2 SERPL-SCNC: 18 MMOL/L (ref 22–29)
CREAT SERPL-MCNC: 9.32 MG/DL (ref 0.57–1)
DEPRECATED RDW RBC AUTO: 61.3 FL (ref 37–54)
EOSINOPHIL # BLD AUTO: 0.1 10*3/MM3 (ref 0–0.4)
EOSINOPHIL NFR BLD AUTO: 1.6 % (ref 0.3–6.2)
ERYTHROCYTE [DISTWIDTH] IN BLOOD BY AUTOMATED COUNT: 18.2 % (ref 12.3–15.4)
GFR SERPL CREATININE-BSD FRML MDRD: 5 ML/MIN/1.73
GFR SERPL CREATININE-BSD FRML MDRD: ABNORMAL ML/MIN/{1.73_M2}
GLOBULIN UR ELPH-MCNC: 3.2 GM/DL
GLUCOSE BLDC GLUCOMTR-MCNC: 166 MG/DL (ref 70–105)
GLUCOSE BLDC GLUCOMTR-MCNC: 67 MG/DL (ref 70–105)
GLUCOSE BLDC GLUCOMTR-MCNC: 68 MG/DL (ref 70–105)
GLUCOSE BLDC GLUCOMTR-MCNC: 74 MG/DL (ref 70–105)
GLUCOSE BLDC GLUCOMTR-MCNC: 76 MG/DL (ref 70–105)
GLUCOSE SERPL-MCNC: 73 MG/DL (ref 65–99)
HCT VFR BLD AUTO: 33 % (ref 34–46.6)
HGB BLD-MCNC: 10.5 G/DL (ref 12–15.9)
INR PPP: 1.01 (ref 0.93–1.1)
LYMPHOCYTES # BLD AUTO: 0.7 10*3/MM3 (ref 0.7–3.1)
LYMPHOCYTES NFR BLD AUTO: 12.8 % (ref 19.6–45.3)
MAGNESIUM SERPL-MCNC: 2.6 MG/DL (ref 1.6–2.6)
MCH RBC QN AUTO: 30.4 PG (ref 26.6–33)
MCHC RBC AUTO-ENTMCNC: 31.9 G/DL (ref 31.5–35.7)
MCV RBC AUTO: 95 FL (ref 79–97)
MONOCYTES # BLD AUTO: 0.4 10*3/MM3 (ref 0.1–0.9)
MONOCYTES NFR BLD AUTO: 6.3 % (ref 5–12)
NEUTROPHILS NFR BLD AUTO: 4.6 10*3/MM3 (ref 1.7–7)
NEUTROPHILS NFR BLD AUTO: 78.6 % (ref 42.7–76)
NRBC BLD AUTO-RTO: 0 /100 WBC (ref 0–0.2)
PHOSPHATE SERPL-MCNC: 5.7 MG/DL (ref 2.5–4.5)
PLATELET # BLD AUTO: 156 10*3/MM3 (ref 140–450)
PMV BLD AUTO: 8.9 FL (ref 6–12)
POTASSIUM SERPL-SCNC: 6.1 MMOL/L (ref 3.5–5.2)
POTASSIUM SERPL-SCNC: 6.3 MMOL/L (ref 3.5–5.2)
PROT SERPL-MCNC: 6.9 G/DL (ref 6–8.5)
PROTHROMBIN TIME: 11 SECONDS (ref 9.6–11.7)
RBC # BLD AUTO: 3.47 10*6/MM3 (ref 3.77–5.28)
SODIUM SERPL-SCNC: 142 MMOL/L (ref 136–145)
WBC # BLD AUTO: 5.8 10*3/MM3 (ref 3.4–10.8)

## 2020-09-08 PROCEDURE — 25010000002 LEVETIRACETAM IN NACL 0.82% 500 MG/100ML SOLUTION: Performed by: NURSE PRACTITIONER

## 2020-09-08 PROCEDURE — 5A1D70Z PERFORMANCE OF URINARY FILTRATION, INTERMITTENT, LESS THAN 6 HOURS PER DAY: ICD-10-PCS | Performed by: INTERNAL MEDICINE

## 2020-09-08 PROCEDURE — 84132 ASSAY OF SERUM POTASSIUM: CPT | Performed by: INTERNAL MEDICINE

## 2020-09-08 PROCEDURE — 25010000002 EPOETIN ALFA-EPBX 2000 UNIT/ML SOLUTION 1 ML VIAL: Performed by: INTERNAL MEDICINE

## 2020-09-08 PROCEDURE — 85610 PROTHROMBIN TIME: CPT | Performed by: INTERNAL MEDICINE

## 2020-09-08 PROCEDURE — 83735 ASSAY OF MAGNESIUM: CPT | Performed by: NURSE PRACTITIONER

## 2020-09-08 PROCEDURE — 84100 ASSAY OF PHOSPHORUS: CPT | Performed by: NURSE PRACTITIONER

## 2020-09-08 PROCEDURE — 25010000002 HEPARIN (PORCINE) PER 1000 UNITS: Performed by: NURSE PRACTITIONER

## 2020-09-08 PROCEDURE — 80053 COMPREHEN METABOLIC PANEL: CPT | Performed by: INTERNAL MEDICINE

## 2020-09-08 PROCEDURE — 82962 GLUCOSE BLOOD TEST: CPT

## 2020-09-08 PROCEDURE — 85025 COMPLETE CBC W/AUTO DIFF WBC: CPT | Performed by: NURSE PRACTITIONER

## 2020-09-08 PROCEDURE — 85730 THROMBOPLASTIN TIME PARTIAL: CPT | Performed by: INTERNAL MEDICINE

## 2020-09-08 PROCEDURE — 25010000003 AMPICILLIN-SULBACTAM PER 1.5 G: Performed by: INTERNAL MEDICINE

## 2020-09-08 RX ORDER — DEXTROSE MONOHYDRATE 100 MG/ML
35 INJECTION, SOLUTION INTRAVENOUS CONTINUOUS
Status: DISCONTINUED | OUTPATIENT
Start: 2020-09-08 | End: 2020-09-13

## 2020-09-08 RX ADMIN — EPOETIN ALFA-EPBX 5000 UNITS: 2000 INJECTION, SOLUTION INTRAVENOUS; SUBCUTANEOUS at 22:01

## 2020-09-08 RX ADMIN — HEPARIN SODIUM 5000 UNITS: 5000 INJECTION INTRAVENOUS; SUBCUTANEOUS at 15:22

## 2020-09-08 RX ADMIN — HEPARIN SODIUM 5000 UNITS: 5000 INJECTION INTRAVENOUS; SUBCUTANEOUS at 06:46

## 2020-09-08 RX ADMIN — DEXTROSE MONOHYDRATE 20 ML/HR: 10 INJECTION, SOLUTION INTRAVENOUS at 18:15

## 2020-09-08 RX ADMIN — LEVETIRACETAM 500 MG: 5 INJECTION INTRAVENOUS at 08:35

## 2020-09-08 RX ADMIN — SODIUM CHLORIDE 2.5 MG/HR: 9 INJECTION, SOLUTION INTRAVENOUS at 14:32

## 2020-09-08 RX ADMIN — HEPARIN SODIUM 5000 UNITS: 5000 INJECTION INTRAVENOUS; SUBCUTANEOUS at 21:47

## 2020-09-08 RX ADMIN — LEVETIRACETAM 500 MG: 5 INJECTION INTRAVENOUS at 21:47

## 2020-09-08 RX ADMIN — Medication 10 ML: at 08:43

## 2020-09-08 RX ADMIN — Medication 10 ML: at 21:47

## 2020-09-08 RX ADMIN — DEXTROSE MONOHYDRATE 25 G: 25 INJECTION, SOLUTION INTRAVENOUS at 11:51

## 2020-09-08 RX ADMIN — AMPICILLIN SODIUM AND SULBACTAM SODIUM 1.5 G: 1; .5 INJECTION, POWDER, FOR SOLUTION INTRAMUSCULAR; INTRAVENOUS at 08:29

## 2020-09-08 RX ADMIN — SODIUM CHLORIDE 100 MG: 900 INJECTION, SOLUTION INTRAVENOUS at 21:48

## 2020-09-08 RX ADMIN — SODIUM CHLORIDE 100 MG: 900 INJECTION, SOLUTION INTRAVENOUS at 08:35

## 2020-09-08 RX ADMIN — DEXTROSE MONOHYDRATE 25 G: 25 INJECTION, SOLUTION INTRAVENOUS at 17:16

## 2020-09-08 RX ADMIN — AMPICILLIN SODIUM AND SULBACTAM SODIUM 1.5 G: 1; .5 INJECTION, POWDER, FOR SOLUTION INTRAMUSCULAR; INTRAVENOUS at 21:47

## 2020-09-08 NOTE — PLAN OF CARE
Patient remains on cardene gtt. She will follow commands but is very lethargic. She is starting Stat HD ordered by Dr Tello. She was started on 10% Dextrose at 20/hr because she required D50 coverage x2 due to blood glucose levels in the 60s. No further seizure activity seen. Vitals remain stable. Will continue to monitor.

## 2020-09-08 NOTE — PROGRESS NOTES
PULMONARY/ CRITICAL CARE PROGRESS NOTE        Patient Name:  Maura Garibay    :  1975    Medical Record:  [id number]    PRIMARY CARE PHYSICIAN     Danial Landeros MD HOPI  Maura Garibay is a 45 y.o. female with a PMH significant for ESRD on HD MWF, seizures, hypertension, GERD, hyperparathyroidism, IBS, anemia, hyperphosphatemia, HFpEF, depression, TTP, anxiety/depression and tobacco abuse who presented to the ER after being found by her neighbor shaking with seizure like activity.  EMS was called and when they arrived she was some jerking of her extremities.  Upon arrival to the ER she was evaluated and had seizure like activity and was given two doses of Ativan and a gram of Keppra.  Currently patient is unable to provide details regarding her medical history due to being obtunded.        :  No new issues.  No seizures since admitted.  Awakens with voice, but not oriented.  :  No issues overnight.  No seizure activity seen.  Awakens to voice and follows commands today.  On Cardene gtt     REVIEW OF SYSTEMS    Difficult to obtain, she is not speaking     HOME MEDICATIONS  Amitriptyline (ELAVIL) 25 MG tablet qhs    Aspirin 81 MG qday    AURYXIA 1  MG(Fe) tablet two tablets with meals    calcitriol (ROCALTROL) 0.25 MCG capsule 1 capsule by mouth 3 (three) times a week     cinacalcet (SENSIPAR) 30 MG tablet  by mouth 3 (three) times a week    cloNIDine (CATAPRES) 0.2 mg by mouth 3 (three) times daily     dilTIAZem ER beads (TIAZAC) 240 MG 24 hr capsule QD    diphenhydrAMINE (BENADRYL) 50 MG   every 4 (four) hours as needed for Itching    docusate sodium (COLACE) 100 MG capsule QD    escitalopram (LEXAPRO) 10 MG tablet QD    esomeprazole (NEXIUM) 40 MG QD    gabapentin (NEURONTIN) 600 MG tablet mg 4 (four) times daily    hydrALAZINE (APRESOLINE) 100 MG tablet BID    lacosamide (VIMPAT) 100 mg BID    levETIRAcetam 500 mg BID    linaclotide (LINZESS) 290 MCG QD    metoclopramide 5 MG one  tablet four times daily before meals and nightly    promethazine 25 mg by mouth every 6 hours as needed for nausea    Requip 0.25 MG tablet nightly    TRAZODONE HCL PO 50 mg nightly    MEDICAL HISTORY    Past Medical History:   Diagnosis Date   • (HFpEF) heart failure with preserved ejection fraction (CMS/HCC)    • Acid reflux    • Anxiety and depression    • Cardiomyopathy (CMS/HCC)    • Chronic pain    • Dependence on renal dialysis (CMS/HCC)    • Hyperparathyroidism (CMS/HCC)    • Hypertension    • IBS (irritable bowel syndrome)    • Migraines    • Neuropathy    • Pulmonary emboli (CMS/HCC)    • Renal failure    • Seizures (CMS/HCC)    • TTP (thrombotic thrombocytopenic purpura) (CMS/HCC)         SURGICAL HISTORY    Past Surgical History:   Procedure Laterality Date   • APPENDECTOMY     • ARTERIOVENOUS FISTULA     • BRAIN SURGERY      Fistula placed   • BRAIN SURGERY      Cyst   • CHOLECYSTECTOMY     • COLONOSCOPY     • HYSTERECTOMY     • SALPINGO OOPHORECTOMY     • TONSILLECTOMY          FAMILY HISTORY    Family History   Problem Relation Age of Onset   • Kidney disease Mother    • Hypertension Mother    • Heart disease Father    • Heart disease Sister    • Heart disease Brother         SOCIAL HISTORY    Social History     Socioeconomic History   • Marital status:      Spouse name: Not on file   • Number of children: Not on file   • Years of education: Not on file   • Highest education level: Not on file   Tobacco Use   • Smoking status: Current Every Day Smoker     Packs/day: 0.50   Substance and Sexual Activity   • Alcohol use: No   • Drug use: No        ALLERGIES    Allergies   Allergen Reactions   • Butorphanol Hives   • Codeine    • Contrast Dye      IV CONTRAST   • Golytely [Peg 3350-Electrolytes] Unknown - Low Severity   • Haldol [Haloperidol] Hives   • Morphine And Related    • Sulfa Antibiotics    • Toradol [Ketorolac Tromethamine]    • Vancomycin Hives         PHYSICAL EXAM    tMax 24 hrs:   Temp (24hrs), Av.1 °F (36.7 °C), Min:97.6 °F (36.4 °C), Max:98.7 °F (37.1 °C)    Vitals Ranges:  Temp:  [97.6 °F (36.4 °C)-98.7 °F (37.1 °C)] 98.7 °F (37.1 °C)  Heart Rate:  [91-99] 94  BP: (134-170)/() 155/97  Intake and Output Last 3 Shifts:  I/O last 3 completed shifts:  In: 1419 [I.V.:1419]  Out: -     Constitutional:  NAD  HEENT:   Atraumatic, PERRL, conjunctiva normal, moist oral mucosa, no nasal discharge.  Trachea is midline.  Respiratory:   No respiratory distress, normal breath sounds, no rales, no wheezing   Cardiovascular:  Tachy. Pulses 2+ and equal in all four extremities.   HARSH fistula with positive thrill and bruit.    GI:   Soft, nondistended, positive bowel sounds.  Extremities:   No edema, cyanosis or tenderness.  Integument:   No rashes.   Neurologic:  Awakens, follows commands.  Non-verbal.     LABS    Lab Results (last 24 hours)     Procedure Component Value Units Date/Time    BUN [[id number]]  (Abnormal) Collected:  20    Specimen:  Blood Updated:  20 0754     BUN 67 mg/dL     Comprehensive Metabolic Panel [[id number]]  (Abnormal) Collected:  20    Specimen:  Blood Updated:  20 0753     Glucose 73 mg/dL      BUN --     Comment: Testing performed by alternate method        Creatinine 9.32 mg/dL      Sodium 142 mmol/L      Potassium 6.3 mmol/L      Chloride 101 mmol/L      CO2 18.0 mmol/L      Calcium 9.3 mg/dL      Total Protein 6.9 g/dL      Albumin 3.70 g/dL      ALT (SGPT) <5 U/L      AST (SGOT) 9 U/L      Alkaline Phosphatase 345 U/L      Total Bilirubin 0.5 mg/dL      eGFR Non African Amer 5 mL/min/1.73      Comment: <15 Indicative of kidney failure.        eGFR   Amer --     Comment: <15 Indicative of kidney failure.        Globulin 3.2 gm/dL      A/G Ratio 1.2 g/dL      BUN/Creatinine Ratio --     Comment: Testing not performed        Anion Gap 23.0 mmol/L     Narrative:       GFR Normal >60  Chronic Kidney Disease <60  Kidney Failure  <15      Magnesium [[id number]]  (Normal) Collected:  09/08/20 0644    Specimen:  Blood Updated:  09/08/20 0719     Magnesium 2.6 mg/dL     Phosphorus [[id number]]  (Abnormal) Collected:  09/08/20 0644    Specimen:  Blood Updated:  09/08/20 0719     Phosphorus 5.7 mg/dL     Protime-INR [[id number]]  (Normal) Collected:  09/08/20 0644    Specimen:  Blood Updated:  09/08/20 0700     Protime 11.0 Seconds      INR 1.01    aPTT [[id number]]  (Normal) Collected:  09/08/20 0644    Specimen:  Blood Updated:  09/08/20 0700     PTT 27.7 seconds     CBC & Differential [[id number]] Collected:  09/08/20 0644    Specimen:  Blood Updated:  09/08/20 0650    Narrative:       The following orders were created for panel order CBC & Differential.  Procedure                               Abnormality         Status                     ---------                               -----------         ------                     CBC Auto Differential[[id number]]        Abnormal            Final result                 Please view results for these tests on the individual orders.    CBC Auto Differential [[id number]]  (Abnormal) Collected:  09/08/20 0644    Specimen:  Blood Updated:  09/08/20 0650     WBC 5.80 10*3/mm3      RBC 3.47 10*6/mm3      Hemoglobin 10.5 g/dL      Hematocrit 33.0 %      MCV 95.0 fL      MCH 30.4 pg      MCHC 31.9 g/dL      RDW 18.2 %      RDW-SD 61.3 fl      MPV 8.9 fL      Platelets 156 10*3/mm3      Neutrophil % 78.6 %      Lymphocyte % 12.8 %      Monocyte % 6.3 %      Eosinophil % 1.6 %      Basophil % 0.7 %      Neutrophils, Absolute 4.60 10*3/mm3      Lymphocytes, Absolute 0.70 10*3/mm3      Monocytes, Absolute 0.40 10*3/mm3      Eosinophils, Absolute 0.10 10*3/mm3      Basophils, Absolute 0.00 10*3/mm3      nRBC 0.0 /100 WBC     POC Glucose Once [[id number]]  (Normal) Collected:  09/08/20 0642    Specimen:  Blood Updated:  09/08/20 0644     Glucose 74 mg/dL      Comment: Serial Number: [id number]:  [id number]        Blood Culture - Blood, Blood, Central Line [[id number]] Collected:  09/07/20 0448    Specimen:  Blood, Central Line Updated:  09/08/20 0500     Blood Culture No growth at 24 hours    Blood Culture - Blood, Hand, Left [[id number]] Collected:  09/06/20 2349    Specimen:  Blood from Hand, Left Updated:  09/08/20 0045     Blood Culture No growth at 24 hours    Blood Culture - Blood, Blood, Central Line [[id number]] Collected:  09/06/20 2351    Specimen:  Blood, Central Line Updated:  09/08/20 0015     Blood Culture No growth at 24 hours    POC Glucose Once [[id number]]  (Normal) Collected:  09/07/20 2316    Specimen:  Blood Updated:  09/07/20 2317     Glucose 72 mg/dL      Comment: Serial Number: [id number]:  [id number]       Vitamin B12 [[id number]]  (Normal) Collected:  09/06/20 1906    Specimen:  Blood from Hand, Right Updated:  09/07/20 1804     Vitamin B-12 830 pg/mL     Narrative:       Results may be falsely increased if patient taking Biotin.      POC Glucose Once [[id number]]  (Normal) Collected:  09/07/20 1738    Specimen:  Blood Updated:  09/07/20 1745     Glucose 76 mg/dL      Comment: Serial Number: [id number]:  [id number]       Hemoglobin A1c [[id number]]  (Normal) Collected:  09/07/20 0448    Specimen:  Blood Updated:  09/07/20 1514     Hemoglobin A1C 5.3 %     Narrative:       Hemoglobin A1C Reference Range:    <5.7 %        Normal  5.7-6.4 %     Increased risk for diabetes  > 6.4 %        Diabetes       These guidelines have been recommended by the American Diabetic Association for Hgb A1c.      The following 2010 guidelines have been recommended by the American Diabetes Association for Hemoglobin A1c.    HBA1c 5.7-6.4% Increased risk for future diabetes (pre-diabetes)  HBA1c     >6.4% Diabetes      TSH [[id number]]  (Normal) Collected:  09/07/20 0448    Specimen:  Blood Updated:  09/07/20 1254     TSH 0.562 uIU/mL     POC Glucose Once [[id number]]  (Normal) Collected:  09/07/20 1226    Specimen:   Blood Updated:  09/07/20 1227     Glucose 85 mg/dL      Comment: Serial Number: [id number]:  [id number]             MICRO:  Microbiology Results (last 10 days)     Procedure Component Value - Date/Time    Blood Culture - Blood, Blood, Central Line [[id number]] Collected:  09/07/20 0448    Lab Status:  Preliminary result Specimen:  Blood, Central Line Updated:  09/08/20 0500     Blood Culture No growth at 24 hours    Blood Culture - Blood, Blood, Central Line [[id number]] Collected:  09/06/20 2351    Lab Status:  Preliminary result Specimen:  Blood, Central Line Updated:  09/08/20 0015     Blood Culture No growth at 24 hours    MRSA Screen, PCR (Inpatient) - Swab, Nares [[id number]]  (Normal) Collected:  09/06/20 2350    Lab Status:  Final result Specimen:  Swab from Nares Updated:  09/07/20 0256     MRSA PCR No MRSA Detected    Blood Culture - Blood, Hand, Left [[id number]] Collected:  09/06/20 2349    Lab Status:  Preliminary result Specimen:  Blood from Hand, Left Updated:  09/08/20 0045     Blood Culture No growth at 24 hours    COVID-19,[facility] IN-HOUSE,Nasal Swab No Transport Media 3-4 HR TAT - Swab, Nasal Cavity [[id number]]  (Normal) Collected:  09/06/20 2148    Lab Status:  Final result Specimen:  Swab from Nasal Cavity Updated:  09/06/20 2226     COVID19 Not Detected    Narrative:       Fact sheet for providers: https://www.fda.gov/media/899494/download     Fact sheet for patients: https://www.fda.gov/media/754819/download          IMAGING & OTHER STUDIES    Imaging Results (Last 72 Hours)     Procedure Component Value Units Date/Time    XR Chest 1 View [[id number]] Collected:  09/07/20 1131     Updated:  09/07/20 1136    Narrative:       DATE OF EXAM:  9/7/2020 11:08 AM     PROCEDURE:  XR CHEST 1 VW-     INDICATIONS:  central line; R56.9-Unspecified convulsions; I16.1-Hypertensive  emergency; N18.6-End stage renal disease; Z99.2-Dependence on renal  dialysis     COMPARISON:  09/06/2020     TECHNIQUE:    Single radiographic view of the chest was obtained.     FINDINGS:  Right subclavian catheter appears to have been withdrawn with tip now  projecting at the cavoatrial junction.  Left IJ port catheter also now  appears to be in the area of the cavoatrial junction.  No pneumothorax  is identified. The heart is enlarged. No significant interval change in  diffuse interstitial and bibasilar opacities. No definite effusion is  seen. There is right convex scoliosis of the upper thoracic spine.       Impression:       1.Right subclavian catheter tip now projects at the cavoatrial junction.  Left IJ port catheter tip also appears to be an area of the cavoatrial  junction.  2.Stable cardiomegaly.  3.No significant interval change in diffuse interstitial and bibasilar  airspace opacities. No pneumothorax is seen.     Electronically Signed By-DR. Mani Leo MD On:9/7/2020 11:34 AM  This report was finalized on [id number] by DR. Mani Leo MD.    XR Chest 1 View [[id number]] Collected:  09/07/20 0800     Updated:  09/07/20 0804    Narrative:       DATE OF EXAM:  9/6/2020 8:23 PM     PROCEDURE:  XR CHEST 1 VW-     INDICATIONS:  Central line placement     COMPARISON:  No Comparisons Available     TECHNIQUE:   Single radiographic view of the chest was obtained.     FINDINGS:  There is a right subclavian catheter with tip projecting in the right  atrium. Catheter tip appears to be approximate 6 cm inferior to the area  of the cavoatrial junction on this exam. There is also a left IJ port  catheter with tip projecting in the superior right atrium. No  pneumothorax is seen.  The heart is enlarged.  There are diffuse  interstitial and bibasilar airspace opacities. No significant effusion  or pneumothorax is seen. There is right convex scoliosis of the upper  thoracic spine. Left upper arm vascular stent is incompletely  visualized.       Impression:       1.Right subclavian catheter projects in the right atrium. Catheter  could  be withdrawn approximately 6 cm to be in the area of the cavoatrial  junction on this exam.  2.Left IJ port catheter terminates in the superior right atrium.  3.No pneumothorax is seen.  4.Cardiomegaly.  5.Diffuse interstitial and bibasilar airspace opacities which could  indicate edema or pneumonia.     Electronically Signed By-DR. Mani Leo MD On:9/7/2020 8:02 AM  This report was finalized on [id number] by DR. Mani Leo MD.    CT Head Without Contrast [[id number]] Collected:  09/06/20 1821     Updated:  09/06/20 2030    Narrative:       EXAMINATION: CT HEAD WITHOUT CONTRAST    DATE: 9/6/2020 7:59 PM    INDICATION: Hypertension and seizure    COMPARISON: None available at the time of this dictation.    TECHNIQUE: Noncontrast imaging obtained from the vertex to the skull base.  CT dose lowering techniques were used, to include: automated exposure control, adjustment for patient size, and or use of iterative reconstruction.?    FINDINGS:    Soft Tissues: No significant soft tissue abnormality.    Skull: Right frontal craniotomy    Sinuses: Paranasal sinuses are clear.    Mastoids: Mastoid air cells are clear.     Globes and Orbits: Globes and orbits are intact.    Brain: No acute hemorrhage.  No midline shift, masses, or mass effect.  No evidence of acute infarct by noncontrast CT.    Ventricles and Cisterns: Ventricular size and configuration is within normal limits. Basal cisterns are patent. No abnormal extra-axial fluid collection.            Impression:           1. No acute intracranial abnormality.    2. Changes of prior right frontal craniotomy.      Electronically signed by:  Yohannes Guerrero M.D.    9/6/2020 6:29 PM            ASSESSMENT/PLAN  Seizure (CMS/[facility])  -Patient with history of same  -Keppra and Vimpat at outpatient  -?compliance  -Keppra 500 mg IV bid and Vimapt 100 mg IV BID and switch to oral when appropriate  -EEG if no improvement in mental status   -CK level  52    Hypertensive emergency  -/129 on admission  -Cardene gtt, cont.  Try to wean  -check with renal to see of ok to change Clonidine to Norvasc     Pneumonia  -? aspiration  -Given Cefepime and Clindamycin given h/o seizures. Now on Unasyn, cont  -COVID19 swab Negative  -RVP ordered  -Urine antigen for Strep and Legionella pending  -MRSA nasal swab Negative    Encephalopathy  -CT head negative for acute findings  -? postictal state vs hypertensive encephalopathy  -Utox, ASA pending  -APAP < 5    ESRD with HD dependence  -Will consult Dr. Tello = sees Dr. Rod Ch    Anemia  -Hemoglobin 10.6 on admission  -No signs of active bleeding  -EGD 07/14/20 at [place] without acute findings    Hyperparathyroidism  -Continue Sensipar and Calcitriol when appropriate    Hyperphosphatemia  -Continue phos binders when appropriate    Anxiety/depression  -Continue Lexapro and Trazodone when appropriate       PUD: Protonix  Insulin:   Sliding scale  VTE:   SCDs/  Lovenox  Nutrition:  NPO, speech therapy to see for diet eval    Possible transfer out of ICU later today

## 2020-09-08 NOTE — ED NOTES
EMS reports pt room mate called EMS after finding pt unresponsive after having a seizure at their home. Pt room mate reports this has happened in the past and pt had to have their seizure medications adjusted. EMS reports pt had one seizure once they arrived on scene and another in route. Pt arrived post-ictal to ED.     Ting Kwok RN  09/07/20 2588

## 2020-09-08 NOTE — PROGRESS NOTES
"   LOS: 2 days    Patient Care Team:  Danial Landeros MD as PCP - General (Family Medicine)  Dewey Jack MD as PCP - Claims Attributed      Subjective     Patient was seen and examined this morning.  Drowsy but arousable and follow commands but did not communicate with me this morning    Objective     Vital Sign Min/Max for last 24 hours  Temp:  [97.6 °F (36.4 °C)-98.7 °F (37.1 °C)] 97.7 °F (36.5 °C)  Heart Rate:  [93-98] 94  BP: (134-170)/() 155/97                       Flowsheet Rows      First Filed Value   Admission Height  170.2 cm (67\") Documented at 09/06/2020 1756   Admission Weight  81.6 kg (180 lb) Documented at 09/06/2020 1756          No intake/output data recorded.  I/O last 3 completed shifts:  In: 1419 [I.V.:1419]  Out: -     Physical Exam:  Physical Exam    General.  NAD.  Responds to verbal stimulus  Head.  Atraumatic, normocephalic  Neck.  Supple  Respiratory.  Clear to auscultation  Cardiovascular.  Regular rhythm  Abdominal.  Obese, soft, nontender  Extremities.  Trace edema       LABS:  Lab Results   Component Value Date    CALCIUM 9.3 09/08/2020    PHOS 5.7 (H) 09/08/2020     Results from last 7 days   Lab Units 09/08/20  1119 09/08/20  0644 09/07/20  0448 09/06/20  1906   MAGNESIUM mg/dL  --  2.6 2.5  --    SODIUM mmol/L  --  142 142 143   POTASSIUM mmol/L 6.1* 6.3* 4.8 5.7*   CHLORIDE mmol/L  --  101 102 101   CO2 mmol/L  --  18.0* 25.0 25.0   BUN   --  67* 47* 43*   CREATININE mg/dL  --  9.32* 7.78* 7.27*   GLUCOSE mg/dL  --  73 90 100*   CALCIUM mg/dL  --  9.3 8.6 8.8   WBC 10*3/mm3  --  5.80 5.80 8.00   HEMOGLOBIN g/dL  --  10.5* 9.9* 10.6*   PLATELETS 10*3/mm3  --  156 134* 147   ALT (SGPT) U/L  --  <5  --  <5   AST (SGOT) U/L  --  9  --  10     Lab Results   Component Value Date    CKTOTAL 52 09/06/2020    TROPONINI 0.027 07/12/2020    TROPONINT 0.035 (C) 09/07/2020     Estimated Creatinine Clearance: 8.1 mL/min (A) (by C-G formula based on SCr of 9.32 mg/dL (H)).      Brief " Urine Lab Results  (Last result in the past 365 days)      Color   Clarity   Blood   Leuk Est   Nitrite   Protein   CREAT   Urine HCG        09/06/20 2101 Yellow Clear Moderate (2+) Large (3+) Negative >=300 mg/dL (3+)             WEIGHTS:     Wt Readings from Last 1 Encounters:   09/08/20 0400 66.6 kg (146 lb 13.2 oz)   09/07/20 0400 65.5 kg (144 lb 6.4 oz)   09/06/20 2210 65.5 kg (144 lb 6.4 oz)   09/06/20 1756 81.6 kg (180 lb)         amitriptyline 25 mg Oral Nightly   ampicillin-sulbactam 1.5 g Intravenous Q12H   escitalopram 20 mg Oral Daily   gabapentin 100 mg Oral TID   heparin (porcine) 5,000 Units Subcutaneous Q8H   insulin lispro 0-7 Units Subcutaneous Q6H   lacosamide (VIMPAT) IVPB 100 mg Intravenous Q12H   levETIRAcetam 500 mg Intravenous Q12H   pantoprazole 40 mg Oral Q AM   rOPINIRole 0.25 mg Oral Nightly   sodium chloride 10 mL Intravenous Q12H       niCARdipine 5-15 mg/hr Last Rate: 2.5 mg/hr (09/07/20 1916)       Assessment/Plan     1.  ESRD  Volume status okay but hyperkalemia noticed  Will provide hemodialysis today    2.  Hypertension with ESRD  Still on Cardene drip.  Patient not able to take oral antihypertensives    3.  Anemia with ESRD  Epogen with dialysis    4.  Seizures  Neurology following    Juanjose Tello MD  09/08/20  12:44

## 2020-09-08 NOTE — PLAN OF CARE
Patient is on cardene gtt. Pt receiving stat HD per Dr Tello. Patient remains very lethargic but is able to follow commands. She is also nonverbal post-ictally--Spoke with the patient's daughter today at bedside and

## 2020-09-08 NOTE — ED NOTES
Charge RN attempted accucath access via ultrasound, unable to gain IV access.  MD notified, awaiting new orders.      Ting Kwok RN  09/07/20 0040

## 2020-09-08 NOTE — ED NOTES
Pt had another seizure in room, aprox 30 seconds long.  IV access was gained in right breast (22g), but unable to administer cardene through this line due to gauge size. MD notified, will continue to attempt to gain IV access. Yue Freed RN, Luis Welch RN, and Sophy Rudolph Phleb. RN's attempting to gain IV access, Phleb attempting to collect blood orders. Will update MD when IV acces is established.      Ting Kwok RN  09/07/20 2102       Ting Kwok RN  09/07/20 2105

## 2020-09-08 NOTE — PLAN OF CARE
Problem: Patient Care Overview  Goal: Plan of Care Review  Outcome: Ongoing (interventions implemented as appropriate)  Flowsheets (Taken 9/8/2020 0115)  Outcome Summary: Per RN, pt not appropriate for PT at this time, very lethargic. PT follow-up 9/9/20.

## 2020-09-08 NOTE — PROGRESS NOTES
Continued Stay Note  [facility]     Patient Name: Maura Garibay  MRN: [id number]  Today's Date: 9/8/2020    Admit Date: 9/6/2020    Discharge Plan     Row Name 09/08/20 1427       Plan    Plan  PT eval ordered. From home with friend. [facility] following, will need order if pt agreeable. Current OP HD [place], [place].     Plan Comments  Received a phone call from [facility] liasion that stated they received a referral from [facility] to start services and patient declined their services after discharge. [facility] can accept patient if patient is agreeable. Liasion stated they had patient discharging to an address in [place], [place] however facesheet shows a [place] address.        Nanda Sumner RN

## 2020-09-08 NOTE — PROGRESS NOTES
Discharge Planning Assessment  [facility]     Patient Name: Maura Garibay  MRN: [id number]  Today's Date: 9/8/2020    Admit Date: 9/6/2020    Discharge Needs Assessment     Row Name 09/08/20 1408       Living Environment    Lives With  friend(s)    Current Living Arrangements  home/apartment/condo    Primary Care Provided by  self    Provides Primary Care For  no one, unable/limited ability to care for self    Family Caregiver if Needed  child(muna), adult    Quality of Family Relationships  helpful    Able to Return to Prior Arrangements  yes       Resource/Environmental Concerns    Resource/Environmental Concerns  none    Transportation Concerns  car, none       Transition Planning    Patient/Family Anticipates Transition to  home with help/services    Patient/Family Anticipated Services at Transition  rehabilitation services    Transportation Anticipated  family or friend will provide       Discharge Needs Assessment    Readmission Within the Last 30 Days  no previous admission in last 30 days    Concerns to be Addressed  discharge planning    Equipment Currently Used at Home  none    Anticipated Changes Related to Illness  inability to care for self        Discharge Plan     Row Name 09/08/20 1410       Plan    Plan  PT eval ordered. From home with friend. Current OP HD [place], [place].     Patient/Family in Agreement with Plan  yes    Plan Comments  Met with patient and daughter at beside. Patient did not participate in conversation. Daughter reports patient is living with a friend currently. Daughter reports patient is normally I with ADLs and drives. States patient drives herself to dialysis. Stated patient was recently discharged from [facility] on August 26th and was there for a few weeks. [facility] patient was supposed to go to IP rehab but declined on discharge and went home with Genia . Notification sent to [employer]  liasion asking if patient was still current. Tyrone WATKINS following for  pending UDS and [facility]. DC barriers: IV Cardene            Home Medical Care      Service Provider Request Status Selected Services Address Phone Number Fax Number    [facility] Pending - Request Sent N/A [address] [place] [address] [phone]        Expected Discharge Date and Time     Expected Discharge Date Expected Discharge Time    Sep 11, 2020         Demographic Summary     Row Name 09/08/20 1407       General Information    Admission Type  inpatient    Arrived From  emergency department    Required Notices Provided  Important Message from Medicare    Referral Source  admission list    Reason for Consult  discharge planning    Preferred Language  English     Used During This Interaction  no        Functional Status     Row Name 09/08/20 1408       Functional Status    Usual Activity Tolerance  good    Current Activity Tolerance  poor       Functional Status, IADL    Medications  independent          Patient Forms     Row Name 09/08/20 1413       Patient Forms    Important Message from Medicare (IMM)  Delivered IM delivered 9/6 per juaquin Sumner RN

## 2020-09-08 NOTE — DISCHARGE PLACEMENT REQUEST
"Maura Garibay (45 y.o. Female)     Date of Birth Social Security Number Address Home Phone MRN    1975  [id number] [facility] [address] [phone] [id number]    [employer] Marital Status          None        Admission Date Admission Type Admitting Provider Attending Provider Department, Room/Bed    9/6/20 Emergency Mansi Plaza MD Teferra, Rahel A, MD [facility] INTENSIVE CARE UNIT, 2316/1    Discharge Date Discharge Disposition Discharge Destination                       Attending Provider:  Mansi Plaza MD    Allergies:  Butorphanol, Codeine, Contrast Dye, Golytely [Peg 3350-electrolytes], Haldol [Haloperidol], Morphine And Related, Sulfa Antibiotics, Toradol [Ketorolac Tromethamine], Vancomycin    Isolation:  None   Infection:  None   Code Status:  CPR    Ht:  170.2 cm (67\")   Wt:  66.6 kg (146 lb 13.2 oz)    Admission Cmt:  None   Principal Problem:  None                Active Insurance as of 9/6/2020     Primary Coverage     Payor Plan Insurance Group Employer/Plan Group    MEDICARE MEDICARE A & B      Payor Plan Address Payor Plan Phone Number Payor Plan Fax Number Effective Dates    [address] [phone]  3/1/2011 - None Entered    [facility] [address]       Subscriber Name Subscriber Birth Date Member ID       MAURA GARIBAY 1975 [id number]           Secondary Coverage     Payor Plan Insurance Group Employer/Plan Group    [facility] [id number]     Payor Plan Address Payor Plan Phone Number Payor Plan Fax Number Effective Dates    [address] [phone]  12/1/2010 - None Entered    [facility] [address]       Subscriber Name Subscriber Birth Date Member ID       MAURA GARIBAY 1975 [id number]                 Emergency Contacts      (Rel.) Home Phone Work Phone Mobile Phone    Anna Burks (Sister) [phone] -- --            "

## 2020-09-09 LAB
ALBUMIN SERPL-MCNC: 3.7 G/DL (ref 3.5–5.2)
ANION GAP SERPL CALCULATED.3IONS-SCNC: 17 MMOL/L (ref 5–15)
BASOPHILS # BLD AUTO: 0 10*3/MM3 (ref 0–0.2)
BASOPHILS NFR BLD AUTO: 0.8 % (ref 0–1.5)
BUN SERPL-MCNC: 17 MG/DL (ref 6–20)
BUN SERPL-MCNC: ABNORMAL MG/DL
BUN/CREAT SERPL: ABNORMAL
CALCIUM SPEC-SCNC: 9.3 MG/DL (ref 8.6–10.5)
CHLORIDE SERPL-SCNC: 95 MMOL/L (ref 98–107)
CO2 SERPL-SCNC: 27 MMOL/L (ref 22–29)
CREAT SERPL-MCNC: 4.55 MG/DL (ref 0.57–1)
DEPRECATED RDW RBC AUTO: 56.4 FL (ref 37–54)
EOSINOPHIL # BLD AUTO: 0.1 10*3/MM3 (ref 0–0.4)
EOSINOPHIL NFR BLD AUTO: 1 % (ref 0.3–6.2)
ERYTHROCYTE [DISTWIDTH] IN BLOOD BY AUTOMATED COUNT: 17.6 % (ref 12.3–15.4)
GFR SERPL CREATININE-BSD FRML MDRD: 10 ML/MIN/1.73
GFR SERPL CREATININE-BSD FRML MDRD: ABNORMAL ML/MIN/{1.73_M2}
GLUCOSE BLDC GLUCOMTR-MCNC: 101 MG/DL (ref 70–105)
GLUCOSE BLDC GLUCOMTR-MCNC: 101 MG/DL (ref 70–105)
GLUCOSE BLDC GLUCOMTR-MCNC: 97 MG/DL (ref 70–105)
GLUCOSE SERPL-MCNC: 96 MG/DL (ref 65–99)
HCT VFR BLD AUTO: 31.2 % (ref 34–46.6)
HGB BLD-MCNC: 10.1 G/DL (ref 12–15.9)
LYMPHOCYTES # BLD AUTO: 0.8 10*3/MM3 (ref 0.7–3.1)
LYMPHOCYTES NFR BLD AUTO: 14.3 % (ref 19.6–45.3)
MAGNESIUM SERPL-MCNC: 2.1 MG/DL (ref 1.6–2.6)
MCH RBC QN AUTO: 30.1 PG (ref 26.6–33)
MCHC RBC AUTO-ENTMCNC: 32.4 G/DL (ref 31.5–35.7)
MCV RBC AUTO: 92.9 FL (ref 79–97)
MONOCYTES # BLD AUTO: 0.4 10*3/MM3 (ref 0.1–0.9)
MONOCYTES NFR BLD AUTO: 7.6 % (ref 5–12)
NEUTROPHILS NFR BLD AUTO: 4.3 10*3/MM3 (ref 1.7–7)
NEUTROPHILS NFR BLD AUTO: 76.3 % (ref 42.7–76)
NRBC BLD AUTO-RTO: 0 /100 WBC (ref 0–0.2)
PHOSPHATE SERPL-MCNC: 4.3 MG/DL (ref 2.5–4.5)
PLATELET # BLD AUTO: 157 10*3/MM3 (ref 140–450)
PMV BLD AUTO: 8.2 FL (ref 6–12)
POTASSIUM SERPL-SCNC: 3.9 MMOL/L (ref 3.5–5.2)
RBC # BLD AUTO: 3.36 10*6/MM3 (ref 3.77–5.28)
SODIUM SERPL-SCNC: 139 MMOL/L (ref 136–145)
WBC # BLD AUTO: 5.6 10*3/MM3 (ref 3.4–10.8)

## 2020-09-09 PROCEDURE — 85025 COMPLETE CBC W/AUTO DIFF WBC: CPT | Performed by: NURSE PRACTITIONER

## 2020-09-09 PROCEDURE — 63710000001 ONDANSETRON PER 8 MG: Performed by: NURSE PRACTITIONER

## 2020-09-09 PROCEDURE — 99233 SBSQ HOSP IP/OBS HIGH 50: CPT | Performed by: NURSE PRACTITIONER

## 2020-09-09 PROCEDURE — 80069 RENAL FUNCTION PANEL: CPT | Performed by: INTERNAL MEDICINE

## 2020-09-09 PROCEDURE — 25010000002 LEVETIRACETAM IN NACL 0.82% 500 MG/100ML SOLUTION: Performed by: NURSE PRACTITIONER

## 2020-09-09 PROCEDURE — 82962 GLUCOSE BLOOD TEST: CPT

## 2020-09-09 PROCEDURE — 92610 EVALUATE SWALLOWING FUNCTION: CPT

## 2020-09-09 PROCEDURE — 25010000003 AMPICILLIN-SULBACTAM PER 1.5 G: Performed by: INTERNAL MEDICINE

## 2020-09-09 PROCEDURE — 83735 ASSAY OF MAGNESIUM: CPT | Performed by: NURSE PRACTITIONER

## 2020-09-09 PROCEDURE — 97163 PT EVAL HIGH COMPLEX 45 MIN: CPT

## 2020-09-09 PROCEDURE — 25010000002 HEPARIN (PORCINE) PER 1000 UNITS: Performed by: NURSE PRACTITIONER

## 2020-09-09 RX ORDER — GABAPENTIN 100 MG/1
200 CAPSULE ORAL 3 TIMES DAILY
Status: DISCONTINUED | OUTPATIENT
Start: 2020-09-09 | End: 2020-09-13

## 2020-09-09 RX ADMIN — SODIUM CHLORIDE 100 MG: 900 INJECTION, SOLUTION INTRAVENOUS at 20:28

## 2020-09-09 RX ADMIN — SODIUM CHLORIDE 100 MG: 900 INJECTION, SOLUTION INTRAVENOUS at 09:49

## 2020-09-09 RX ADMIN — LEVETIRACETAM 500 MG: 5 INJECTION INTRAVENOUS at 19:43

## 2020-09-09 RX ADMIN — SODIUM CHLORIDE 2.5 MG/HR: 9 INJECTION, SOLUTION INTRAVENOUS at 20:27

## 2020-09-09 RX ADMIN — HYDROCODONE BITARTRATE AND ACETAMINOPHEN 1 TABLET: 10; 325 TABLET ORAL at 12:22

## 2020-09-09 RX ADMIN — SODIUM CHLORIDE 10 MG/HR: 9 INJECTION, SOLUTION INTRAVENOUS at 23:01

## 2020-09-09 RX ADMIN — AMPICILLIN SODIUM AND SULBACTAM SODIUM 1.5 G: 1; .5 INJECTION, POWDER, FOR SOLUTION INTRAMUSCULAR; INTRAVENOUS at 08:04

## 2020-09-09 RX ADMIN — HEPARIN SODIUM 5000 UNITS: 5000 INJECTION INTRAVENOUS; SUBCUTANEOUS at 05:35

## 2020-09-09 RX ADMIN — SODIUM CHLORIDE 2.5 MG/HR: 9 INJECTION, SOLUTION INTRAVENOUS at 03:20

## 2020-09-09 RX ADMIN — ONDANSETRON HYDROCHLORIDE 4 MG: 4 TABLET, FILM COATED ORAL at 14:24

## 2020-09-09 RX ADMIN — Medication 10 ML: at 09:49

## 2020-09-09 RX ADMIN — HEPARIN SODIUM 5000 UNITS: 5000 INJECTION INTRAVENOUS; SUBCUTANEOUS at 15:11

## 2020-09-09 RX ADMIN — Medication 10 ML: at 20:28

## 2020-09-09 RX ADMIN — AMPICILLIN SODIUM AND SULBACTAM SODIUM 1.5 G: 1; .5 INJECTION, POWDER, FOR SOLUTION INTRAMUSCULAR; INTRAVENOUS at 21:19

## 2020-09-09 RX ADMIN — LEVETIRACETAM 500 MG: 5 INJECTION INTRAVENOUS at 08:04

## 2020-09-09 NOTE — PLAN OF CARE
Problem: Patient Care Overview  Goal: Plan of Care Review  Outcome: Ongoing (interventions implemented as appropriate)  Flowsheets (Taken 9/9/2020 1156)  Progress: improving  Plan of Care Reviewed With: patient  Outcome Summary: Clinical swallow evaluation completed on this date.  Patient given trials of water by straw, applesauce, peaches and cracker.  Patient exibits slow but functional mastication for solids given edentulous status.  No oral residue evident.  Timely swallow initiation for all consistencies.  No overt s/s of aspiration across all trials.  Recommend a soft to chew and thin liquid diet.

## 2020-09-09 NOTE — THERAPY EVALUATION
"Acute Care - Speech Language Pathology   Swallow Initial Evaluation  Bob     Patient Name: Maura Garibay  : 1975  MRN: [id number]  Today's Date: 2020               Admit Date: 2020    Visit Dx:     ICD-10-CM ICD-9-CM   1. Seizure (CMS/HCC) R56.9 780.39   2. Hypertensive emergency I16.1 401.9   3. Chronic kidney disease with end stage renal failure on dialysis (CMS/HCC) N18.6 585.6    Z99.2 V45.11     Patient Active Problem List   Diagnosis   • Seizure (CMS/HCC)     Past Medical History:   Diagnosis Date   • (HFpEF) heart failure with preserved ejection fraction (CMS/HCC)    • Acid reflux    • Anxiety and depression    • Cardiomyopathy (CMS/HCC)    • Chronic pain    • Dependence on renal dialysis (CMS/HCC)    • Hyperparathyroidism (CMS/HCC)    • Hypertension    • IBS (irritable bowel syndrome)    • Migraines    • Neuropathy    • Pulmonary emboli (CMS/HCC)    • Renal failure    • Seizures (CMS/HCC)    • TTP (thrombotic thrombocytopenic purpura) (CMS/HCC)      Past Surgical History:   Procedure Laterality Date   • APPENDECTOMY     • ARTERIOVENOUS FISTULA     • BRAIN SURGERY      Fistula placed   • BRAIN SURGERY      Cyst   • CHOLECYSTECTOMY     • COLONOSCOPY     • HYSTERECTOMY     • SALPINGO OOPHORECTOMY     • TONSILLECTOMY          SWALLOW EVALUATION (last 72 hours)      [facility] Adult Swallow Evaluation     Row Name 20 1100       Rehab Evaluation    Document Type  evaluation  -MM    Subjective Information  no complaints  -MM    Patient Observations  alert  -MM    Patient/Family Observations  Patient initially did not want to partake in evaluation but then agreeable.  At one point patient stated \"you aren't trying to kill me are you\".  ST re-assurred patient that that was not the case.  Patient refused to keep on her nasal cannula stating \"it has medicine in it\".    -MM    Patient Effort  adequate  -MM    Comment  PPE: gloves, mask, goggles  -MM       General Information    Patient Profile " Reviewed  yes  -MM    Pertinent History Of Current Problem  Maura Garibay is a 45 y.o. female with a PMH significant for ESRD on HD MWF, seizures, hypertension, GERD, hyperparathyroidism, IBS, anemia, hyperphosphatemia, HFpEF, depression, TTP, anxiety/depression and tobacco abuse who presented to the ER after being found by her neighbor shaking with seizure like activity.  EMS was called and when they arrived she was some jerking of her extremities.  Upon arrival to the ER she was evaluated and had seizure like activity and was given two doses of Ativan and a gram of Keppra  -MM    Current Method of Nutrition  NPO  -MM    Prior Level of Function-Swallowing  no diet consistency restrictions  -MM    Plans/Goals Discussed with  patient  -MM    Barriers to Rehab  none identified  -MM       Oral Motor and Function    Dentition Assessment  edentulous, dentures not available  -MM    Secretion Management  WNL/WFL  -MM    Volitional Swallow  WFL  -MM    Volitional Cough  WFL  -MM       Oral Musculature and Cranial Nerve Assessment    Oral Motor General Assessment  WFL  -MM    Oral Motor, Comment  Patient reports she normally wears dentures but they are not present.  She reports she can chew without them in  -MM       General Eating/Swallowing Observations    Respiratory Support Currently in Use  nasal cannula however refused to keep it on  -MM    Eating/Swallowing Skills  self-fed;fed by SLP  -MM    Positioning During Eating  upright 90 degree;upright in bed  -MM    Utensils Used  spoon;straw  -MM    Consistencies Trialed  regular textures;chopped;pureed;thin liquids  -MM       Respiratory    Respiratory Status  WFL  -MM       Clinical Swallow Eval    Oral Prep Phase  WFL  -MM    Oral Transit  WFL  -MM    Oral Residue  WFL  -MM    Pharyngeal Phase  no overt signs/symptoms of pharyngeal impairment  -MM    Clinical Swallow Evaluation Summary  Patient given trials of water by straw, applesauce, peaches and cracker.  Patient  exibits slow but functional mastication for solids given edentulous status.  No oral residue evident.  Timely swallow initiation for all consistencies.  No overt s/s of aspiration across all trials.  -MM       Clinical Impression    SLP Swallowing Diagnosis  functional oral phase;functional pharyngeal phase  -MM    Functional Impact  no impact on function  -MM    Rehab Potential/Prognosis, Swallowing  good, to achieve stated therapy goals  -MM    Swallow Criteria for Skilled Therapeutic Interventions Met  demonstrates skilled criteria  -MM       Recommendations    Therapy Frequency (Swallow)  PRN  -MM    Predicted Duration Therapy Intervention (Days)  until discharge  -MM    SLP Diet Recommendation  soft textures;thin liquids  -MM    Recommended Precautions and Strategies  upright posture during/after eating;small bites of food and sips of liquid  -MM    SLP Rec. for Method of Medication Administration  meds whole;with thin liquids;as tolerated  -MM    Monitor for Signs of Aspiration  yes;notify SLP if any concerns;cough  -MM       Swallow Goals (SLP)    Oral Nutrition/Hydration Goal Selection (SLP)  oral nutrition/hydration, SLP goal 1;oral nutrition/hydration, SLP goal 2  -MM       Oral Nutrition/Hydration Goal 1 (SLP)    Oral Nutrition/Hydration Goal 1, SLP  Patient will be seen at a meal within 24-48 hours to assess tolerance of current diet with further recommendations to be given as indicated  -MM    Time Frame (Oral Nutrition/Hydration Goal 1, SLP)  2 days  -MM       Oral Nutrition/Hydration Goal 2 (SLP)    Oral Nutrition/Hydration Goal 2, SLP  Patient will tolerate safest and least restrictive diet with no complications from aspiration  -MM    Time Frame (Oral Nutrition/Hydration Goal 2, SLP)  by discharge  -MM      User Key  (r) = Recorded By, (t) = Taken By, (c) = Cosigned By    Initials Name Effective Dates    MM Monica Jameson SLP 03/01/19 -           EDUCATION  The patient has been educated in the  following areas:   safe swallow strategies.    SLP Recommendation and Plan  SLP Swallowing Diagnosis: functional oral phase, functional pharyngeal phase  SLP Diet Recommendation: soft textures, thin liquids  Recommended Precautions and Strategies: upright posture during/after eating, small bites of food and sips of liquid  SLP Rec. for Method of Medication Administration: meds whole, with thin liquids, as tolerated     Monitor for Signs of Aspiration: yes, notify SLP if any concerns, cough     Swallow Criteria for Skilled Therapeutic Interventions Met: demonstrates skilled criteria     Rehab Potential/Prognosis, Swallowing: good, to achieve stated therapy goals  Therapy Frequency (Swallow): PRN  Predicted Duration Therapy Intervention (Days): until discharge            SLP GOALS     Row Name 09/09/20 1100       Oral Nutrition/Hydration Goal 1 (SLP)    Oral Nutrition/Hydration Goal 1, SLP  Patient will be seen at a meal within 24-48 hours to assess tolerance of current diet with further recommendations to be given as indicated  -MM    Time Frame (Oral Nutrition/Hydration Goal 1, SLP)  2 days  -MM       Oral Nutrition/Hydration Goal 2 (SLP)    Oral Nutrition/Hydration Goal 2, SLP  Patient will tolerate safest and least restrictive diet with no complications from aspiration  -MM    Time Frame (Oral Nutrition/Hydration Goal 2, SLP)  by discharge  -MM      User Key  (r) = Recorded By, (t) = Taken By, (c) = Cosigned By    Initials Name Provider Type    Monica Campbell SLP Speech and Language Pathologist             Time Calculation:       Therapy Charges for Today     Code Description Service Date Service Provider Modifiers Qty    [id number]  ST EVAL ORAL PHARYNG SWALLOW 4 9/9/2020 Monica Jameson SLP GN 1               FREDI House  9/9/2020

## 2020-09-09 NOTE — THERAPY EVALUATION
Patient Name: Maura Garibay  : 1975    MRN: [id number]                              Today's Date: 2020       Admit Date: 2020    Visit Dx:     ICD-10-CM ICD-9-CM   1. Seizure (CMS/HCC) R56.9 780.39   2. Hypertensive emergency I16.1 401.9   3. Chronic kidney disease with end stage renal failure on dialysis (CMS/HCC) N18.6 585.6    Z99.2 V45.11     Patient Active Problem List   Diagnosis   • Seizure (CMS/HCC)     Past Medical History:   Diagnosis Date   • (HFpEF) heart failure with preserved ejection fraction (CMS/HCC)    • Acid reflux    • Anxiety and depression    • Cardiomyopathy (CMS/HCC)    • Chronic pain    • Dependence on renal dialysis (CMS/HCC)    • Hyperparathyroidism (CMS/HCC)    • Hypertension    • IBS (irritable bowel syndrome)    • Migraines    • Neuropathy    • Pulmonary emboli (CMS/HCC)    • Renal failure    • Seizures (CMS/HCC)    • TTP (thrombotic thrombocytopenic purpura) (CMS/HCC)      Past Surgical History:   Procedure Laterality Date   • APPENDECTOMY     • ARTERIOVENOUS FISTULA     • BRAIN SURGERY      Fistula placed   • BRAIN SURGERY      Cyst   • CHOLECYSTECTOMY     • COLONOSCOPY     • HYSTERECTOMY     • SALPINGO OOPHORECTOMY     • TONSILLECTOMY       General Information     Row Name 20 1536          PT Evaluation Time/Intention    Document Type  evaluation  -CM     Mode of Treatment  physical therapy  -CM     Row Name 20 1536          General Information    Patient Profile Reviewed?  yes  -CM     Prior Level of Function  independent:;gait;community mobility;yard work no home O2; RN reports psychological status is pt's baseline, as she has had pt before at another admission  -CM     Existing Precautions/Restrictions  fall  -CM     Barriers to Rehab  ineffective coping  -CM     Row Name 20 1536          Relationship/Environment    Lives With  friend(s) reports she lives w/ her friend, April  -CM     Row Name 20 1536          Resource/Environmental Concerns  "   Current Living Arrangements  home/apartment/condo  -CM     Row Name 09/09/20 1536          Cognitive Assessment/Intervention- PT/OT    Orientation Status (Cognition)  disoriented to;person;place;time  -CM     Cognitive Assessment/Intervention Comment  thought month was July; thought we were at [facility]; thought ANDRÉS was [place], then thought [place]. Corrected and reoriented pt to correct info.  Pt initially refusing PT, stating she is \"going to the Lord today.\"  Agreed when PT suggest she wait for Him in chair.   -CM     Row Name 09/09/20 1540 09/09/20 1536       Safety Issues, Functional Mobility    Safety Issues Affecting Function (Mobility)  impulsivity;awareness of need for assistance;insight into deficits/self awareness;at risk behavior observed;safety precaution awareness;safety precautions follow-through/compliance  -CM  insight into deficits/self awareness;awareness of need for assistance;at risk behavior observed;safety precaution awareness;safety precautions follow-through/compliance  -CM    Impairments Affecting Function (Mobility)  balance;cognition;endurance/activity tolerance;motor planning;strength;pain  -CM  balance;cognition;endurance/activity tolerance;motor planning;strength;pain  -CM      User Key  (r) = Recorded By, (t) = Taken By, (c) = Cosigned By    Initials Name Provider Type    CM Yovana Abrams, PT Physical Therapist        Mobility     Row Name 09/09/20 1539          Bed Mobility Assessment/Treatment    Bed Mobility Assessment/Treatment  bed mobility (all) activities  -CM     [place] Level (Bed Mobility)  minimum assist (75% patient effort)  -CM     Row Name 09/09/20 1539          Transfer Assessment/Treatment    Comment (Transfers)  needed 2nd person to manage lines, as pt very impulsive and had to be redirected to task multiple times.   -CM     Row Name 09/09/20 1539          Bed-Chair Transfer    Bed-Chair [place] (Transfers)  minimum assist (75% patient " "effort);1 person assist;1 person to manage equipment  -CM     Row Name 09/09/20 1540          Sit-Stand Transfer    Sit-Stand [place] (Transfers)  minimum assist (75% patient effort);1 person assist;1 person to manage equipment  -CM     Row Name 09/09/20 1540          Gait/Stairs Assessment/Training    Distance in Feet (Gait)  refused ambulation, again stating that she did not need to do so, as she is \"going to the Lord today.\"  -CM       User Key  (r) = Recorded By, (t) = Taken By, (c) = Cosigned By    Initials Name Provider Type    Yovana Leung, PT Physical Therapist        Obj/Interventions     Row Name 09/09/20 1541          General ROM    GENERAL ROM COMMENTS  BUEs and BLEs wfl.   -CM     [facility] Name 09/09/20 1541          MMT (Manual Muscle Testing)    General MMT Comments  4-/5 BUEs and BLEs  -CM     [facility] Name 09/09/20 1541          Static Sitting Balance    Level of [place] (Unsupported Sitting, Static Balance)  supervision  -CM     Sitting Position (Unsupported Sitting, Static Balance)  sitting on edge of bed  -     Time Able to Maintain Position (Unsupported Sitting, Static Balance)  more than 5 minutes  -CM     Comment (Unsupported Sitting, Static Balance)  sitter present in room  -[facility] Name 09/09/20 1541          Dynamic Sitting Balance    Level of [place], Reaches Outside Midline (Sitting, Dynamic Balance)  contact guard assist  -CM     Sitting Position, Reaches Outside Midline (Sitting, Dynamic Balance)  sitting on edge of bed  -[facility] Name 09/09/20 1541          Static Standing Balance    Level of [place] (Supported Standing, Static Balance)  minimal assist, 75% patient effort  -CM     Comment (Supported Standing, Static Balance)  gait belt   -CM     Row Name 09/09/20 1541          Dynamic Standing Balance    Level of [place], Reaches Outside Midline (Standing, Dynamic Balance)  minimal assist, 75% patient effort;moderate assist, 50 to 74% patient effort  -CM  "    Comment, Reaches Outside Midline (Standing, Dynamic Balance)  gait belt  -CM       User Key  (r) = Recorded By, (t) = Taken By, (c) = Cosigned By    Initials Name Provider Type    Yovana Leung, PT Physical Therapist        Goals/Plan     Row Name 09/09/20 1553          Bed Mobility Goal 1 (PT)    Activity/Assistive Device (Bed Mobility Goal 1, PT)  bed mobility activities, all  -CM     [place] Level/Cues Needed (Bed Mobility Goal 1, PT)  independent  -CM     Time Frame (Bed Mobility Goal 1, PT)  2 weeks  -CM     Row Name 09/09/20 1553          Transfer Goal 1 (PT)    Activity/Assistive Device (Transfer Goal 1, PT)  transfers, all  -CM     [place] Level/Cues Needed (Transfer Goal 1, PT)  independent  -CM     Time Frame (Transfer Goal 1, PT)  2 weeks  -CM     Row Name 09/09/20 1553          Gait Training Goal 1 (PT)    Activity/Assistive Device (Gait Training Goal 1, PT)  gait (walking locomotion)  -CM     [place] Level (Gait Training Goal 1, PT)  independent  -CM     Distance (Gait Goal 1, PT)  150 ft, no loss of balance  -CM     Time Frame (Gait Training Goal 1, PT)  2 weeks  -CM       User Key  (r) = Recorded By, (t) = Taken By, (c) = Cosigned By    Initials Name Provider Type    Yovana Leung, PT Physical Therapist        Clinical Impression     Row Name 09/09/20 1546          Pain Assessment    Additional Documentation  Pain Scale: Numbers Pre/Post-Treatment (Group)  -CM     Row Name 09/09/20 2732          Pain Scale: Numbers Pre/Post-Treatment    Pain Scale: Numbers, Pretreatment  10/10  -CM     Pain Scale: Numbers, Post-Treatment  10/10  -CM     Pain Location  head  -CM     Pre/Post Treatment Pain Comment  c/o headache and abdom pain, but not able to give more information than that  -CM     Pain Intervention(s)  Emotional support;Repositioned  -CM     Row Name 09/09/20 5685          Plan of Care Review    Plan of Care Reviewed With  patient  -CM     Outcome Summary  46 yo female  w/ multiple chronic illnesses admitted for onset of seizure activity, as well as pna. Hx of R frontal craniotomy, esrd MWF. Pt able to come to sitting w/ minimal assistance. Comes to standing and transfers to chair w/ min assist. She is unsteady in standing and has poor sequencing for movement. She is also impulsive and easily distracted, so she needs frequent redirection to task. Initially BP was quite high, but RN adjusted cardine drip, and BP was in workable range and normal by end of rx. Had large drop in BP after sitting in chair, but this recovered well w/ simple elevation of her feet. Pt not stable w/ ambulation, and she will need close monitoring of vital signs w/ movement. Recommend IP rehab at d/c.  PPE: gloves, mask, goggles.   -CM     Row Name 09/09/20 1601          Physical Therapy Clinical Impression    Criteria for Skilled Interventions Met (PT Clinical Impression)  yes;treatment indicated  -CM     Rehab Potential (PT Clinical Summary)  good, to achieve stated therapy goals  -CM     Predicted Duration of Therapy (PT)  until d/c.   -CM     Row Name 09/09/20 1542          Vital Signs    Pre Systolic BP Rehab  160 supine  -CM     Pre Treatment Diastolic BP  114 map 130; RN adjusted cardine drip.  -CM     Intra Systolic BP Rehab  72 in chair  -CM     Intra Treatment Diastolic BP  55 map 55  -CM     Post Systolic BP Rehab  146 in chair w/ feet elevated  -CM     Post Treatment Diastolic BP  89 map 102  -CM     Pretreatment Heart Rate (beats/min)  114  -CM     Intratreatment Heart Rate (beats/min)  122  -CM     Posttreatment Heart Rate (beats/min)  115  -CM     Posttreatment Resp Rate (breaths/min)  17  -CM     Pre SpO2 (%)  96  -CM     O2 Delivery Pre Treatment  room air 1L; removed O2, and pt remained stable on room air w/ no change in sats reading  -CM     O2 Delivery Intra Treatment  room air  -CM     Post SpO2 (%)  95  -CM     O2 Delivery Post Treatment  room air  -CM     Row Name 09/09/20 5488           Positioning and Restraints    Pre-Treatment Position  in bed  -CM     Post Treatment Position  chair  -CM     In Chair  notified nsg;sitting;call light within reach;encouraged to call for assist;exit alarm on;legs elevated  -CM       User Key  (r) = Recorded By, (t) = Taken By, (c) = Cosigned By    Initials Name Provider Type    Yovana Leung, PT Physical Therapist        Outcome Measures     Row Name 09/09/20 1554          How much help from another person do you currently need...    Turning from your back to your side while in flat bed without using bedrails?  4  -CM     Moving from lying on back to sitting on the side of a flat bed without bedrails?  3  -CM     Moving to and from a bed to a chair (including a wheelchair)?  3  -CM     Standing up from a chair using your arms (e.g., wheelchair, bedside chair)?  3  -CM     Climbing 3-5 steps with a railing?  2  -CM     To walk in hospital room?  3  -CM     AM-PAC 6 Clicks Score (PT)  18  -CM     Row Name 09/09/20 1559          Functional Assessment    Outcome Measure Options  AM-PAC 6 Clicks Basic Mobility (PT)  -CM       User Key  (r) = Recorded By, (t) = Taken By, (c) = Cosigned By    Initials Name Provider Type    Yovana Leung, PT Physical Therapist        Physical Therapy Education                 Title: PT OT SLP Therapies (In Progress)     Topic: Physical Therapy (In Progress)     Point: Mobility training (In Progress)     Description:   Instruct learner(s) on safety and technique for assisting patient out of bed, chair or wheelchair.  Instruct in the proper use of assistive devices, such as walker, crutches, cane or brace.              Patient Friendly Description:   It's important to get you on your feet again, but we need to do so in a way that is safe for you. Falling has serious consequences, and your personal safety is the most important thing of all.        When it's time to get out of bed, one of us or a family member will sit next to  you on the bed to give you support.     If your doctor or nurse tells you to use a walker, crutches, a cane, or a brace, be sure you use it every time you get out of bed, even if you think you don't need it.    Learning Progress Summary           Patient Nonacceptance, E,TB, NL,NR by  at 9/9/2020 1555                               User Key     Initials Effective Dates Name Provider Type Discipline    CM 03/01/19 -  Yovana Abrams, PT Physical Therapist PT              PT Recommendation and Plan  Planned Therapy Interventions (PT Eval): balance training, bed mobility training, gait training, patient/family education, motor coordination training, neuromuscular re-education, postural re-education, transfer training, strengthening  Outcome Summary/Treatment Plan (PT)  Anticipated Discharge Disposition (PT): inpatient rehabilitation facility  Plan of Care Reviewed With: patient  Outcome Summary: 44 yo female w/ multiple chronic illnesses admitted for onset of seizure activity, as well as pna. Hx of R frontal craniotomy, esrd MWF. Pt able to come to sitting w/ minimal assistance. Comes to standing and transfers to chair w/ min assist. She is unsteady in standing and has poor sequencing for movement. She is also impulsive and easily distracted, so she needs frequent redirection to task. Initially BP was quite high, but RN adjusted cardine drip, and BP was in workable range and normal by end of rx. Had large drop in BP after sitting in chair, but this recovered well w/ simple elevation of her feet. Pt not stable w/ ambulation, and she will need close monitoring of vital signs w/ movement. Recommend IP rehab at d/c.  PPE: gloves, mask, goggles.      Time Calculation:   PT Charges     Row Name 09/09/20 1557 09/09/20 1556          Time Calculation    Start Time  --  1328  -CM     Stop Time  --  1402  -CM     Time Calculation (min)  --  34 min  -CM     PT Received On  --  09/09/20  -CM     PT - Next Appointment  09/11/20   -CM  09/10/20  -CM     PT Goal Re-Cert Due Date  --  09/23/20  -CM        Time Calculation- PT    Total Timed Code Minutes- PT  --  0 minute(s)  -CM       User Key  (r) = Recorded By, (t) = Taken By, (c) = Cosigned By    Initials Name Provider Type    Yovana Leung, PT Physical Therapist        Therapy Charges for Today     Code Description Service Date Service Provider Modifiers Qty    02540003089 HC PT EVAL HIGH COMPLEXITY 4 9/9/2020 Yovana Abrams, PT GP 1          PT G-Codes  Outcome Measure Options: AM-PAC 6 Clicks Basic Mobility (PT)  AM-PAC 6 Clicks Score (PT): 18    Yovana Abrams, PT  9/9/2020

## 2020-09-09 NOTE — PROGRESS NOTES
LOS: 3 days     Chief Complaint:  Seizures       SUBJECTIVE:  History taken from: patient chart RN    Interval History: presented to the ER after being found by her neighbor shaking with seizure like activity.  EMS was called and when they arrived she was some jerking of her extremities. Upon arrival to the ER she was evaluated and had seizure like activity and was given two doses of Ativan and a gram of Keppra.     Pt evaluated by neurology on 9/8 and witnessed to have shaking of the arms and legs which was not typical for seizure. She was intermittently following commands during these spells. Her sister informed staff that the pt frequently shakes like this if she misses dialysis or does not take her medications.     There is question regarding a hx of seizures. Pt nodded yes when asked if she had a hx of seizures and indicates that she takes Keppra, but she was encephalopathic at that time so hx was limited.    - Portions of the above HPI were copied from previous encounters and edited as appropriate.    Per record review: Pt has been treated at [facility] multiple times for hypertensive urgency, emergent dialysis, chest pain, UTIs, and hyperkalemia. I am unable to find any records specifically pertaining to previous seizure activity, but Keppra and Vimpat are listed as home meds on a clinical summary from [facility] generated on 9/7/20.        Patient Complaints: Pt denies complaints at this time. She is sitting at BS working with PT.       Review of Systems   Constitutional: Negative for chills, diaphoresis and fatigue.   Eyes: Negative for photophobia and visual disturbance.   Neurological: Negative for dizziness, tremors, seizures, syncope, facial asymmetry, speech difficulty, weakness, light-headedness, numbness and headaches.          Pertinent PMH:  has a past medical history of (HFpEF) heart failure with preserved ejection fraction (CMS/HCC), Acid reflux, Anxiety and depression, Cardiomyopathy (CMS/HCC),  Chronic pain, Dependence on renal dialysis (CMS/[facility]), Hyperparathyroidism (CMS/[facility]), Hypertension, IBS (irritable bowel syndrome), Migraines, Neuropathy, Pulmonary emboli (CMS/[facility]), Renal failure, Seizures (CMS/[facility]), and TTP (thrombotic thrombocytopenic purpura) (CMS/[facility]).   ________________________________________________     OBJECTIVE:  Pt is awake and alert, sitting at BS working with PT. She is mildly encephalopathic, but answering questions appropriately and following commands. Very limited attention.       Neurologic Exam    On exam:  GENERAL: NAD, awake and alert  HEENT: Normocephalic, Atraumatic. Oropharynx clear and moist.   RESP: Breathing unlabored, breath sounds normal  CARDIO: RRR  SKIN: Warm, dry. No apparent rash.     NEURO:  Oriented to person, president, month/year. Knows she is at the hospital, but states she is at [place].   Mildly encephalopathic with limited attention  EOMI, PERRL, no visual field deficits  No facial asymmetry  Speech clear without dysarthria  Sensations intact and equal bilaterally  Strength 5/5 and equal in all extremities  No ataxia   ________________________________________________   RESULTS REVIEW    VITAL SIGNS:  Temp:  [97.7 °F (36.5 °C)-98.6 °F (37 °C)] 98.5 °F (36.9 °C)  Heart Rate:  [] 100  BP: (134-151)/(80-99) 145/99    LABS:   Lab Results   Component Value Date    WBC 5.60 09/09/2020    HGB 10.1 (L) 09/09/2020    HCT 31.2 (L) 09/09/2020    MCV 92.9 09/09/2020     09/09/2020     Lab Results   Component Value Date    GLUCOSE 96 09/09/2020    BUN  09/09/2020      Comment:      Testing performed by alternate method    BUN 17 09/09/2020    CREATININE 4.55 (H) 09/09/2020    EGFRIFNONA 10 (L) 09/09/2020    EGFRIFAFRI  09/09/2020      Comment:      <15 Indicative of kidney failure.    BCR  09/09/2020      Comment:      Testing not performed    K 3.9 09/09/2020    CO2 27.0 09/09/2020    CALCIUM 9.3 09/09/2020    ALBUMIN 3.70 09/09/2020    LABIL2 1.4 07/12/2020     AST 9 2020    ALT <5 2020       Lab Results   Component Value Date    TSH 0.562 2020     (H) 2020    HGBA1C 5.3 2020    PTZBAIUZ13 830 2020         IMAGING STUDIES:  Xr Chest 1 View    Result Date: 2020  1.Right subclavian catheter tip now projects at the cavoatrial junction. Left IJ port catheter tip also appears to be an area of the cavoatrial junction. 2.Stable cardiomegaly. 3.No significant interval change in diffuse interstitial and bibasilar airspace opacities. No pneumothorax is seen.  Electronically Signed By-DR. Mani Leo MD On:2020 11:34 AM This report was finalized on [id number] by DR. Mani Leo MD.      I reviewed the patient's new clinical results.    ________________________________________________      PROBLEM LIST:    Seizure (CMS/[facility])        Assessment/Plan   ASSESSMENT/PLAN:  1. Breakthrough seizure activity with reported hx of seizures. Provoking factors include pneumonia, uremia, and hypertensive emergency. There is question of medical compliance issues.   - CT head: No acute intracranial abnormality. Changes of prior right frontal craniotomy.  - EKG: Sinus tachycardia. Abnormal T, consider ischemia, lateral leads. ST elev, probable normal early repol pattern  - Labs: A1C: 5.3, B12: 830, TSH: 0.562, Ma.1, Calcium: 9.3, Phos: 4.3, Sodium: 139, WBC 5.6  - PT/OT/ST as appropriate, Neuro checks per protocol, DVT prophylaxis, seizure precautions  - Continue Keppra 500mg BID and Vimpat 100mg BID IV, switch to oral once tolerating PO. Will re-evaluate antiepileptics and verify home meds once pt is more alert and can verify home meds.     2. Acute encephalopathy. Likely multifactorial second to postictal state, pneumonia, hypertensive emergency/hypertensive encephalopathy  - Improving  - Continue treating underlying conditions.     3. End-stage renal disease on hemodialysis  - Creatinine up to 9.32, now 4.55 after dialysis  - s/p  dialysis, nephrology following    4.  Hypertensive emergency, improving  - /129 on admission  - Per chart review, pt has had multiple previous admissions at [facility] for uncontrolled hypertension and hypertensive urgency  - Cardene drip per primary, clonidine started, monitoring closely     5.  Pneumonia  -Antibiotics per primary    6. Elevated troponin  -Continue to trend  -Telemetry  -Per primary      SEIZURE/SYNCOPE INSTRUCTIONS:  -Recommended to observe all seizure precautions, including, but not limited to:   -No driving until seizure free for more than 3 months- as per State driving regulation / law;   -Avoid all high-risk activity, Take showers instead of baths, Avoid swimming without observation, Avoid open heat sources, Avoid working at heights, and Avoid engaging in all potentially hazardous activities.   -Patient expressed clear understanding.    Will follow peripherally. Please call with any seizure activity.     **Please refer to previous notes for further details and recommendations.     I discussed the patients findings and my recommendations with patient, nursing staff and consulting provider    NAIN Kaur  09/09/20  10:40

## 2020-09-09 NOTE — NURSING NOTE
Detailed discussion with Dr Gomez at bedside for goals of care due to noncompliance with the bipap. Will get abg around 1400 to check c02 because of increased confusion. Daughter ok with intubation if necessary.

## 2020-09-09 NOTE — PLAN OF CARE
Refused most interventions during shift including wearing oxygen, taking scheduled medications, keeping wires/leads on and having blood pressure checked as well as pulse ox monitor. Sitter at bedside for half of shift.

## 2020-09-09 NOTE — PROGRESS NOTES
PULMONARY/ CRITICAL CARE PROGRESS NOTE        Patient Name:  Maura Garibay    :  1975    Medical Record:  [id number]    PRIMARY CARE PHYSICIAN     Danial Landeros MD HOPI  Maura Garibay is a 45 y.o. female with a PMH significant for ESRD on HD MWF, seizures, hypertension, GERD, hyperparathyroidism, IBS, anemia, hyperphosphatemia, HFpEF, depression, TTP, anxiety/depression and tobacco abuse who presented to the ER after being found by her neighbor shaking with seizure like activity.  EMS was called and when they arrived she was some jerking of her extremities.  Upon arrival to the ER she was evaluated and had seizure like activity and was given two doses of Ativan and a gram of Keppra.  Currently patient is unable to provide details regarding her medical history due to being obtunded.        :  No new issues.  No seizures since admitted.  Awakens with voice, but not oriented.  :  No issues overnight.  No seizure activity seen.  Awakens to voice and follows commands today.  On Cardene gtt   :  More awake today, but is confused and combative.  On D10 and Cardene gtt     REVIEW OF SYSTEMS    Difficult to obtain due to confusion    HOME MEDICATIONS  Amitriptyline (ELAVIL) 25 MG tablet qhs    Aspirin 81 MG qday    AURYXIA 1  MG(Fe) tablet two tablets with meals    calcitriol (ROCALTROL) 0.25 MCG capsule 1 capsule by mouth 3 (three) times a week     cinacalcet (SENSIPAR) 30 MG tablet  by mouth 3 (three) times a week    cloNIDine (CATAPRES) 0.2 mg by mouth 3 (three) times daily     dilTIAZem ER beads (TIAZAC) 240 MG 24 hr capsule QD    diphenhydrAMINE (BENADRYL) 50 MG   every 4 (four) hours as needed for Itching    docusate sodium (COLACE) 100 MG capsule QD    escitalopram (LEXAPRO) 10 MG tablet QD    esomeprazole (NEXIUM) 40 MG QD    gabapentin (NEURONTIN) 600 MG tablet mg 4 (four) times daily    hydrALAZINE (APRESOLINE) 100 MG tablet BID    lacosamide (VIMPAT) 100 mg BID    levETIRAcetam 500  mg BID    linaclotide (LINZESS) 290 MCG QD    metoclopramide 5 MG one tablet four times daily before meals and nightly    promethazine 25 mg by mouth every 6 hours as needed for nausea    Requip 0.25 MG tablet nightly    TRAZODONE HCL PO 50 mg nightly    MEDICAL HISTORY    Past Medical History:   Diagnosis Date   • (HFpEF) heart failure with preserved ejection fraction (CMS/HCC)    • Acid reflux    • Anxiety and depression    • Cardiomyopathy (CMS/HCC)    • Chronic pain    • Dependence on renal dialysis (CMS/HCC)    • Hyperparathyroidism (CMS/HCC)    • Hypertension    • IBS (irritable bowel syndrome)    • Migraines    • Neuropathy    • Pulmonary emboli (CMS/HCC)    • Renal failure    • Seizures (CMS/HCC)    • TTP (thrombotic thrombocytopenic purpura) (CMS/HCC)         SURGICAL HISTORY    Past Surgical History:   Procedure Laterality Date   • APPENDECTOMY     • ARTERIOVENOUS FISTULA     • BRAIN SURGERY      Fistula placed   • BRAIN SURGERY      Cyst   • CHOLECYSTECTOMY     • COLONOSCOPY     • HYSTERECTOMY     • SALPINGO OOPHORECTOMY     • TONSILLECTOMY          FAMILY HISTORY    Family History   Problem Relation Age of Onset   • Kidney disease Mother    • Hypertension Mother    • Heart disease Father    • Heart disease Sister    • Heart disease Brother         SOCIAL HISTORY    Social History     Socioeconomic History   • Marital status:      Spouse name: Not on file   • Number of children: Not on file   • Years of education: Not on file   • Highest education level: Not on file   Tobacco Use   • Smoking status: Current Every Day Smoker     Packs/day: 0.50   Substance and Sexual Activity   • Alcohol use: No   • Drug use: No        ALLERGIES    Allergies   Allergen Reactions   • Butorphanol Hives   • Codeine    • Contrast Dye      IV CONTRAST   • Golytely [Peg 3350-Electrolytes] Unknown - Low Severity   • Haldol [Haloperidol] Hives   • Morphine And Related    • Sulfa Antibiotics    • Toradol [Ketorolac  Tromethamine]    • Vancomycin Hives         PHYSICAL EXAM    tMax 24 hrs:  Temp (24hrs), Av.2 °F (36.8 °C), Min:97.7 °F (36.5 °C), Max:98.6 °F (37 °C)    Vitals Ranges:  Temp:  [97.7 °F (36.5 °C)-98.6 °F (37 °C)] 98.5 °F (36.9 °C)  Heart Rate:  [] 100  BP: (134-151)/(80-99) 145/99  Intake and Output Last 3 Shifts:  I/O last 3 completed shifts:  In: 1761 [I.V.:1761]  Out: 0     Constitutional:  NAD  HEENT:   Atraumatic, PERRL, conjunctiva normal, moist oral mucosa, no nasal discharge.  Trachea is midline.  Respiratory:   No respiratory distress, normal breath sounds, no rales, no wheezing   Cardiovascular:  Tachy. Pulses 2+ and equal in all four extremities.   HARSH fistula with positive thrill and bruit.    GI:   Soft, nondistended, positive bowel sounds.  Extremities:   No edema, cyanosis or tenderness.  Integument:   No rashes  Neurologic:  Awake, confused, and combative      LABS    Lab Results (last 24 hours)     Procedure Component Value Units Date/Time    BUN [[id number]]  (Normal) Collected:  20    Specimen:  Blood Updated:  20     BUN 17 mg/dL     Renal Function Panel [[id number]]  (Abnormal) Collected:  20    Specimen:  Blood Updated:  20     Glucose 96 mg/dL      BUN --     Comment: Testing performed by alternate method        Creatinine 4.55 mg/dL      Sodium 139 mmol/L      Potassium 3.9 mmol/L      Chloride 95 mmol/L      CO2 27.0 mmol/L      Calcium 9.3 mg/dL      Albumin 3.70 g/dL      Phosphorus 4.3 mg/dL      Anion Gap 17.0 mmol/L      BUN/Creatinine Ratio --     Comment: Testing not performed        eGFR Non African Amer 10 mL/min/1.73      Comment: <15 Indicative of kidney failure.        eGFR   Amer --     Comment: <15 Indicative of kidney failure.       Narrative:       GFR Normal >60  Chronic Kidney Disease <60  Kidney Failure <15      Magnesium [[id number]]  (Normal) Collected:  20    Specimen:  Blood Updated:  20 0522      Magnesium 2.1 mg/dL     Blood Culture - Blood, Blood, Central Line [[id number]] Collected:  09/07/20 0448    Specimen:  Blood, Central Line Updated:  09/09/20 0500     Blood Culture No growth at 2 days    CBC & Differential [[id number]] Collected:  09/09/20 0436    Specimen:  Blood Updated:  09/09/20 0445    Narrative:       The following orders were created for panel order CBC & Differential.  Procedure                               Abnormality         Status                     ---------                               -----------         ------                     CBC Auto Differential[[id number]]        Abnormal            Final result                 Please view results for these tests on the individual orders.    CBC Auto Differential [[id number]]  (Abnormal) Collected:  09/09/20 0436    Specimen:  Blood Updated:  09/09/20 0445     WBC 5.60 10*3/mm3      RBC 3.36 10*6/mm3      Hemoglobin 10.1 g/dL      Hematocrit 31.2 %      MCV 92.9 fL      MCH 30.1 pg      MCHC 32.4 g/dL      RDW 17.6 %      RDW-SD 56.4 fl      MPV 8.2 fL      Platelets 157 10*3/mm3      Neutrophil % 76.3 %      Lymphocyte % 14.3 %      Monocyte % 7.6 %      Eosinophil % 1.0 %      Basophil % 0.8 %      Neutrophils, Absolute 4.30 10*3/mm3      Lymphocytes, Absolute 0.80 10*3/mm3      Monocytes, Absolute 0.40 10*3/mm3      Eosinophils, Absolute 0.10 10*3/mm3      Basophils, Absolute 0.00 10*3/mm3      nRBC 0.0 /100 WBC     Blood Culture - Blood, Hand, Left [[id number]] Collected:  09/06/20 2349    Specimen:  Blood from Hand, Left Updated:  09/09/20 0045     Blood Culture No growth at 2 days    Blood Culture - Blood, Blood, Central Line [[id number]] Collected:  09/06/20 2351    Specimen:  Blood, Central Line Updated:  09/09/20 0015     Blood Culture No growth at 2 days    POC Glucose Once [[id number]]  (Normal) Collected:  09/09/20 0009    Specimen:  Blood Updated:  09/09/20 0012     Glucose 97 mg/dL      Comment: Serial Number: [id number]:   [id number]       POC Glucose Once [[id number]]  (Abnormal) Collected:  09/08/20 1714    Specimen:  Blood Updated:  09/08/20 1716     Glucose 68 mg/dL      Comment: Serial Number: [id number]:  [id number]       POC Glucose Once [[id number]]  (Abnormal) Collected:  09/08/20 1231    Specimen:  Blood Updated:  09/08/20 1232     Glucose 166 mg/dL      Comment: Serial Number: [id number]:  [id number]       POC Glucose Once [[id number]]  (Normal) Collected:  09/08/20 1149    Specimen:  Blood Updated:  09/08/20 1157     Glucose 76 mg/dL      Comment: Serial Number: [id number]:  [id number]       Potassium [[id number]]  (Abnormal) Collected:  09/08/20 1119    Specimen:  Blood Updated:  09/08/20 1156     Potassium 6.1 mmol/L     POC Glucose Once [[id number]]  (Abnormal) Collected:  09/08/20 1123    Specimen:  Blood Updated:  09/08/20 1130     Glucose 67 mg/dL      Comment: Serial Number: [id number]:  [id number]             MICRO:  Microbiology Results (last 10 days)     Procedure Component Value - Date/Time    Blood Culture - Blood, Blood, Central Line [[id number]] Collected:  09/07/20 0448    Lab Status:  Preliminary result Specimen:  Blood, Central Line Updated:  09/09/20 0500     Blood Culture No growth at 2 days    Blood Culture - Blood, Blood, Central Line [[id number]] Collected:  09/06/20 2351    Lab Status:  Preliminary result Specimen:  Blood, Central Line Updated:  09/09/20 0015     Blood Culture No growth at 2 days    MRSA Screen, PCR (Inpatient) - Swab, Nares [[id number]]  (Normal) Collected:  09/06/20 2350    Lab Status:  Final result Specimen:  Swab from Nares Updated:  09/07/20 0256     MRSA PCR No MRSA Detected    Blood Culture - Blood, Hand, Left [[id number]] Collected:  09/06/20 2349    Lab Status:  Preliminary result Specimen:  Blood from Hand, Left Updated:  09/09/20 0045     Blood Culture No growth at 2 days    COVID-19,[facility]-HOU[facility],Nasal Swab No Transport Media 3-4 HR TAT - Swab, Nasal Cavity  [[id number]]  (Normal) Collected:  09/06/20 2148    Lab Status:  Final result Specimen:  Swab from Nasal Cavity Updated:  09/06/20 2226     COVID19 Not Detected    Narrative:       Fact sheet for providers: https://www.fda.gov/media/317191/download     Fact sheet for patients: https://www.fda.gov/media/372982/download          IMAGING & OTHER STUDIES    Imaging Results (Last 72 Hours)     Procedure Component Value Units Date/Time    XR Chest 1 View [[id number]] Collected:  09/07/20 1131     Updated:  09/07/20 1136    Narrative:       DATE OF EXAM:  9/7/2020 11:08 AM     PROCEDURE:  XR CHEST 1 VW-     INDICATIONS:  central line; R56.9-Unspecified convulsions; I16.1-Hypertensive  emergency; N18.6-End stage renal disease; Z99.2-Dependence on renal  dialysis     COMPARISON:  09/06/2020     TECHNIQUE:   Single radiographic view of the chest was obtained.     FINDINGS:  Right subclavian catheter appears to have been withdrawn with tip now  projecting at the cavoatrial junction.  Left IJ port catheter also now  appears to be in the area of the cavoatrial junction.  No pneumothorax  is identified. The heart is enlarged. No significant interval change in  diffuse interstitial and bibasilar opacities. No definite effusion is  seen. There is right convex scoliosis of the upper thoracic spine.       Impression:       1.Right subclavian catheter tip now projects at the cavoatrial junction.  Left IJ port catheter tip also appears to be an area of the cavoatrial  junction.  2.Stable cardiomegaly.  3.No significant interval change in diffuse interstitial and bibasilar  airspace opacities. No pneumothorax is seen.     Electronically Signed By-DR. Mani Leo MD On:9/7/2020 11:34 AM  This report was finalized on [id number] by DR. Mani Leo MD.    XR Chest 1 View [[id number]] Collected:  09/07/20 0800     Updated:  09/07/20 0804    Narrative:       DATE OF EXAM:  9/6/2020 8:23 PM     PROCEDURE:  XR CHEST 1 VW-      INDICATIONS:  Central line placement     COMPARISON:  No Comparisons Available     TECHNIQUE:   Single radiographic view of the chest was obtained.     FINDINGS:  There is a right subclavian catheter with tip projecting in the right  atrium. Catheter tip appears to be approximate 6 cm inferior to the area  of the cavoatrial junction on this exam. There is also a left IJ port  catheter with tip projecting in the superior right atrium. No  pneumothorax is seen.  The heart is enlarged.  There are diffuse  interstitial and bibasilar airspace opacities. No significant effusion  or pneumothorax is seen. There is right convex scoliosis of the upper  thoracic spine. Left upper arm vascular stent is incompletely  visualized.       Impression:       1.Right subclavian catheter projects in the right atrium. Catheter could  be withdrawn approximately 6 cm to be in the area of the cavoatrial  junction on this exam.  2.Left IJ port catheter terminates in the superior right atrium.  3.No pneumothorax is seen.  4.Cardiomegaly.  5.Diffuse interstitial and bibasilar airspace opacities which could  indicate edema or pneumonia.     Electronically Signed By-DR. Mani Leo MD On:9/7/2020 8:02 AM  This report was finalized on [id number] by DR. Mani Leo MD.    CT Head Without Contrast [[id number]] Collected:  09/06/20 1821     Updated:  09/06/20 2030    Narrative:       EXAMINATION: CT HEAD WITHOUT CONTRAST    DATE: 9/6/2020 7:59 PM    INDICATION: Hypertension and seizure    COMPARISON: None available at the time of this dictation.    TECHNIQUE: Noncontrast imaging obtained from the vertex to the skull base.  CT dose lowering techniques were used, to include: automated exposure control, adjustment for patient size, and or use of iterative reconstruction.?    FINDINGS:    Soft Tissues: No significant soft tissue abnormality.    Skull: Right frontal craniotomy    Sinuses: Paranasal sinuses are clear.    Mastoids: Mastoid air  cells are clear.     Globes and Orbits: Globes and orbits are intact.    Brain: No acute hemorrhage.  No midline shift, masses, or mass effect.  No evidence of acute infarct by noncontrast CT.    Ventricles and Cisterns: Ventricular size and configuration is within normal limits. Basal cisterns are patent. No abnormal extra-axial fluid collection.            Impression:           1. No acute intracranial abnormality.    2. Changes of prior right frontal craniotomy.      Electronically signed by:  Yohannes Guerrero M.D.    9/6/2020 6:29 PM            ASSESSMENT/PLAN  Seizure (CMS/HCC)  -Patient with history of same  -Keppra and Vimpat at outpatient  -?compliance  -Keppra 500 mg IV bid and Vimapt 100 mg IV BID and switch to oral when appropriate  -EEG if no improvement in mental status   -CK level 52    Hypertensive emergency  -/129 on admission  -Cardene gtt, cont.  Try to wean  -check with renal to see of ok to change Clonidine to Norvasc     Pneumonia  -? aspiration  -Given Cefepime and Clindamycin given h/o seizures. Now on Unasyn, cont  -COVID19 swab Negative  -RVP ordered  -Urine antigen for Strep and Legionella pending  -MRSA nasal swab Negative    Encephalopathy  -CT head negative for acute findings  -? postictal state vs hypertensive encephalopathy  -Utox, ASA pending  -APAP < 5    ESRD with HD dependence  -Will consult Dr. Tello = sees Dr. Rod Ch    Anemia  -Hemoglobin 10.6 on admission  -No signs of active bleeding  -EGD 07/14/20 at [place] without acute findings    Hyperparathyroidism  -Continue Sensipar and Calcitriol per renal    Hyperphosphatemia  -Continue phos binders per renal    Anxiety/depression  -Continue Lexapro and Requip      PUD: Protonix  Insulin:   Sliding scale  VTE:   SCDs/  Lovenox  Nutrition:  NPO, speech therapy to see for diet eval

## 2020-09-09 NOTE — NURSING NOTE
More alert than yesterday but very confused and combative, removing equipment repeatedly. Educated on need to keep oxygen in and wires on.
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
## 2020-09-09 NOTE — PROGRESS NOTES
"   LOS: 3 days    Patient Care Team:  Danial Landeros MD as PCP - General (Family Medicine)  Dewey Jack MD as PCP - Claims Attributed      Subjective     Patient more awake this morning.  Somewhat confused but able to answer simple questions.  No acute distress overnight    Objective     Vital Sign Min/Max for last 24 hours  Temp:  [97.7 °F (36.5 °C)-98.6 °F (37 °C)] 98.5 °F (36.9 °C)  Heart Rate:  [] 100  BP: (134-152)/(80-99) 145/99                       Flowsheet Rows      First Filed Value   Admission Height  170.2 cm (67\") Documented at 09/06/2020 1756   Admission Weight  81.6 kg (180 lb) Documented at 09/06/2020 1756          No intake/output data recorded.  I/O last 3 completed shifts:  In: 1761 [I.V.:1761]  Out: 0     Physical Exam:  Physical Exam    General.  Awake, alert  Head.  Atraumatic, normocephalic  Neck.  Supple  Respiratory.  Clear to auscultation  Cardiovascular.  Regular rhythm  Abdominal.  Obese, soft, nontender  Extremities.  Trace edema       LABS:  Lab Results   Component Value Date    CALCIUM 9.3 09/09/2020    PHOS 4.3 09/09/2020     Results from last 7 days   Lab Units 09/09/20  0436 09/08/20  1119 09/08/20  0644 09/07/20  0448 09/06/20  1906   MAGNESIUM mg/dL 2.1  --  2.6 2.5  --    SODIUM mmol/L 139  --  142 142 143   POTASSIUM mmol/L 3.9 6.1* 6.3* 4.8 5.7*   CHLORIDE mmol/L 95*  --  101 102 101   CO2 mmol/L 27.0  --  18.0* 25.0 25.0   BUN  17  --  67* 47* 43*   CREATININE mg/dL 4.55*  --  9.32* 7.78* 7.27*   GLUCOSE mg/dL 96  --  73 90 100*   CALCIUM mg/dL 9.3  --  9.3 8.6 8.8   WBC 10*3/mm3 5.60  --  5.80 5.80 8.00   HEMOGLOBIN g/dL 10.1*  --  10.5* 9.9* 10.6*   PLATELETS 10*3/mm3 157  --  156 134* 147   ALT (SGPT) U/L  --   --  <5  --  <5   AST (SGOT) U/L  --   --  9  --  10     Lab Results   Component Value Date    CKTOTAL 52 09/06/2020    TROPONINI 0.027 07/12/2020    TROPONINT 0.035 (C) 09/07/2020     Estimated Creatinine Clearance: 16.4 mL/min (A) (by C-G formula based " on SCr of 4.55 mg/dL (H)).      Brief Urine Lab Results  (Last result in the past 365 days)      Color   Clarity   Blood   Leuk Est   Nitrite   Protein   CREAT   Urine HCG        09/06/20 2101 Yellow Clear Moderate (2+) Large (3+) Negative >=300 mg/dL (3+)             WEIGHTS:     Wt Readings from Last 1 Encounters:   09/09/20 0400 65.7 kg (144 lb 13.5 oz)   09/08/20 0400 66.6 kg (146 lb 13.2 oz)   09/07/20 0400 65.5 kg (144 lb 6.4 oz)   09/06/20 2210 65.5 kg (144 lb 6.4 oz)   09/06/20 1756 81.6 kg (180 lb)         amitriptyline 25 mg Oral Nightly   ampicillin-sulbactam 1.5 g Intravenous Q12H   escitalopram 20 mg Oral Daily   gabapentin 100 mg Oral TID   heparin (porcine) 5,000 Units Subcutaneous Q8H   insulin lispro 0-7 Units Subcutaneous Q6H   lacosamide (VIMPAT) IVPB 100 mg Intravenous Q12H   levETIRAcetam 500 mg Intravenous Q12H   pantoprazole 40 mg Oral Q AM   rOPINIRole 0.25 mg Oral Nightly   sodium chloride 10 mL Intravenous Q12H       dextrose 20 mL/hr Last Rate: 20 mL/hr (09/08/20 1815)   niCARdipine 5-15 mg/hr Last Rate: 2.5 mg/hr (09/09/20 0320)       Assessment/Plan     1.  ESRD  Hemodialysis tomorrow  Electrolytes, volume status okay  Hyperkalemia improved after dialysis    2.  Hypertension with ESRD  Oral antihypertensives to be restarted after well evaluation  Cardene drip will be weaned off    3.  Anemia with ESRD  Epogen with dialysis    4.  Seizures  Neurology following    Juanjose Tello MD  09/09/20  09:25

## 2020-09-09 NOTE — NURSING NOTE
Spoke with jeane in pharmacy. Wasted gabapentin refused by patient in sharps container with BRITTANY Menchaca at bedside.

## 2020-09-10 PROBLEM — D63.1 ANEMIA ASSOCIATED WITH CHRONIC RENAL FAILURE: Status: ACTIVE | Noted: 2017-12-10

## 2020-09-10 PROBLEM — I16.1 HYPERTENSIVE EMERGENCY: Status: ACTIVE | Noted: 2020-09-10

## 2020-09-10 PROBLEM — Z86.711 HISTORY OF PULMONARY EMBOLISM: Status: ACTIVE | Noted: 2020-09-10

## 2020-09-10 PROBLEM — M51.36 DDD (DEGENERATIVE DISC DISEASE), LUMBAR: Status: ACTIVE | Noted: 2020-09-10

## 2020-09-10 PROBLEM — I10 HTN (HYPERTENSION): Status: ACTIVE | Noted: 2020-05-26

## 2020-09-10 PROBLEM — Z76.5 DRUG-SEEKING BEHAVIOR: Status: ACTIVE | Noted: 2018-03-03

## 2020-09-10 PROBLEM — I10 BENIGN ESSENTIAL HTN: Chronic | Status: ACTIVE | Noted: 2020-09-10

## 2020-09-10 PROBLEM — E21.3 HYPERPARATHYROIDISM: Status: ACTIVE | Noted: 2020-09-10

## 2020-09-10 PROBLEM — E83.39 HYPERPHOSPHATEMIA: Status: ACTIVE | Noted: 2019-09-08

## 2020-09-10 PROBLEM — F32.A DEPRESSION: Status: ACTIVE | Noted: 2020-09-10

## 2020-09-10 PROBLEM — I10 HTN (HYPERTENSION): Chronic | Status: ACTIVE | Noted: 2020-05-26

## 2020-09-10 PROBLEM — F32.A DEPRESSION: Chronic | Status: ACTIVE | Noted: 2020-09-10

## 2020-09-10 PROBLEM — E83.39 HYPERPHOSPHATEMIA: Chronic | Status: ACTIVE | Noted: 2019-09-08

## 2020-09-10 PROBLEM — G43.909 MIGRAINES: Status: ACTIVE | Noted: 2020-09-10

## 2020-09-10 PROBLEM — N18.9 ANEMIA ASSOCIATED WITH CHRONIC RENAL FAILURE: Status: ACTIVE | Noted: 2017-12-10

## 2020-09-10 PROBLEM — G93.40 ACUTE ENCEPHALOPATHY: Status: ACTIVE | Noted: 2020-09-10

## 2020-09-10 PROBLEM — I10 BENIGN ESSENTIAL HTN: Status: ACTIVE | Noted: 2020-09-10

## 2020-09-10 PROBLEM — I50.32 CHRONIC DIASTOLIC (CONGESTIVE) HEART FAILURE (HCC): Status: ACTIVE | Noted: 2018-09-13

## 2020-09-10 PROBLEM — E21.3 HYPERPARATHYROIDISM (HCC): Chronic | Status: ACTIVE | Noted: 2020-09-10

## 2020-09-10 PROBLEM — Z86.2 HISTORY OF TTP (THROMBOTIC THROMBOCYTOPENIC PURPURA): Status: ACTIVE | Noted: 2020-09-10

## 2020-09-10 PROBLEM — K21.9 GERD (GASTROESOPHAGEAL REFLUX DISEASE): Status: ACTIVE | Noted: 2020-09-10

## 2020-09-10 PROBLEM — I50.30 (HFPEF) HEART FAILURE WITH PRESERVED EJECTION FRACTION (HCC): Chronic | Status: ACTIVE | Noted: 2020-09-10

## 2020-09-10 PROBLEM — J18.9 PNEUMONIA: Status: ACTIVE | Noted: 2020-09-10

## 2020-09-10 PROBLEM — K21.9 GERD (GASTROESOPHAGEAL REFLUX DISEASE): Chronic | Status: ACTIVE | Noted: 2020-09-10

## 2020-09-10 PROBLEM — I50.32 CHRONIC DIASTOLIC (CONGESTIVE) HEART FAILURE: Chronic | Status: ACTIVE | Noted: 2018-09-13

## 2020-09-10 PROBLEM — E53.8 B12 DEFICIENCY: Status: ACTIVE | Noted: 2019-05-03

## 2020-09-10 LAB
ALBUMIN SERPL-MCNC: 3.9 G/DL (ref 3.5–5.2)
ANION GAP SERPL CALCULATED.3IONS-SCNC: 17 MMOL/L (ref 5–15)
BASOPHILS # BLD AUTO: 0 10*3/MM3 (ref 0–0.2)
BASOPHILS NFR BLD AUTO: 0.4 % (ref 0–1.5)
BUN SERPL-MCNC: 27 MG/DL (ref 6–20)
BUN SERPL-MCNC: ABNORMAL MG/DL
BUN/CREAT SERPL: ABNORMAL
CALCIUM SPEC-SCNC: 9.2 MG/DL (ref 8.6–10.5)
CHLORIDE SERPL-SCNC: 98 MMOL/L (ref 98–107)
CO2 SERPL-SCNC: 24 MMOL/L (ref 22–29)
CREAT SERPL-MCNC: 6.12 MG/DL (ref 0.57–1)
DEPRECATED RDW RBC AUTO: 58.6 FL (ref 37–54)
EOSINOPHIL # BLD AUTO: 0 10*3/MM3 (ref 0–0.4)
EOSINOPHIL NFR BLD AUTO: 0.5 % (ref 0.3–6.2)
ERYTHROCYTE [DISTWIDTH] IN BLOOD BY AUTOMATED COUNT: 18 % (ref 12.3–15.4)
GFR SERPL CREATININE-BSD FRML MDRD: 7 ML/MIN/1.73
GFR SERPL CREATININE-BSD FRML MDRD: ABNORMAL ML/MIN/{1.73_M2}
GLUCOSE BLDC GLUCOMTR-MCNC: 102 MG/DL (ref 70–105)
GLUCOSE BLDC GLUCOMTR-MCNC: 107 MG/DL (ref 70–105)
GLUCOSE BLDC GLUCOMTR-MCNC: 73 MG/DL (ref 70–105)
GLUCOSE SERPL-MCNC: 116 MG/DL (ref 65–99)
HCT VFR BLD AUTO: 32.3 % (ref 34–46.6)
HGB BLD-MCNC: 10.7 G/DL (ref 12–15.9)
LYMPHOCYTES # BLD AUTO: 0.7 10*3/MM3 (ref 0.7–3.1)
LYMPHOCYTES NFR BLD AUTO: 13.2 % (ref 19.6–45.3)
MAGNESIUM SERPL-MCNC: 2.1 MG/DL (ref 1.6–2.6)
MCH RBC QN AUTO: 30.8 PG (ref 26.6–33)
MCHC RBC AUTO-ENTMCNC: 33 G/DL (ref 31.5–35.7)
MCV RBC AUTO: 93.3 FL (ref 79–97)
MONOCYTES # BLD AUTO: 0.5 10*3/MM3 (ref 0.1–0.9)
MONOCYTES NFR BLD AUTO: 8.4 % (ref 5–12)
NEUTROPHILS NFR BLD AUTO: 4.3 10*3/MM3 (ref 1.7–7)
NEUTROPHILS NFR BLD AUTO: 77.5 % (ref 42.7–76)
NRBC BLD AUTO-RTO: 0 /100 WBC (ref 0–0.2)
PHOSPHATE SERPL-MCNC: 5 MG/DL (ref 2.5–4.5)
PLATELET # BLD AUTO: 165 10*3/MM3 (ref 140–450)
PMV BLD AUTO: 8.9 FL (ref 6–12)
POTASSIUM SERPL-SCNC: 4.1 MMOL/L (ref 3.5–5.2)
RBC # BLD AUTO: 3.47 10*6/MM3 (ref 3.77–5.28)
SODIUM SERPL-SCNC: 139 MMOL/L (ref 136–145)
WBC # BLD AUTO: 5.6 10*3/MM3 (ref 3.4–10.8)

## 2020-09-10 PROCEDURE — 25010000003 AMPICILLIN-SULBACTAM PER 1.5 G: Performed by: INTERNAL MEDICINE

## 2020-09-10 PROCEDURE — 82962 GLUCOSE BLOOD TEST: CPT

## 2020-09-10 PROCEDURE — 83735 ASSAY OF MAGNESIUM: CPT | Performed by: NURSE PRACTITIONER

## 2020-09-10 PROCEDURE — 85025 COMPLETE CBC W/AUTO DIFF WBC: CPT | Performed by: NURSE PRACTITIONER

## 2020-09-10 PROCEDURE — 99222 1ST HOSP IP/OBS MODERATE 55: CPT | Performed by: HOSPITALIST

## 2020-09-10 PROCEDURE — 80069 RENAL FUNCTION PANEL: CPT | Performed by: INTERNAL MEDICINE

## 2020-09-10 PROCEDURE — 25010000002 HEPARIN (PORCINE) PER 1000 UNITS: Performed by: INTERNAL MEDICINE

## 2020-09-10 PROCEDURE — 25010000002 LEVETIRACETAM IN NACL 0.82% 500 MG/100ML SOLUTION: Performed by: NURSE PRACTITIONER

## 2020-09-10 PROCEDURE — 25010000002 LEVETIRACETAM IN NACL 0.82% 500 MG/100ML SOLUTION: Performed by: INTERNAL MEDICINE

## 2020-09-10 RX ORDER — LABETALOL HYDROCHLORIDE 5 MG/ML
20 INJECTION, SOLUTION INTRAVENOUS ONCE
Status: COMPLETED | OUTPATIENT
Start: 2020-09-10 | End: 2020-09-10

## 2020-09-10 RX ORDER — CLONIDINE 0.2 MG/24H
1 PATCH, EXTENDED RELEASE TRANSDERMAL WEEKLY
Status: DISCONTINUED | OUTPATIENT
Start: 2020-09-10 | End: 2020-09-13 | Stop reason: HOSPADM

## 2020-09-10 RX ADMIN — HEPARIN SODIUM 5000 UNITS: 5000 INJECTION INTRAVENOUS; SUBCUTANEOUS at 23:04

## 2020-09-10 RX ADMIN — SODIUM CHLORIDE 100 MG: 900 INJECTION, SOLUTION INTRAVENOUS at 21:30

## 2020-09-10 RX ADMIN — Medication 10 ML: at 21:30

## 2020-09-10 RX ADMIN — AMPICILLIN SODIUM AND SULBACTAM SODIUM 1.5 G: 1; .5 INJECTION, POWDER, FOR SOLUTION INTRAMUSCULAR; INTRAVENOUS at 23:04

## 2020-09-10 RX ADMIN — SODIUM CHLORIDE 10 MG/HR: 9 INJECTION, SOLUTION INTRAVENOUS at 01:48

## 2020-09-10 RX ADMIN — DEXTROSE MONOHYDRATE 35 ML/HR: 10 INJECTION, SOLUTION INTRAVENOUS at 09:37

## 2020-09-10 RX ADMIN — SODIUM CHLORIDE 100 MG: 900 INJECTION, SOLUTION INTRAVENOUS at 09:37

## 2020-09-10 RX ADMIN — LEVETIRACETAM 500 MG: 5 INJECTION INTRAVENOUS at 10:22

## 2020-09-10 RX ADMIN — LEVETIRACETAM 500 MG: 5 INJECTION INTRAVENOUS at 22:01

## 2020-09-10 RX ADMIN — AMPICILLIN SODIUM AND SULBACTAM SODIUM 1.5 G: 1; .5 INJECTION, POWDER, FOR SOLUTION INTRAMUSCULAR; INTRAVENOUS at 11:54

## 2020-09-10 RX ADMIN — CLONIDINE 1 PATCH: 0.2 PATCH, EXTENDED RELEASE TRANSDERMAL at 11:55

## 2020-09-10 RX ADMIN — LABETALOL 20 MG/4 ML (5 MG/ML) INTRAVENOUS SYRINGE 20 MG: at 23:04

## 2020-09-10 NOTE — CONSULTS
[facility] Medicine Services      Patient Name: Maura Garibay  : 1975  MRN: [id number]  Primary Care Physician: Danial Landeros MD  Date of admission: 2020    Patient Care Team:  Danial Landeros MD as PCP - General (Family Medicine)  Dewey Jack MD as PCP - Claims Attributed          Subjective   History Present Illness     Chief Complaint:   Chief Complaint   Patient presents with   • Seizures     Note taken from Intensivist with additional editing:  Maura Garibay is a 45 y.o. female with a PMH significant for ESRD on HD MWF, seizures, hypertension, GERD, hyperparathyroidism, IBS, anemia, hyperphosphatemia, HFpEF, depression, TTP, anxiety/depression and tobacco abuse who presented to the ER on 2020 after being found by her neighbor shaking with seizure like activity.  EMS was called and when they arrived she had some jerking of her extremities.  Upon arrival to the ER, she was evaluated and had seizure like activity and was given two doses of Ativan and a gram of Keppra.    Patient was unable to provide history due to being obtunded.  Patient was admitted to ICU for further care and management.    2020 Neurology was consulted.      2020 Patient placed on Cardene drip.  Nephrology was consulted due to end stage renal disease.    2020 Per neurology's note there is question on whether patient is compliant on her medication.  Patient was noted to be on D10 and Cardene drip.    9/10/2020 Patient was taken off Cardene drip.  It was noted patient remained hypoglycemic and D10 was continued.  Patient stable for downgrade.  Hospitalist were consulted for medical management.  Patient will receive dialysis today. Patient is slow to respond.  Patient currently on 2 L nasal cannula.  Patient has complaints of abdominal pain with tenderness.      Review of Systems   Constitution: Negative.   HENT: Negative.    Cardiovascular: Negative.    Respiratory: Negative.     Skin: Negative.    Musculoskeletal: Negative.    Gastrointestinal: Positive for abdominal pain.   Genitourinary: Negative.    Neurological: Negative.    Psychiatric/Behavioral: Negative.            Personal History     Past Medical History:   Past Medical History:   Diagnosis Date   • (HFpEF) heart failure with preserved ejection fraction (CMS/HCC)    • Acid reflux    • Anxiety and depression    • Cardiomyopathy (CMS/HCC)    • Chronic pain    • Dependence on renal dialysis (CMS/HCC)    • Hyperparathyroidism (CMS/HCC)    • Hypertension    • IBS (irritable bowel syndrome)    • Migraines    • Neuropathy    • Pulmonary emboli (CMS/HCC)    • Renal failure    • Seizures (CMS/HCC)    • TTP (thrombotic thrombocytopenic purpura) (CMS/HCC)        Surgical History:      Past Surgical History:   Procedure Laterality Date   • APPENDECTOMY     • ARTERIOVENOUS FISTULA     • BRAIN SURGERY      Fistula placed   • BRAIN SURGERY      Cyst   • CHOLECYSTECTOMY     • COLONOSCOPY     • HYSTERECTOMY     • SALPINGO OOPHORECTOMY     • TONSILLECTOMY             Family History: family history includes Heart disease in her brother, father, and sister; Hypertension in her mother; Kidney disease in her mother. Otherwise pertinent FHx was reviewed and unremarkable.     Social History:  reports that she has been smoking. She has been smoking about 0.50 packs per day. She does not have any smokeless tobacco history on file. She reports that she does not drink alcohol or use drugs.      Medications:  Prior to Admission medications    Medication Sig Start Date End Date Taking? Authorizing Provider   amitriptyline (ELAVIL) 25 MG tablet Take 25 mg by mouth Every Night.   Yes ProviderHector MD   cloNIDine (CATAPRES) 0.2 MG tablet Take 0.2 mg by mouth 2 (Two) Times a Day.   Yes ProviderHector MD   escitalopram (LEXAPRO) 20 MG tablet Take 20 mg by mouth Daily.   Yes Hector Iverson MD   gabapentin (NEURONTIN) 600 MG tablet Take 600 mg  by mouth 3 (Three) Times a Day.   Yes Hector Iverson MD   HYDROcodone-acetaminophen (NORCO)  MG per tablet Take 1 tablet by mouth 3 (Three) Times a Day.   Yes Hector Iverson MD   lisinopril (PRINIVIL,ZESTRIL) 40 MG tablet Take 40 mg by mouth Every Night.   Yes Hector Iverson MD   pantoprazole (PROTONIX) 20 MG EC tablet Take 20 mg by mouth 2 (two) times a day.   Yes Hector Iverson MD   promethazine (PHENERGAN) 25 MG tablet Take 25 mg by mouth Every 8 (Eight) Hours As Needed for Nausea or Vomiting.   Yes Hector Iverson MD   rOPINIRole (REQUIP) 0.25 MG tablet Take 0.25 mg by mouth Every Night. Take 1 hour before bedtime.   Yes Hector Iverson MD       Allergies:    Allergies   Allergen Reactions   • Butorphanol Hives   • Codeine    • Contrast Dye      IV CONTRAST   • Golytely [Peg 3350-Electrolytes] Unknown - Low Severity   • Haldol [Haloperidol] Hives   • Morphine And Related    • Sulfa Antibiotics    • Toradol [Ketorolac Tromethamine]    • Vancomycin Hives       Objective   Objective     Vital Signs  Temp:  [97.5 °F (36.4 °C)-99.1 °F (37.3 °C)] 98.4 °F (36.9 °C)  Heart Rate:  [104-113] 106  Resp:  [18-26] 18  BP: (151-180)/() 180/116  SpO2:  [86 %-99 %] 99 %  on  Flow (L/min):  [1] 1;   Device (Oxygen Therapy): room air  Body mass index is 22.72 kg/m².    Physical Exam   Constitutional: She is oriented to person, place, and time. She appears well-developed and well-nourished.   HENT:   Head: Normocephalic and atraumatic.   Right Ear: External ear normal.   Left Ear: External ear normal.   Nose: Nose normal.   Mouth/Throat: Oropharynx is clear and moist.   Eyes: Pupils are equal, round, and reactive to light. Conjunctivae and EOM are normal.   Neck: Normal range of motion.   Cardiovascular: Normal rate, regular rhythm and normal heart sounds.   S1, S2 audible   Pulmonary/Chest: Effort normal and breath sounds normal.   On room air    Abdominal: Soft. Bowel sounds  are normal. There is tenderness.   Generalized abdominal tenderness   Musculoskeletal: Normal range of motion.   Neurological: She is alert and oriented to person, place, and time.   Slow to respond    Skin: Skin is warm and dry.   Psychiatric: She has a normal mood and affect. Her behavior is normal. Judgment and thought content normal.   Vitals reviewed.      Results Review:  I have personally reviewed most recent cardiac tracings, lab results and radiology images and interpretations and agree with finding.    Results from last 7 days   Lab Units 09/10/20  0457  09/08/20  0644   WBC 10*3/mm3 5.60   < > 5.80   HEMOGLOBIN g/dL 10.7*   < > 10.5*   HEMATOCRIT % 32.3*   < > 33.0*   PLATELETS 10*3/mm3 165   < > 156   INR   --   --  1.01    < > = values in this interval not displayed.     Results from last 7 days   Lab Units 09/10/20  0457  09/08/20  0644 09/07/20  0448 09/06/20  1906   SODIUM mmol/L 139   < > 142 142 143   POTASSIUM mmol/L 4.1   < > 6.3* 4.8 5.7*   CHLORIDE mmol/L 98   < > 101 102 101   CO2 mmol/L 24.0   < > 18.0* 25.0 25.0   BUN  27*   < > 67* 47* 43*   CREATININE mg/dL 6.12*   < > 9.32* 7.78* 7.27*   GLUCOSE mg/dL 116*   < > 73 90 100*   CALCIUM mg/dL 9.2   < > 9.3 8.6 8.8   ALT (SGPT) U/L  --   --  <5  --  <5   AST (SGOT) U/L  --   --  9  --  10   TROPONIN T ng/mL  --   --   --  0.035* 0.034*    < > = values in this interval not displayed.     Estimated Creatinine Clearance: 12.2 mL/min (A) (by C-G formula based on SCr of 6.12 mg/dL (H)).  Brief Urine Lab Results  (Last result in the past 365 days)      Color   Clarity   Blood   Leuk Est   Nitrite   Protein   CREAT   Urine HCG        09/06/20 2101 Yellow Clear Moderate (2+) Large (3+) Negative >=300 mg/dL (3+)               Microbiology Results (last 10 days)     Procedure Component Value - Date/Time    Blood Culture - Blood, Blood, Central Line [[id number]] Collected:  09/07/20 0448    Lab Status:  Preliminary result Specimen:  Blood, Central Line  Updated:  09/10/20 0500     Blood Culture No growth at 3 days    Blood Culture - Blood, Blood, Central Line [[id number]] Collected:  09/06/20 2351    Lab Status:  Preliminary result Specimen:  Blood, Central Line Updated:  09/10/20 0030     Blood Culture No growth at 3 days    MRSA Screen, PCR (Inpatient) - Swab, Nares [[id number]]  (Normal) Collected:  09/06/20 2350    Lab Status:  Final result Specimen:  Swab from Nares Updated:  09/07/20 0256     MRSA PCR No MRSA Detected    Blood Culture - Blood, Hand, Left [[id number]] Collected:  09/06/20 2349    Lab Status:  Preliminary result Specimen:  Blood from Hand, Left Updated:  09/10/20 0045     Blood Culture No growth at 3 days    COVID-19,[facility] IN-HOUSE,Nasal Swab No Transport Media 3-4 HR TAT - Swab, Nasal Cavity [[id number]]  (Normal) Collected:  09/06/20 2148    Lab Status:  Final result Specimen:  Swab from Nasal Cavity Updated:  09/06/20 2226     COVID19 Not Detected    Narrative:       Fact sheet for providers: https://www.fda.gov/media/329750/download     Fact sheet for patients: https://www.fda.gov/media/491541/download          ECG/EMG Results (most recent)     Procedure Component Value Units Date/Time    ECG 12 Lead [[id number]] Collected:  09/06/20 1847     Updated:  09/07/20 1422    Narrative:       HEART RATE= 104  bpm  RR Interval= 580  ms  HI Interval= 159  ms  P Horizontal Axis= 51  deg  P Front Axis= 33  deg  QRSD Interval= 97  ms  QT Interval= 359  ms  QRS Axis= 0  deg  T Wave Axis= 90  deg  - ABNORMAL ECG -  Sinus tachycardia  Abnormal T, consider ischemia, lateral leads  ST elev, probable normal early repol pattern  No previous ECG available for comparison  Electronically Signed By: Tee Cotter (CHANTEL) 07-Sep-2020 14:05:51  Date and Time of Study: 2020-09-06 18:47:43                    Ct Head Without Contrast    Result Date: 9/6/2020  1. No acute intracranial abnormality. 2. Changes of prior right frontal craniotomy. Electronically signed by:  Yohannes  PETRA Guerrero  9/6/2020 6:29 PM    Xr Chest 1 View    Result Date: 9/7/2020  1.Right subclavian catheter tip now projects at the cavoatrial junction. Left IJ port catheter tip also appears to be an area of the cavoatrial junction. 2.Stable cardiomegaly. 3.No significant interval change in diffuse interstitial and bibasilar airspace opacities. No pneumothorax is seen.  Electronically Signed By-DR. Mani Leo MD On:9/7/2020 11:34 AM This report was finalized on [id number] by DR. Mani Leo MD.    Xr Chest 1 View    Result Date: 9/7/2020  1.Right subclavian catheter projects in the right atrium. Catheter could be withdrawn approximately 6 cm to be in the area of the cavoatrial junction on this exam. 2.Left IJ port catheter terminates in the superior right atrium. 3.No pneumothorax is seen. 4.Cardiomegaly. 5.Diffuse interstitial and bibasilar airspace opacities which could indicate edema or pneumonia.  Electronically Signed By-DR. Mani Leo MD On:9/7/2020 8:02 AM This report was finalized on [id number] by DR. Mani Leo MD.        Estimated Creatinine Clearance: 12.2 mL/min (A) (by C-G formula based on SCr of 6.12 mg/dL (H)).    Assessment/Plan   Assessment/Plan       Active Hospital Problems    Diagnosis  POA   • **Seizure (CMS/HCC) [R56.9]  Yes     Priority: High   • Pneumonia [J18.9]  Yes     Priority: High   • Hypertensive emergency [I16.1]  Yes     Priority: High   • Acute encephalopathy [G93.40]  Yes     Priority: High   • GERD (gastroesophageal reflux disease) [K21.9]  Yes   • Hyperparathyroidism (CMS/HCC) [E21.3]  Yes   • Benign essential HTN [I10]  Yes   • (HFpEF) heart failure with preserved ejection fraction (CMS/HCC) [I50.30]  Yes   • Depression [F32.9]  Yes   • HTN (hypertension) [I10]  Yes   • Hyperphosphatemia [E83.39]  Yes   • Chronic diastolic (congestive) heart failure (CMS/HCC) [I50.32]  Yes   • Tobacco abuse [Z72.0]  Yes   • Anxiety [F41.9]  Yes   • End stage renal failure on  dialysis (CMS/[facility]) [N18.6, Z99.2]  Not Applicable      Resolved Hospital Problems   No resolved problems to display.     Seizure   - Patient has history and on keppra and vimpat on outpatient  -EEG if no improvement in mental status   -CK level 52  -Continue Keppra 500 mg IV bid and Vimapt 100 mg IV BID and switch to oral when appropriate  -Seizure precautions   -Neurology following-question on whether patient is compliant on medications    Hypertensive emergency-improving  -Monitor blood pressure  - Continue clonidine patch      Pneumonia  - Possible aspiration due to seizure   -Given Cefepime and Clindamycin given h/o seizures. On Unasyn.  -COVID19 swab Negative  -RVP ordered  -Urine antigen for Strep and Legionella pending  -MRSA nasal swab Negative  -Pulmonology following     Acute encephalopathy- Improving  - Slow to respond, currently on alert and oriented X3   -CT head negative for acute findings  - postictal state vs hypertensive encephalopathy  -ASA normal   -APAP < 5  - Neurology following      ESRD with HD dependence  - CR 6.1, BUN 27- monitor   -Nephrology following   -HD planned for today     Anemia of chronic disease   -Hemoglobin 10.6 on admission  -No signs of active bleeding  -EGD 07/14/20 at [facility] without acute findings    HFpEF  - EF unknown   - Reported in patient medical chart     Hyperparathyroidism  -Continue Sensipar and Calcitriol per renal     Hyperphosphatemia  -Continue phos binders per renal     Anxiety/depression  -Continue Lexapro, elavil, and Requip     GERD   - Continue PPI     Tobacco abuse  - Encourage cessation       VTE Prophylaxis -   Mechanical Order History:      Ordered        09/06/20 2149  Place Sequential Compression Device  Once         09/06/20 2149  Maintain Sequential Compression Device  Continuous                 Pharmalogical Order History:     Ordered     Dose Route Frequency Stop    09/06/20 2149  heparin (porcine) 5000 UNIT/ML injection 5,000 Units      5,000  Units SC Every 8 Hours Scheduled --          CODE STATUS:    Code Status and Medical Interventions:   Ordered at: 09/06/20 2149     Code Status:    CPR     Medical Interventions (Level of Support Prior to Arrest):    Full       This patient has been examined wearing appropriate Personal Protective Equipment . 09/10/20      I discussed the patient's findings and my recommendations with patient and nursing staff.        Electronically signed by NAIN Posey, 09/10/20, 2:43 PM.  [facility] Hospitalist Team    Hospitalist/ Attending note.  Patient seen and examined, chart reviewed.  Agree with above.  45-year-old female noted to ICU with seizure-like activity requiring Ativan and Keppra.  Now getting transferred out of ICU and we have been asked to see the patient for medical management of chronic medical issues.    Vitals reviewed  Chest, bilateral entry, normal vesicular.  Cardiovascular, S1-S2 there is no murmur  Abdomen soft nontender good times a day  CNS grossly intact there is no focal neurologic deficit present    Impression  Seizure  End-stage renal disease  CHF  Hypertension    Plan  Agree with above  Neurology on board    Danni Amaral MD.

## 2020-09-10 NOTE — PROGRESS NOTES
"   LOS: 4 days    Patient Care Team:  Danial Landeros MD as PCP - General (Family Medicine)  Dewey Jack MD as PCP - Claims Attributed      Subjective     Patient was seen this morning  She is feeling tired.  No acute respiratory stress overnight  As per nursing report, she has been delusional    Objective     Vital Sign Min/Max for last 24 hours  Temp:  [97.5 °F (36.4 °C)-99.7 °F (37.6 °C)] 98.5 °F (36.9 °C)  Heart Rate:  [105-115] 108  BP: (132-171)/() 153/116                       Flowsheet Rows      First Filed Value   Admission Height  170.2 cm (67\") Documented at 09/06/2020 1756   Admission Weight  81.6 kg (180 lb) Documented at 09/06/2020 1756          No intake/output data recorded.  I/O last 3 completed shifts:  In: 1112 [I.V.:1112]  Out: -     Physical Exam:  Physical Exam    General.  Awake, alert  Head.  Atraumatic, normocephalic  Neck.  Supple  Respiratory.  Clear to auscultation  Cardiovascular.  Regular rhythm  Abdominal.  Obese, soft, Mild generalized tenderness  Extremities.  Trace edema       LABS:  Lab Results   Component Value Date    CALCIUM 9.2 09/10/2020    PHOS 5.0 (H) 09/10/2020     Results from last 7 days   Lab Units 09/10/20  0457 09/09/20  0436 09/08/20  1119 09/08/20  0644  09/06/20  1906   MAGNESIUM mg/dL 2.1 2.1  --  2.6   < >  --    SODIUM mmol/L 139 139  --  142   < > 143   POTASSIUM mmol/L 4.1 3.9 6.1* 6.3*   < > 5.7*   CHLORIDE mmol/L 98 95*  --  101   < > 101   CO2 mmol/L 24.0 27.0  --  18.0*   < > 25.0   BUN  27* 17  --  67*   < > 43*   CREATININE mg/dL 6.12* 4.55*  --  9.32*   < > 7.27*   GLUCOSE mg/dL 116* 96  --  73   < > 100*   CALCIUM mg/dL 9.2 9.3  --  9.3   < > 8.8   WBC 10*3/mm3 5.60 5.60  --  5.80   < > 8.00   HEMOGLOBIN g/dL 10.7* 10.1*  --  10.5*   < > 10.6*   PLATELETS 10*3/mm3 165 157  --  156   < > 147   ALT (SGPT) U/L  --   --   --  <5  --  <5   AST (SGOT) U/L  --   --   --  9  --  10    < > = values in this interval not displayed.     Lab Results "   Component Value Date    CKTOTAL 52 09/06/2020    TROPONINI 0.027 07/12/2020    TROPONINT 0.035 (C) 09/07/2020     Estimated Creatinine Clearance: 12.2 mL/min (A) (by C-G formula based on SCr of 6.12 mg/dL (H)).      Brief Urine Lab Results  (Last result in the past 365 days)      Color   Clarity   Blood   Leuk Est   Nitrite   Protein   CREAT   Urine HCG        09/06/20 2101 Yellow Clear Moderate (2+) Large (3+) Negative >=300 mg/dL (3+)             WEIGHTS:     Wt Readings from Last 1 Encounters:   09/10/20 0551 65.8 kg (145 lb 1 oz)   09/09/20 0400 65.7 kg (144 lb 13.5 oz)   09/08/20 0400 66.6 kg (146 lb 13.2 oz)   09/07/20 0400 65.5 kg (144 lb 6.4 oz)   09/06/20 2210 65.5 kg (144 lb 6.4 oz)   09/06/20 1756 81.6 kg (180 lb)         amitriptyline 25 mg Oral Nightly   ampicillin-sulbactam 1.5 g Intravenous Q12H   epoetin jaskaran/jaskaran-epbx 5,000 Units Intravenous Once   escitalopram 20 mg Oral Daily   gabapentin 200 mg Oral TID   heparin (porcine) 5,000 Units Subcutaneous Q8H   insulin lispro 0-7 Units Subcutaneous Q6H   lacosamide (VIMPAT) IVPB 100 mg Intravenous Q12H   levETIRAcetam 500 mg Intravenous Q12H   pantoprazole 40 mg Oral Q AM   rOPINIRole 0.25 mg Oral Nightly   sodium chloride 10 mL Intravenous Q12H       dextrose 35 mL/hr Last Rate: 35 mL/hr (09/10/20 0937)       Assessment/Plan     1.  ESRD  Hemodialysis Today  Electrolytes, volume status okay    2.  Hypertension with ESRD  Off Cardene drip  Blood pressure running high.  Patient has been refusing oral Medication per nursing report.  I will start clonidine patch    3.  Anemia with ESRD  Epogen with dialysis    4.  Seizures  Neurology following    Juanjose Tello MD  09/10/20  10:33

## 2020-09-10 NOTE — CONSULTS
"Nutrition Services    Patient Name:  Maura Garibay  YOB: 1975  MRN: [id number]  Admit Date:  9/6/2020    Comments:     Today is day #4 that patient is refusing to eat. Starting Novasource Renal shakes TID.     If mentation does not improve tomorrow & pt not accepting of ONS, consider bridling DHT for nutrition & having sitter present.     *This assessment completed remotely with assistance from nursing communication and EMR documentation    Reason for Assessment                Reason for Assessment    Reason For Assessment 9/10: MST 2        Diagnosis H&P:     Maura Garibay is a 45 y.o. female with a PMH significant for ESRD on HD MWF, seizures, HTN, GERD, hyperparathyroidism, IBS, anemia, HFpEF, depression, TTP, anxiety/depression & tobacco abuse who presented to the ER on 9/6/2020 after being found by her neighbor shaking with seizure like activity.     Current Problems:     Seizure  - Hx of seizures  - ? Compliance with medications     Hypertensive emergency  - off cardene gtt    PNA  - ? Aspiration   - Antibotics     Encephalopathy  - CT - of acute findings  - Neuro to see  - Possible Psych Eval            Nutrition/Diet History                Nutrition/Diet History    Narrative     9/10: Called RN today to speak about the patient's nutritional status. RN confirms pt is refusing to eat, however her mentation is slightly improved & he is hopeful the trend will continue.     Informed him of encouraging Novasource renal shakes. If tomorrow no improvement, consider bridling DHT for nutrition & have sitter present.         Functional Status Uncertain of pt's functional baseline. Per flowsheets is dependent for ambulation & eating. Per PT's note difficult to move pt given unstable BP, pt is impulsive & easily distracted.        Food Allergies  NKFA        Factors Affecting Nutritional Intake  Altered Mentation    ESRD          Anthropometrics             Anthropometrics    Height 170.2 cm (67\")     " "Weight 65.8 kg (145 lb)    9/10/20        Admit Weight    Admit Weight 65.5 kg (144 lb)    9/6/20        Ideal Body Weight (IBW)    Ideal Body Weight (IBW) 146 lb     % Ideal Body Weight 99%        Usual Body Weight (UBW)    Usual Body Weight UTD    % Usual Body Weight UTD     Weight Hx  130 lb (7/7/16)        Body Mass Index (BMI)    BMI (kg/m2) 22.72            Labs/Medications                Labs    Pertinent Lab Results Comments Gluc 116 H/107 H, Na 139, K+ 4.1, Crt 6.12 H, BUN 27 H, Ca 9/2, Alb 3.9, Phos 5.0 H, Mg 2.1, Hgb 10.7 L, Hct 32.3 L         Medications    Pertinent Medications Comments Unasyn, Retacrit, Lexapro, Neurontin, Humalog, Vimpat, Keppra, Protonix, D10 at 35ml/hr,          Physical Findings                Physical Findings    Overall Physical Appearance Unable to observe r/t limiting face-to-face contact in the context of the COVID19 pandemic     Edema  1+ generalized edema      Gastrointestinal + BM 9/9      Tubes None      Oral/Mouth Cavity Evaluated by ST on 9/9, and pt started on oral diet      Skin Undefined wounds to L scapula & L upper back          Estimated/Assessed Needs              Calorie Requirements     Height Used for Calorie Calculations  67\"      Weight Used for Calorie Calculations 65.8 kg      Formula Chosen for Calorie Calculations  30 kcals/kg      Estimated Calorie Requirements (kcals/day) 1974 kcals              Protein Requirements    Weight Used For Protein Calculations 65.8 kg       Est Protein Requirement Amount (gms/kg) 1.5 gm/kg       Estimated Protein Requirements (gms/day) 99 gm          Fluid Requirements     Estimated Fluid Requirements (mL/day) 1000 ml + urine output r/t ESRD            Fluid Deficit       Current Sodium Level (mEq/L)      Desired Sodium Level (mEq/L)      Estimated Fluid Deficit Needs (L)           Nutrition Prescription Ordered                Nutrition Prescription PO    Current PO Diet  Soft to chew diet      Supplement  None         " Nutrition Prescription EN    Enteral Route -     TF Modular -     TF Delivery Method -     Current Ordered TF  -     Current Ordered Water flush  -     TF Observation  -        Nutrition Prescription TPN    TPN Route -     Current Ordered TPN Volume  -     Dextrose (gm/kcals)  -     Amino Acid (gm/kcals) -     Lipids (mL/Concentration/Frequency)  -     MVI Frequency  -     Trace Element Frequency  -     TPN Observation  -          Evaluation of Received Nutrient/Fluid Intake                PO Evaluation    % PO Intake 9/10: 0% of meals. Noted per nursing documentation pt is refusing to eat & spitting out food on the floor.         EN Evaluation    TF Changes -     TF Residual -     TF Tolerance -     Average EN Delivered  -        TPN Evaluation    Total Number of Days on TPN  -           Clinical Course      Clinical Nutrition Course Details  PO diet  9/6-9/8 NPO  9/9 to present (9/10) Soft to chew          Problem/Interventions:                     Nutrition Diagnoses Problem 1      Problem 1   Inadequate oral intakes r/t encephalopathy AEB 0% meal intakes x 4 days of admission.      Nutrition Diagnoses Problem 2      Problem 2            Intervention Goal                Intervention Goal    General Mentation to improve & PO intakes to > 50% of meals     Otherwise consider placement of DHT for nutrition support, Could bridled DHT & have sitter present          Nutrition Intervention                Nutrition Intervention    RD/Tech Action Starting ONS, uncertain given pt's mental status if she will be accepting          Nutrition Prescription                Nutrition Prescription PO      Diet  Soft to chew      Supplement Novasource Renal TID         Nutrition Prescription EN    Enteral Prescription IF TF needed recommend to start conservatively given prolonged 0% PO Intakes:    NR at 20 ml/hr, adjust water flush based on clinical picture     END goal:  Novasource Renal at 45ml/hr (x 22 hrs) which provides 1980  kcals (100%), 90 gm (91%), and 713 ml free water. Adjust water flush based on clinical picture.    TF formula chosen given ESRD, need for fluid & electrolyte management. Noted very recent Hyperkalemia & current hyperphosphatemia.         Nutrition Prescription TPN    TPN Prescription -         Monitor/Evaluation                Monitor/Evaluation    Monitor Intakes vs need for TF, labs, BM, weight, skin, and medication changes            Electronically signed by:  Lisa Mcclendon RD  09/10/20 15:58

## 2020-09-10 NOTE — NURSING NOTE
"Pt. Still refusing to eat/take any PO fluids/food/medications even with daughter at bedside to help. Pt. Still delusional stating \"Im going to die\" and \"I want to see Domenico\".  "

## 2020-09-10 NOTE — PROGRESS NOTES
PULMONARY/ CRITICAL CARE PROGRESS NOTE        Patient Name:  Maura Garibay    :  1975    Medical Record:  [id number]    PRIMARY CARE PHYSICIAN     Danial Landeros MD [facility]  Maura Garibay is a 45 y.o. female with a PMH significant for ESRD on HD MWF, seizures, hypertension, GERD, hyperparathyroidism, IBS, anemia, hyperphosphatemia, HFpEF, depression, TTP, anxiety/depression and tobacco abuse who presented to the ER after being found by her neighbor shaking with seizure like activity.  EMS was called and when they arrived she was some jerking of her extremities.  Upon arrival to the ER she was evaluated and had seizure like activity and was given two doses of Ativan and a gram of Keppra.  Currently patient is unable to provide details regarding her medical history due to being obtunded.        :  No new issues.  No seizures since admitted.  Awakens with voice, but not oriented.  :  No issues overnight.  No seizure activity seen.  Awakens to voice and follows commands today.  On Cardene gtt   :  More awake today, but is confused and combative.  On D10 and Cardene gtt   9/10:  Off Cardene gtt.  Receiving HD today.  Remains with hypoglycemia and is on D10 gtt.  More awake, but confused.  Some delusions.  She is refusing treatments and medications      REVIEW OF SYSTEMS    Difficult to obtain due to mental status    HOME MEDICATIONS  Amitriptyline (ELAVIL) 25 MG tablet qhs    Aspirin 81 MG qday    AURYXIA 1  MG(Fe) tablet two tablets with meals    calcitriol (ROCALTROL) 0.25 MCG capsule 1 capsule by mouth 3 (three) times a week     cinacalcet (SENSIPAR) 30 MG tablet  by mouth 3 (three) times a week    cloNIDine (CATAPRES) 0.2 mg by mouth 3 (three) times daily     dilTIAZem ER beads (TIAZAC) 240 MG 24 hr capsule QD    diphenhydrAMINE (BENADRYL) 50 MG   every 4 (four) hours as needed for Itching    docusate sodium (COLACE) 100 MG capsule QD    escitalopram (LEXAPRO) 10 MG tablet  QD    esomeprazole (NEXIUM) 40 MG QD    gabapentin (NEURONTIN) 600 MG tablet mg 4 (four) times daily    hydrALAZINE (APRESOLINE) 100 MG tablet BID    lacosamide (VIMPAT) 100 mg BID    levETIRAcetam 500 mg BID    linaclotide (LINZESS) 290 MCG QD    metoclopramide 5 MG one tablet four times daily before meals and nightly    promethazine 25 mg by mouth every 6 hours as needed for nausea    Requip 0.25 MG tablet nightly    TRAZODONE HCL PO 50 mg nightly    MEDICAL HISTORY    Past Medical History:   Diagnosis Date   • (HFpEF) heart failure with preserved ejection fraction (CMS/HCC)    • Acid reflux    • Anxiety and depression    • Cardiomyopathy (CMS/HCC)    • Chronic pain    • Dependence on renal dialysis (CMS/HCC)    • Hyperparathyroidism (CMS/HCC)    • Hypertension    • IBS (irritable bowel syndrome)    • Migraines    • Neuropathy    • Pulmonary emboli (CMS/HCC)    • Renal failure    • Seizures (CMS/HCC)    • TTP (thrombotic thrombocytopenic purpura) (CMS/HCC)         SURGICAL HISTORY    Past Surgical History:   Procedure Laterality Date   • APPENDECTOMY     • ARTERIOVENOUS FISTULA     • BRAIN SURGERY      Fistula placed   • BRAIN SURGERY      Cyst   • CHOLECYSTECTOMY     • COLONOSCOPY     • HYSTERECTOMY     • SALPINGO OOPHORECTOMY     • TONSILLECTOMY          FAMILY HISTORY    Family History   Problem Relation Age of Onset   • Kidney disease Mother    • Hypertension Mother    • Heart disease Father    • Heart disease Sister    • Heart disease Brother         SOCIAL HISTORY    Social History     Socioeconomic History   • Marital status:      Spouse name: Not on file   • Number of children: Not on file   • Years of education: Not on file   • Highest education level: Not on file   Tobacco Use   • Smoking status: Current Every Day Smoker     Packs/day: 0.50   Substance and Sexual Activity   • Alcohol use: No   • Drug use: No        ALLERGIES    Allergies   Allergen Reactions   • Butorphanol Hives   • Codeine    •  Contrast Dye      IV CONTRAST   • Golytely [Peg 3350-Electrolytes] Unknown - Low Severity   • Haldol [Haloperidol] Hives   • Morphine And Related    • Sulfa Antibiotics    • Toradol [Ketorolac Tromethamine]    • Vancomycin Hives         PHYSICAL EXAM    tMax 24 hrs:  Temp (24hrs), Av.8 °F (37.1 °C), Min:97.5 °F (36.4 °C), Max:99.7 °F (37.6 °C)    Vitals Ranges:  Temp:  [97.5 °F (36.4 °C)-99.7 °F (37.6 °C)] 98.5 °F (36.9 °C)  Heart Rate:  [105-115] 109  BP: (132-159)/(86-99) 140/86  Intake and Output Last 3 Shifts:  I/O last 3 completed shifts:  In: 1112 [I.V.:1112]  Out: -     Constitutional:  NAD  HEENT:   Atraumatic, PERRL, conjunctiva normal, moist oral mucosa, no nasal discharge.  Trachea is midline.  Respiratory:   No respiratory distress, normal breath sounds, no rales, no wheezing   Cardiovascular:  Tachy. Pulses 2+ and equal in all four extremities.   HARSH fistula with positive thrill and bruit.    GI:   Soft, nondistended, positive bowel sounds.  Extremities:   No edema, cyanosis or tenderness.  Integument:   No rashes  Neurologic:  Awake, intermittently confused.  Some delusions.  Calm      LABS    Lab Results (last 24 hours)     Procedure Component Value Units Date/Time    BUN [[id number]]  (Abnormal) Collected:  09/10/20 0457    Specimen:  Blood Updated:  09/10/20 0535     BUN 27 mg/dL     Renal Function Panel [[id number]]  (Abnormal) Collected:  09/10/20 0457    Specimen:  Blood Updated:  09/10/20 0533     Glucose 116 mg/dL      BUN --     Comment: Testing performed by alternate method        Creatinine 6.12 mg/dL      Sodium 139 mmol/L      Potassium 4.1 mmol/L      Chloride 98 mmol/L      CO2 24.0 mmol/L      Calcium 9.2 mg/dL      Albumin 3.90 g/dL      Phosphorus 5.0 mg/dL      Anion Gap 17.0 mmol/L      BUN/Creatinine Ratio --     Comment: Testing not performed        eGFR Non African Amer 7 mL/min/1.73      Comment: <15 Indicative of kidney failure.        eGFR   Amer --     Comment: <15  Indicative of kidney failure.       Narrative:       GFR Normal >60  Chronic Kidney Disease <60  Kidney Failure <15      Magnesium [[id number]]  (Normal) Collected:  09/10/20 0457    Specimen:  Blood Updated:  09/10/20 0533     Magnesium 2.1 mg/dL     CBC & Differential [[id number]] Collected:  09/10/20 0457    Specimen:  Blood Updated:  09/10/20 0514    Narrative:       The following orders were created for panel order CBC & Differential.  Procedure                               Abnormality         Status                     ---------                               -----------         ------                     CBC Auto Differential[[id number]]        Abnormal            Final result                 Please view results for these tests on the individual orders.    CBC Auto Differential [[id number]]  (Abnormal) Collected:  09/10/20 0457    Specimen:  Blood Updated:  09/10/20 0514     WBC 5.60 10*3/mm3      RBC 3.47 10*6/mm3      Hemoglobin 10.7 g/dL      Hematocrit 32.3 %      MCV 93.3 fL      MCH 30.8 pg      MCHC 33.0 g/dL      RDW 18.0 %      RDW-SD 58.6 fl      MPV 8.9 fL      Platelets 165 10*3/mm3      Neutrophil % 77.5 %      Lymphocyte % 13.2 %      Monocyte % 8.4 %      Eosinophil % 0.5 %      Basophil % 0.4 %      Neutrophils, Absolute 4.30 10*3/mm3      Lymphocytes, Absolute 0.70 10*3/mm3      Monocytes, Absolute 0.50 10*3/mm3      Eosinophils, Absolute 0.00 10*3/mm3      Basophils, Absolute 0.00 10*3/mm3      nRBC 0.0 /100 WBC     Blood Culture - Blood, Blood, Central Line [[id number]] Collected:  09/07/20 0448    Specimen:  Blood, Central Line Updated:  09/10/20 0500     Blood Culture No growth at 3 days    Blood Culture - Blood, Hand, Left [[id number]] Collected:  09/06/20 2349    Specimen:  Blood from Hand, Left Updated:  09/10/20 0045     Blood Culture No growth at 3 days    Blood Culture - Blood, Blood, Central Line [[id number]] Collected:  09/06/20 2351    Specimen:  Blood, Central Line Updated:  09/10/20  0030     Blood Culture No growth at 3 days    POC Glucose Once [[id number]]  (Normal) Collected:  09/10/20 0018    Specimen:  Blood Updated:  09/10/20 0019     Glucose 73 mg/dL      Comment: Serial Number: [id number]:  [id number]       POC Glucose Once [[id number]]  (Normal) Collected:  09/09/20 1712    Specimen:  Blood Updated:  09/09/20 1713     Glucose 101 mg/dL      Comment: Serial Number: [id number]:  [id number]       POC Glucose Once [[id number]]  (Normal) Collected:  09/09/20 1134    Specimen:  Blood Updated:  09/09/20 1147     Glucose 101 mg/dL      Comment: Serial Number: [id number]:  [id number]             MICRO:  Microbiology Results (last 10 days)     Procedure Component Value - Date/Time    Blood Culture - Blood, Blood, Central Line [[id number]] Collected:  09/07/20 0448    Lab Status:  Preliminary result Specimen:  Blood, Central Line Updated:  09/10/20 0500     Blood Culture No growth at 3 days    Blood Culture - Blood, Blood, Central Line [[id number]] Collected:  09/06/20 2351    Lab Status:  Preliminary result Specimen:  Blood, Central Line Updated:  09/10/20 0030     Blood Culture No growth at 3 days    MRSA Screen, PCR (Inpatient) - Swab, Nares [[id number]]  (Normal) Collected:  09/06/20 2350    Lab Status:  Final result Specimen:  Swab from Nares Updated:  09/07/20 0256     MRSA PCR No MRSA Detected    Blood Culture - Blood, Hand, Left [[id number]] Collected:  09/06/20 2349    Lab Status:  Preliminary result Specimen:  Blood from Hand, Left Updated:  09/10/20 0045     Blood Culture No growth at 3 days    COVID-19,[facility] IN-HOUSE,Nasal Swab No Transport Media 3-4 HR TAT - Swab, Nasal Cavity [[id number]]  (Normal) Collected:  09/06/20 2148    Lab Status:  Final result Specimen:  Swab from Nasal Cavity Updated:  09/06/20 2226     COVID19 Not Detected    Narrative:       Fact sheet for providers: https://www.fda.gov/media/332637/download     Fact sheet for patients:  https://www.fda.gov/media/490404/download          IMAGING & OTHER STUDIES    Imaging Results (Last 72 Hours)     Procedure Component Value Units Date/Time    XR Chest 1 View [[id number]] Collected:  09/07/20 1131     Updated:  09/07/20 1136    Narrative:       DATE OF EXAM:  9/7/2020 11:08 AM     PROCEDURE:  XR CHEST 1 VW-     INDICATIONS:  central line; R56.9-Unspecified convulsions; I16.1-Hypertensive  emergency; N18.6-End stage renal disease; Z99.2-Dependence on renal  dialysis     COMPARISON:  09/06/2020     TECHNIQUE:   Single radiographic view of the chest was obtained.     FINDINGS:  Right subclavian catheter appears to have been withdrawn with tip now  projecting at the cavoatrial junction.  Left IJ port catheter also now  appears to be in the area of the cavoatrial junction.  No pneumothorax  is identified. The heart is enlarged. No significant interval change in  diffuse interstitial and bibasilar opacities. No definite effusion is  seen. There is right convex scoliosis of the upper thoracic spine.       Impression:       1.Right subclavian catheter tip now projects at the cavoatrial junction.  Left IJ port catheter tip also appears to be an area of the cavoatrial  junction.  2.Stable cardiomegaly.  3.No significant interval change in diffuse interstitial and bibasilar  airspace opacities. No pneumothorax is seen.     Electronically Signed By-DR. Mani Leo MD On:9/7/2020 11:34 AM  This report was finalized on [id number] by DR. Mani Leo MD.            ASSESSMENT/PLAN  Seizure (CMS/HCC)  -Patient with history of same  -Keppra and Vimpat at outpatient  -?compliance  -Keppra 500 mg IV bid and Vimapt 100 mg IV BID and switch to oral when appropriate  -EEG if no improvement in mental status   -CK level 52    Hypertensive emergency  -/129 on admission  -Cardene gtt off  -Clonidine patch added by renal    Pneumonia  -? aspiration  -Given Cefepime and Clindamycin given h/o seizures. On  Unasyn.  -COVID19 swab Negative  -RVP ordered  -Urine antigen for Strep and Legionella pending  -MRSA nasal swab Negative    Encephalopathy  -CT head negative for acute findings  -? postictal state vs hypertensive encephalopathy  -Utox, ASA pending  -APAP < 5  -ask neuro to see her   -possible need for a psych eval    ESRD with HD dependence  -Will consult Dr. Tello = sees Dr. Rod Ch  -HD today    Anemia  -Hemoglobin 10.6 on admission  -No signs of active bleeding  -EGD 07/14/20 at [facility] without acute findings    Hyperparathyroidism  -Continue Sensipar and Calcitriol per renal    Hyperphosphatemia  -Continue phos binders per renal    Anxiety/depression  -Continue Lexapro and Requip      PUD: Protonix  Insulin:   Sliding scale  VTE:   SCDs/  Lovenox  Nutrition:  Diet ordered       Transfer out of ICU

## 2020-09-10 NOTE — PROGRESS NOTES
Continued Stay Note  MATA Rojas     Patient Name: Maura Garibay  MRN: [id number]  Today's Date: 9/10/2020    Admit Date: 9/6/2020    Discharge Plan     Row Name 09/10/20 1051       Plan    Plan  D/C Plan : PT is recommedning rehab . Pt is confused and daughter unwilling to make choices for her mom . I will get choices from pt when she is more alert.     Patient/Family in Agreement with Plan  yes    Plan Comments  Pt is confused , I spoke with pt's daughter and pt does live in [place] and does her dialysis on [facility]  in [place] . Daughter states that pt was recently at [facility] and refused to go to rehab .         Discharge Codes    No documentation.       Expected Discharge Date and Time     Expected Discharge Date Expected Discharge Time    Sep 11, 2020             Argentina Mireles RN

## 2020-09-10 NOTE — PLAN OF CARE
Attempted to see pt this date to assess diet tolerance. Family and nursing staff at bedside. Attempted to give pt trials of pudding and lemonade by straw. When presented with trials, pt would refuse and shake her head. Spoke with pt's RN before and after attempt who endorsed refusal behaviors. ST will continue to follow and make further recs as indicated. PPE worn: gloves, mask, and glasses

## 2020-09-11 LAB
ALBUMIN SERPL-MCNC: 3.6 G/DL (ref 3.5–5.2)
ALBUMIN/GLOB SERPL: 1.2 G/DL
ALP SERPL-CCNC: 270 U/L (ref 39–117)
ALT SERPL W P-5'-P-CCNC: <5 U/L (ref 1–33)
ANION GAP SERPL CALCULATED.3IONS-SCNC: 15 MMOL/L (ref 5–15)
APTT PPP: 30.1 SECONDS (ref 24–31)
AST SERPL-CCNC: 14 U/L (ref 1–32)
BASOPHILS # BLD AUTO: 0 10*3/MM3 (ref 0–0.2)
BASOPHILS NFR BLD AUTO: 0.4 % (ref 0–1.5)
BILIRUB SERPL-MCNC: 0.5 MG/DL (ref 0–1.2)
BUN SERPL-MCNC: 13 MG/DL (ref 6–20)
BUN SERPL-MCNC: ABNORMAL MG/DL
BUN/CREAT SERPL: ABNORMAL
CALCIUM SPEC-SCNC: 9.1 MG/DL (ref 8.6–10.5)
CHLORIDE SERPL-SCNC: 93 MMOL/L (ref 98–107)
CO2 SERPL-SCNC: 28 MMOL/L (ref 22–29)
CREAT SERPL-MCNC: 3.87 MG/DL (ref 0.57–1)
DEPRECATED RDW RBC AUTO: 56.4 FL (ref 37–54)
EOSINOPHIL # BLD AUTO: 0.1 10*3/MM3 (ref 0–0.4)
EOSINOPHIL NFR BLD AUTO: 1.8 % (ref 0.3–6.2)
ERYTHROCYTE [DISTWIDTH] IN BLOOD BY AUTOMATED COUNT: 17.6 % (ref 12.3–15.4)
GFR SERPL CREATININE-BSD FRML MDRD: 13 ML/MIN/1.73
GFR SERPL CREATININE-BSD FRML MDRD: ABNORMAL ML/MIN/{1.73_M2}
GLOBULIN UR ELPH-MCNC: 2.9 GM/DL
GLUCOSE BLDC GLUCOMTR-MCNC: 146 MG/DL (ref 70–105)
GLUCOSE BLDC GLUCOMTR-MCNC: 72 MG/DL (ref 70–105)
GLUCOSE BLDC GLUCOMTR-MCNC: 82 MG/DL (ref 70–105)
GLUCOSE BLDC GLUCOMTR-MCNC: 83 MG/DL (ref 70–105)
GLUCOSE BLDC GLUCOMTR-MCNC: 90 MG/DL (ref 70–105)
GLUCOSE SERPL-MCNC: 109 MG/DL (ref 65–99)
HCT VFR BLD AUTO: 31.1 % (ref 34–46.6)
HGB BLD-MCNC: 10.2 G/DL (ref 12–15.9)
INR PPP: 1.16 (ref 0.93–1.1)
LYMPHOCYTES # BLD AUTO: 0.9 10*3/MM3 (ref 0.7–3.1)
LYMPHOCYTES NFR BLD AUTO: 17 % (ref 19.6–45.3)
MAGNESIUM SERPL-MCNC: 1.9 MG/DL (ref 1.6–2.6)
MCH RBC QN AUTO: 30.2 PG (ref 26.6–33)
MCHC RBC AUTO-ENTMCNC: 32.8 G/DL (ref 31.5–35.7)
MCV RBC AUTO: 91.9 FL (ref 79–97)
MONOCYTES # BLD AUTO: 0.4 10*3/MM3 (ref 0.1–0.9)
MONOCYTES NFR BLD AUTO: 8.4 % (ref 5–12)
NEUTROPHILS NFR BLD AUTO: 3.7 10*3/MM3 (ref 1.7–7)
NEUTROPHILS NFR BLD AUTO: 72.4 % (ref 42.7–76)
NRBC BLD AUTO-RTO: 0.1 /100 WBC (ref 0–0.2)
PHOSPHATE SERPL-MCNC: 4.3 MG/DL (ref 2.5–4.5)
PLATELET # BLD AUTO: 161 10*3/MM3 (ref 140–450)
PMV BLD AUTO: 9.1 FL (ref 6–12)
POTASSIUM SERPL-SCNC: 3.7 MMOL/L (ref 3.5–5.2)
PROT SERPL-MCNC: 6.5 G/DL (ref 6–8.5)
PROTHROMBIN TIME: 12.5 SECONDS (ref 9.6–11.7)
RBC # BLD AUTO: 3.39 10*6/MM3 (ref 3.77–5.28)
SODIUM SERPL-SCNC: 136 MMOL/L (ref 136–145)
WBC # BLD AUTO: 5 10*3/MM3 (ref 3.4–10.8)

## 2020-09-11 PROCEDURE — 92526 ORAL FUNCTION THERAPY: CPT

## 2020-09-11 PROCEDURE — 80053 COMPREHEN METABOLIC PANEL: CPT | Performed by: INTERNAL MEDICINE

## 2020-09-11 PROCEDURE — 83735 ASSAY OF MAGNESIUM: CPT | Performed by: INTERNAL MEDICINE

## 2020-09-11 PROCEDURE — 85730 THROMBOPLASTIN TIME PARTIAL: CPT | Performed by: INTERNAL MEDICINE

## 2020-09-11 PROCEDURE — 82962 GLUCOSE BLOOD TEST: CPT

## 2020-09-11 PROCEDURE — 25010000003 AMPICILLIN-SULBACTAM PER 1.5 G: Performed by: INTERNAL MEDICINE

## 2020-09-11 PROCEDURE — 25010000002 HEPARIN (PORCINE) PER 1000 UNITS: Performed by: INTERNAL MEDICINE

## 2020-09-11 PROCEDURE — 25010000002 ONDANSETRON PER 1 MG: Performed by: INTERNAL MEDICINE

## 2020-09-11 PROCEDURE — 99222 1ST HOSP IP/OBS MODERATE 55: CPT | Performed by: PSYCHIATRY & NEUROLOGY

## 2020-09-11 PROCEDURE — 25010000002 LEVETIRACETAM IN NACL 0.82% 500 MG/100ML SOLUTION: Performed by: INTERNAL MEDICINE

## 2020-09-11 PROCEDURE — 85610 PROTHROMBIN TIME: CPT | Performed by: INTERNAL MEDICINE

## 2020-09-11 PROCEDURE — 85025 COMPLETE CBC W/AUTO DIFF WBC: CPT | Performed by: INTERNAL MEDICINE

## 2020-09-11 PROCEDURE — 99232 SBSQ HOSP IP/OBS MODERATE 35: CPT | Performed by: HOSPITALIST

## 2020-09-11 PROCEDURE — 84100 ASSAY OF PHOSPHORUS: CPT | Performed by: INTERNAL MEDICINE

## 2020-09-11 PROCEDURE — 25010000002 HYDRALAZINE PER 20 MG: Performed by: HOSPITALIST

## 2020-09-11 RX ORDER — LOSARTAN POTASSIUM 50 MG/1
100 TABLET ORAL
Status: DISCONTINUED | OUTPATIENT
Start: 2020-09-12 | End: 2020-09-13 | Stop reason: HOSPADM

## 2020-09-11 RX ORDER — AMLODIPINE BESYLATE 5 MG/1
10 TABLET ORAL
Status: DISCONTINUED | OUTPATIENT
Start: 2020-09-11 | End: 2020-09-13 | Stop reason: HOSPADM

## 2020-09-11 RX ORDER — CEFDINIR 125 MG/5ML
125 POWDER, FOR SUSPENSION ORAL DAILY
Status: DISCONTINUED | OUTPATIENT
Start: 2020-09-11 | End: 2020-09-11

## 2020-09-11 RX ORDER — CEFDINIR 125 MG/5ML
300 POWDER, FOR SUSPENSION ORAL ONCE
Status: COMPLETED | OUTPATIENT
Start: 2020-09-11 | End: 2020-09-11

## 2020-09-11 RX ORDER — CEFDINIR 125 MG/5ML
125 POWDER, FOR SUSPENSION ORAL DAILY
Qty: 20 ML | Refills: 0 | Status: SHIPPED | OUTPATIENT
Start: 2020-09-11 | End: 2020-09-15

## 2020-09-11 RX ORDER — HYDRALAZINE HYDROCHLORIDE 25 MG/1
50 TABLET, FILM COATED ORAL EVERY 6 HOURS PRN
Status: DISCONTINUED | OUTPATIENT
Start: 2020-09-11 | End: 2020-09-13 | Stop reason: HOSPADM

## 2020-09-11 RX ORDER — HYDRALAZINE HYDROCHLORIDE 25 MG/1
25 TABLET, FILM COATED ORAL EVERY 8 HOURS SCHEDULED
Status: DISCONTINUED | OUTPATIENT
Start: 2020-09-11 | End: 2020-09-11

## 2020-09-11 RX ORDER — CEFDINIR 125 MG/5ML
300 POWDER, FOR SUSPENSION ORAL
Status: DISCONTINUED | OUTPATIENT
Start: 2020-09-12 | End: 2020-09-13 | Stop reason: HOSPADM

## 2020-09-11 RX ORDER — LEVETIRACETAM 500 MG/1
500 TABLET ORAL 2 TIMES DAILY
Qty: 60 TABLET | Refills: 0 | Status: SHIPPED | OUTPATIENT
Start: 2020-09-11

## 2020-09-11 RX ORDER — RISPERIDONE 0.25 MG/1
0.25 TABLET ORAL 2 TIMES DAILY PRN
Status: DISCONTINUED | OUTPATIENT
Start: 2020-09-11 | End: 2020-09-13 | Stop reason: HOSPADM

## 2020-09-11 RX ORDER — LOPERAMIDE HYDROCHLORIDE 2 MG/1
2 CAPSULE ORAL 4 TIMES DAILY PRN
Status: DISCONTINUED | OUTPATIENT
Start: 2020-09-11 | End: 2020-09-13 | Stop reason: HOSPADM

## 2020-09-11 RX ORDER — LOSARTAN POTASSIUM 25 MG/1
25 TABLET ORAL
Status: DISCONTINUED | OUTPATIENT
Start: 2020-09-11 | End: 2020-09-11

## 2020-09-11 RX ORDER — HYDRALAZINE HYDROCHLORIDE 20 MG/ML
10 INJECTION INTRAMUSCULAR; INTRAVENOUS ONCE
Status: COMPLETED | OUTPATIENT
Start: 2020-09-11 | End: 2020-09-11

## 2020-09-11 RX ORDER — LACOSAMIDE 100 MG/1
100 TABLET ORAL EVERY 12 HOURS SCHEDULED
Qty: 60 TABLET | Refills: 0 | Status: SHIPPED | OUTPATIENT
Start: 2020-09-11

## 2020-09-11 RX ORDER — AMLODIPINE BESYLATE 5 MG/1
5 TABLET ORAL
Status: DISCONTINUED | OUTPATIENT
Start: 2020-09-11 | End: 2020-09-11

## 2020-09-11 RX ADMIN — Medication 10 ML: at 20:47

## 2020-09-11 RX ADMIN — HEPARIN SODIUM 5000 UNITS: 5000 INJECTION INTRAVENOUS; SUBCUTANEOUS at 06:00

## 2020-09-11 RX ADMIN — SODIUM CHLORIDE 100 MG: 900 INJECTION, SOLUTION INTRAVENOUS at 20:47

## 2020-09-11 RX ADMIN — SODIUM CHLORIDE 100 MG: 900 INJECTION, SOLUTION INTRAVENOUS at 10:00

## 2020-09-11 RX ADMIN — LOSARTAN POTASSIUM 75 MG: 25 TABLET ORAL at 16:19

## 2020-09-11 RX ADMIN — AMITRIPTYLINE HYDROCHLORIDE 25 MG: 25 TABLET, FILM COATED ORAL at 20:46

## 2020-09-11 RX ADMIN — HEPARIN SODIUM 5000 UNITS: 5000 INJECTION INTRAVENOUS; SUBCUTANEOUS at 22:06

## 2020-09-11 RX ADMIN — HYDRALAZINE HYDROCHLORIDE 10 MG: 20 INJECTION INTRAMUSCULAR; INTRAVENOUS at 11:25

## 2020-09-11 RX ADMIN — HYDRALAZINE HYDROCHLORIDE 50 MG: 25 TABLET, FILM COATED ORAL at 16:18

## 2020-09-11 RX ADMIN — ONDANSETRON 4 MG: 2 INJECTION INTRAMUSCULAR; INTRAVENOUS at 23:43

## 2020-09-11 RX ADMIN — GABAPENTIN 200 MG: 100 CAPSULE ORAL at 16:18

## 2020-09-11 RX ADMIN — LOPERAMIDE HYDROCHLORIDE 2 MG: 2 CAPSULE ORAL at 16:19

## 2020-09-11 RX ADMIN — LEVETIRACETAM 500 MG: 5 INJECTION INTRAVENOUS at 20:47

## 2020-09-11 RX ADMIN — LOSARTAN POTASSIUM 25 MG: 25 TABLET ORAL at 13:02

## 2020-09-11 RX ADMIN — AMPICILLIN SODIUM AND SULBACTAM SODIUM 1.5 G: 1; .5 INJECTION, POWDER, FOR SOLUTION INTRAMUSCULAR; INTRAVENOUS at 11:43

## 2020-09-11 RX ADMIN — CEFDINIR 300 MG: 125 POWDER, FOR SUSPENSION ORAL at 16:18

## 2020-09-11 RX ADMIN — HYDROCODONE BITARTRATE AND ACETAMINOPHEN 1 TABLET: 10; 325 TABLET ORAL at 20:55

## 2020-09-11 RX ADMIN — ROPINIROLE 0.25 MG: 0.25 TABLET, FILM COATED ORAL at 20:46

## 2020-09-11 RX ADMIN — GABAPENTIN 200 MG: 100 CAPSULE ORAL at 20:46

## 2020-09-11 RX ADMIN — HEPARIN SODIUM 5000 UNITS: 5000 INJECTION INTRAVENOUS; SUBCUTANEOUS at 16:19

## 2020-09-11 RX ADMIN — AMLODIPINE BESYLATE 10 MG: 5 TABLET ORAL at 13:02

## 2020-09-11 RX ADMIN — LEVETIRACETAM 500 MG: 5 INJECTION INTRAVENOUS at 09:56

## 2020-09-11 RX ADMIN — Medication 10 ML: at 10:00

## 2020-09-11 NOTE — PLAN OF CARE
Patient transferred to unit from ICU.  Blood pressure elevated to 191/89 and provider was notified.  IV labetalol given and BP has been improving.  Patient is confused at times.  Will continue to monitor.  Problem: Skin Injury Risk (Adult)  Goal: Identify Related Risk Factors and Signs and Symptoms  Outcome: Ongoing (interventions implemented as appropriate)  Goal: Skin Health and Integrity  Outcome: Ongoing (interventions implemented as appropriate)  Goal: Identify Related Risk Factors and Signs and Symptoms  Outcome: Ongoing (interventions implemented as appropriate)  Goal: Skin Health and Integrity  Outcome: Ongoing (interventions implemented as appropriate)     Problem: Fall Risk (Adult)  Goal: Identify Related Risk Factors and Signs and Symptoms  Outcome: Ongoing (interventions implemented as appropriate)  Goal: Absence of Fall  Outcome: Ongoing (interventions implemented as appropriate)     Problem: Patient Care Overview  Goal: Plan of Care Review  Outcome: Ongoing (interventions implemented as appropriate)  Goal: Individualization and Mutuality  Outcome: Ongoing (interventions implemented as appropriate)  Goal: Discharge Needs Assessment  Outcome: Ongoing (interventions implemented as appropriate)  Goal: Interprofessional Rounds/Family Conf  Outcome: Ongoing (interventions implemented as appropriate)

## 2020-09-11 NOTE — CONSULTS
"  Referring Provider: Dr Gomez   Reason for Consultation: depression anxiety       Chief complaint \"I had seizures\"     Subjective .     History of present illness:  The patient is a 45 y.o. female who was admitted secondary to SZ activity. PMH: ESRD on HD MWF, seizures, hypertension, GERD, hyperparathyroidism, IBS, anemia, hyperphosphatemia, HFpEF, depression, TTP, anxiety/depression . Psych consult was requested by Dr Gomez 2ry to depression anxiety.  The pt acknowledged hx of  Depression, stated she was on meds in the past, recently she was feeling more anxious 2ry to health issues, but denied feeling hopeless/helpless, denied AVH/SI/HI. Per staff, no inappropriate behavior .  Past psych hx: depression anxiety       Review of Systems   All systems were reviewed and negative except for:  Constitution:  positive for fatigue  Musculoskeletal: positive for  muscle pain and muscle weakness  Neurological: positive for  seizures  Behavioral/Psych: positive for  anxiety    History    Past Medical History:   Diagnosis Date   • (HFpEF) heart failure with preserved ejection fraction (CMS/HCC)    • Acid reflux    • Anxiety and depression    • Cardiomyopathy (CMS/HCC)    • Chronic pain    • Dependence on renal dialysis (CMS/HCC)    • Hyperparathyroidism (CMS/HCC)    • Hypertension    • IBS (irritable bowel syndrome)    • Migraines    • Neuropathy    • Pulmonary emboli (CMS/HCC)    • Renal failure    • Seizures (CMS/HCC)    • TTP (thrombotic thrombocytopenic purpura) (CMS/HCC)           Family History   Problem Relation Age of Onset   • Kidney disease Mother    • Hypertension Mother    • Heart disease Father    • Heart disease Sister    • Heart disease Brother         Social History     Tobacco Use   • Smoking status: Current Every Day Smoker     Packs/day: 0.50   Substance Use Topics   • Alcohol use: No   • Drug use: No          Medications Prior to Admission   Medication Sig Dispense Refill Last Dose   • amitriptyline (ELAVIL) " 25 MG tablet Take 25 mg by mouth Every Night.      • cloNIDine (CATAPRES) 0.2 MG tablet Take 0.2 mg by mouth 2 (Two) Times a Day.      • escitalopram (LEXAPRO) 20 MG tablet Take 20 mg by mouth Daily.      • gabapentin (NEURONTIN) 600 MG tablet Take 600 mg by mouth 3 (Three) Times a Day.      • HYDROcodone-acetaminophen (NORCO)  MG per tablet Take 1 tablet by mouth 3 (Three) Times a Day.      • lisinopril (PRINIVIL,ZESTRIL) 40 MG tablet Take 40 mg by mouth Every Night.      • pantoprazole (PROTONIX) 20 MG EC tablet Take 20 mg by mouth 2 (two) times a day.      • promethazine (PHENERGAN) 25 MG tablet Take 25 mg by mouth Every 8 (Eight) Hours As Needed for Nausea or Vomiting.      • rOPINIRole (REQUIP) 0.25 MG tablet Take 0.25 mg by mouth Every Night. Take 1 hour before bedtime.           Scheduled Meds:    amitriptyline 25 mg Oral Nightly   amLODIPine 10 mg Oral Q24H   [START ON 9/12/2020] cefdinir 300 mg Oral Once per day on Tue Thu Sat   cefdinir 300 mg Oral Once   cloNIDine 1 patch Transdermal Weekly   epoetin jaskaran/jaskaran-epbx 5,000 Units Intravenous Once   [START ON 9/12/2020] epoetin jaskaran/jaskaran-epbx 5,000 Units Intravenous Once   escitalopram 20 mg Oral Daily   gabapentin 200 mg Oral TID   heparin (porcine) 5,000 Units Subcutaneous Q8H   insulin lispro 0-7 Units Subcutaneous Q6H   lacosamide (VIMPAT) IVPB 100 mg Intravenous Q12H   levETIRAcetam 500 mg Intravenous Q12H   losartan 25 mg Oral Q24H   pantoprazole 40 mg Oral Q AM   rOPINIRole 0.25 mg Oral Nightly   sodium chloride 10 mL Intravenous Q12H        Continuous Infusions:    dextrose 35 mL/hr Last Rate: 35 mL/hr (09/10/20 0937)       PRN Meds:  •  acetaminophen  •  dextrose  •  dextrose  •  glucagon (human recombinant)  •  HYDROcodone-acetaminophen  •  insulin lispro **AND** insulin lispro  •  ondansetron **OR** ondansetron  •  [COMPLETED] Insert peripheral IV **AND** sodium chloride  •  sodium chloride      Allergies:  Butorphanol; Codeine; Contrast dye;  "Golytely [peg 3350-electrolytes]; Haldol [haloperidol]; Morphine and related; Sulfa antibiotics; Toradol [ketorolac tromethamine]; and Vancomycin      Objective     Vital Signs   BP (!) 217/67   Pulse 86   Temp 98.6 °F (37 °C) (Oral)   Resp 17   Ht 170.2 cm (67\")   Wt 68.3 kg (150 lb 9.2 oz)   SpO2 98%   BMI 23.58 kg/m²     Physical Exam:     General Appearance:    In NAD    Head:    Normocephalic, without obvious abnormality, atraumatic                   Mental Status Exam:    Hygiene:   good  Cooperation:  Cooperative  Eye Contact:  Fair  Psychomotor Behavior:  Appropriate  Affect:  Appropriate   Mood : nervous   Hopelessness: Denies  Speech:  Normal  Thought Progress:  concrete   Thought Content:  Mood congruent  Suicidal:  None  Homicidal:  None  Hallucinations:  None  Delusion:  None  Memory:  decreased   Orientation:  Person, Place and Situation  Reliability:  fair  Insight:  Fair  Judgement:  Fair  Impulse Control:  Fair  Physical/Medical Issues:  Yes      Medications and allergies reviewed     Lab Results   Component Value Date    GLUCOSE 109 (H) 09/11/2020    CALCIUM 9.1 09/11/2020     09/11/2020    K 3.7 09/11/2020    CO2 28.0 09/11/2020    CL 93 (L) 09/11/2020    BUN  09/11/2020      Comment:      Testing performed by alternate method    BUN 13 09/11/2020    CREATININE 3.87 (H) 09/11/2020    EGFRIFAFRI  09/11/2020      Comment:      <15 Indicative of kidney failure.    EGFRIFNONA 13 (L) 09/11/2020    BCR  09/11/2020      Comment:      Testing not performed    ANIONGAP 15.0 09/11/2020       Last Urine Toxicity     There is no flowsheet data to display.          No results found for: PHENYTOIN, PHENOBARB, VALPROATE, CBMZ    Lab Results   Component Value Date     09/11/2020    BUN  09/11/2020      Comment:      Testing performed by alternate method    BUN 13 09/11/2020    CREATININE 3.87 (H) 09/11/2020    TSH 0.562 09/07/2020    WBC 5.00 09/11/2020       Brief Urine Lab Results  (Last " result in the past 365 days)      Color   Clarity   Blood   Leuk Est   Nitrite   Protein   CREAT   Urine HCG        09/06/20 2101 Yellow Clear Moderate (2+) Large (3+) Negative >=300 mg/dL (3+)               Assessment/Plan       Seizure (CMS/[facility])    Anxiety    Benign essential HTN    Chronic diastolic (congestive) heart failure (CMS/[facility])    Depression    End stage renal failure on dialysis (CMS/[facility])    GERD (gastroesophageal reflux disease)    HTN (hypertension)    Hyperparathyroidism (CMS/[facility])    Hyperphosphatemia    Tobacco abuse    Pneumonia    Hypertensive emergency    Acute encephalopathy    (HFpEF) heart failure with preserved ejection fraction (CMS/[facility])       Assessment: Mood d/o 2ry to medical condition, anxiety d/o 2ry to medical condition (SZ, ESRD)   Treatment Plan: the pt is more anxious, no inappropriate behavior, cont lexapro 20 mg , add low dose of risperidone 0.25 mg po BID PRN for anxiety   Supportive therapy   Treatment Plan discussed with: Patient and nursing     I discussed the patients findings and my recommendations with patient and nursing staff    I have reviewed and approved the behavioral health treatment plans and problem list. Yes  Thank you for the consult   Referring MD has access to consult report and progress notes in EMR     Sherri Templeton MD  09/11/20  14:26

## 2020-09-11 NOTE — NURSING NOTE
Transferred to room 254 on [facility].  Updated Michael, the receiving RN.  VSS upon transfer;   on room air; D10 infusing @35 ml/hr

## 2020-09-11 NOTE — PLAN OF CARE
Pt having multiple BM's this shift, MD notified, awaiting new orders. HTN issues during this shift, new orders in place. BP monitored q30 mins. Pt educated on importance of getting nutrition orally or tube placement will be necessary. Will continue to monitor.      Problem: Hypertensive Disease/Crisis (Arterial) (Adult)  Goal: Signs and Symptoms of Listed Potential Problems Will be Absent, Minimized or Managed (Hypertensive Disease/Crisis)  Outcome: Ongoing (interventions implemented as appropriate)  Flowsheets (Taken 9/11/2020 4083)  Problems Assessed (Hypertensive Disease/Crisis (Arterial)): all     Problem: Patient Care Overview  Goal: Plan of Care Review  Outcome: Ongoing (interventions implemented as appropriate)

## 2020-09-11 NOTE — PROGRESS NOTES
Continued Stay Note  [facility] Rojas     Patient Name: Maura Garibay  MRN: [id number]  Today's Date: 9/11/2020    Admit Date: 9/6/2020    Discharge Plan     Row Name 09/11/20 1624       Plan    Plan Comments  SW met at the bedside wearing appropriate ppe (mask and goggles) to assess pt's orientation and ability to make decisions. Pt is not oriented to place or situation. SW to follow over the weekend.        KIERSTEN Lindsey, LSW    Office: [phone]  Cell: [phone]  Fax: [phone]  E-mail: [email or web address]

## 2020-09-11 NOTE — PROGRESS NOTES
[facility] Medicine Services Daily Progress Note      Hospitalist Team  LOS 5 days      Patient Care Team:  Danial Landeros MD as PCP - General (Family Medicine)  Dewey Jack MD as PCP - Claims Attributed    Patient Location: 254/1      Subjective   Subjective   Denies for any new complaint, no nausea or vomiting, no chest pain.   Chief Complaint / Subjective  Chief Complaint   Patient presents with   • Seizures         Brief Synopsis of Hospital Course/HPI       Note taken from Intensivist with additional editing:  Maura Garibay is a 45 y.o. female with a PMH significant for ESRD on HD MWF, seizures, hypertension, GERD, hyperparathyroidism, IBS, anemia, hyperphosphatemia, HFpEF, depression, TTP, anxiety/depression and tobacco abuse who presented to the ER on 9/6/2020 after being found by her neighbor shaking with seizure like activity.  EMS was called and when they arrived she had some jerking of her extremities.  Upon arrival to the ER, she was evaluated and had seizure like activity and was given two doses of Ativan and a gram of Keppra.    Patient was unable to provide history due to being obtunded.  Patient was admitted to ICU for further care and management.     9/7/2020 Neurology was consulted.       9/8/2020 Patient placed on Cardene drip.  Nephrology was consulted due to end stage renal disease.     9/9/2020 Per neurology's note there is question on whether patient is compliant on her medication.  Patient was noted to be on D10 and Cardene drip.     9/10/2020 Patient was taken off Cardene drip.  It was noted patient remained hypoglycemic and D10 was continued.  Patient stable for downgrade.  Hospitalist were consulted for medical management.  Patient will receive dialysis today. Patient is slow to respond.  Patient currently on 2 L nasal cannula.  Patient has complaints of abdominal pain with tenderness.    Date::          ROS      Objective   Objective      Vital Signs  Temp:  " [98.3 °F (36.8 °C)-99.1 °F (37.3 °C)] 98.6 °F (37 °C)  Heart Rate:  [] 85  Resp:  [12-20] 12  BP: (117-231)/() 182/81  Oxygen Therapy  SpO2: 100 %  Pulse Oximetry Type: Intermittent  Device (Oxygen Therapy): nasal cannula  Device (Oxygen Therapy): room air  Flow (L/min): 2  Flowsheet Rows      First Filed Value   Admission Height  170.2 cm (67\") Documented at 09/06/2020 1756   Admission Weight  81.6 kg (180 lb) Documented at 09/06/2020 1756        Intake & Output (last 3 days)       09/08 0701 - 09/09 0700 09/09 0701 - 09/10 0700 09/10 0701 - 09/11 0700 09/11 0701 - 09/12 0700    P.O.    100    I.V. (mL/kg) 1292 (19.7) 340 (5.2)      IV Piggyback   150     Total Intake(mL/kg) 1292 (19.7) 340 (5.2) 150 (2.2) 100 (1.5)    Urine (mL/kg/hr) 0 (0)       Other   3400     Stool 0       Total Output 0  3400     Net +1292 +340 -3250 +100            Stool Unmeasured Occurrence 1 x 1 x 2 x         Lines, Drains & Airways    Active LDAs     Name:   Placement date:   Placement time:   Site:   Days:    Peripheral IV 09/10/20 1348 Left;Posterior Wrist   09/10/20    1348    Wrist   less than 1    Single Lumen Implantable Port 09/07/20 Left Chest   09/07/20    0329    Chest   4                  Physical Exam:    Physical Exam   Constitutional: She is oriented to person, place, and time. She appears well-developed and well-nourished. No distress.   HENT:   Head: Normocephalic and atraumatic.   Right Ear: External ear normal.   Left Ear: External ear normal.   Nose: Nose normal.   Mouth/Throat: Oropharynx is clear and moist. No oropharyngeal exudate.   Eyes: Pupils are equal, round, and reactive to light. Conjunctivae and EOM are normal. Right eye exhibits no discharge. Left eye exhibits no discharge. No scleral icterus.   Neck: Normal range of motion. No JVD present. No tracheal deviation present. No thyromegaly present.   Cardiovascular: Normal rate, regular rhythm, normal heart sounds and intact distal pulses. Exam " reveals no gallop and no friction rub.   No murmur heard.  Pulmonary/Chest: Effort normal and breath sounds normal. No stridor. No respiratory distress. She has no wheezes. She has no rales. She exhibits no tenderness.   Abdominal: Soft. Bowel sounds are normal. She exhibits no distension and no mass. There is no tenderness. There is no rebound and no guarding. No hernia.   Musculoskeletal: Normal range of motion. She exhibits no edema, tenderness or deformity.   Lymphadenopathy:     She has no cervical adenopathy.   Neurological: She is alert and oriented to person, place, and time. No cranial nerve deficit or sensory deficit. She exhibits normal muscle tone. Coordination normal.   Skin: Skin is warm and dry. No rash noted. She is not diaphoretic. No erythema.   Psychiatric: She has a normal mood and affect. Her behavior is normal.   Nursing note and vitals reviewed.           Wounds (last 24 hours)      LDA Wound     Row Name 09/10/20 2102 09/10/20 2000 09/10/20 1600       [REMOVED] Wound 09/06/20 2210 Left scapula    Wound - Properties Group Date first assessed: 09/06/20  -AS Time first assessed: 2210  -AS Present on Hospital Admission: Y  -AS Side: Left  -AS Location: scapula  -AS Resolution Date: 09/10/20  -AA Resolution Time: 2236 -AA    Dressing Appearance  --  -AA  open to air  -MT  open to air  -JW    Closure  --  -AA  --  None  -JW    Base  --  -AA  --  maroon/purple  -JW    Periwound  --  -AA  --  intact;dry  -JW    Periwound Temperature  --  -AA  --  warm  -JW       [REMOVED] Wound 09/06/20 2210 Left upper back    Wound - Properties Group Date first assessed: 09/06/20  -AS Time first assessed: 2210  -AS Present on Hospital Admission: Y  -AS Side: Left  -AS Orientation: upper  -AS Location: back  -AS Resolution Date: 09/10/20  -AA Resolution Time: 2235 -AA    Dressing Appearance  --  open to air  -MT  --    Closure  --  -AA  --  Open to air  -JW    Base  --  -AA  --  maroon/purple  -JW    Periwound  --   -AA  --  intact;dry  -JW    Periwound Temperature  --  -AA  --  --      User Key  (r) = Recorded By, (t) = Taken By, (c) = Cosigned By    Initials Name Provider Type    AS Bishnu Reno, RN Registered Nurse    Sophy Lechuga RN Registered Nurse    Michael Napier RN Registered Nurse    Denilson Hughes RN Registered Nurse          Procedures:              Results Review:     I reviewed the patient's new clinical results.      Lab Results (last 24 hours)     Procedure Component Value Units Date/Time    POC Glucose Once [[id number]]  (Normal) Collected:  09/11/20 1117    Specimen:  Blood Updated:  09/11/20 1123     Glucose 72 mg/dL      Comment: Serial Number: [id number]:  [id number]       BUN [[id number]]  (Normal) Collected:  09/11/20 0325    Specimen:  Blood Updated:  09/11/20 0725     BUN 13 mg/dL     POC Glucose Once [[id number]]  (Normal) Collected:  09/11/20 0556    Specimen:  Blood Updated:  09/11/20 0608     Glucose 90 mg/dL      Comment: Serial Number: [id number]:  [id number]       Blood Culture - Blood, Blood, Central Line [[id number]] Collected:  09/07/20 0448    Specimen:  Blood, Central Line Updated:  09/11/20 0500     Blood Culture No growth at 4 days    Comprehensive Metabolic Panel [[id number]]  (Abnormal) Collected:  09/11/20 0325    Specimen:  Blood Updated:  09/11/20 0452     Glucose 109 mg/dL      BUN --     Comment: Testing performed by alternate method        Creatinine 3.87 mg/dL      Sodium 136 mmol/L      Potassium 3.7 mmol/L      Chloride 93 mmol/L      CO2 28.0 mmol/L      Calcium 9.1 mg/dL      Total Protein 6.5 g/dL      Albumin 3.60 g/dL      ALT (SGPT) <5 U/L      AST (SGOT) 14 U/L      Alkaline Phosphatase 270 U/L      Total Bilirubin 0.5 mg/dL      eGFR Non African Amer 13 mL/min/1.73      Comment: <15 Indicative of kidney failure.        eGFR   Amer --     Comment: <15 Indicative of kidney failure.        Globulin 2.9 gm/dL      A/G Ratio 1.2 g/dL       BUN/Creatinine Ratio --     Comment: Testing not performed        Anion Gap 15.0 mmol/L     Narrative:       GFR Normal >60  Chronic Kidney Disease <60  Kidney Failure <15      Magnesium [[id number]]  (Normal) Collected:  09/11/20 0325    Specimen:  Blood Updated:  09/11/20 0438     Magnesium 1.9 mg/dL     Phosphorus [[id number]]  (Normal) Collected:  09/11/20 0325    Specimen:  Blood Updated:  09/11/20 0438     Phosphorus 4.3 mg/dL     Protime-INR [[id number]]  (Abnormal) Collected:  09/11/20 0325    Specimen:  Blood Updated:  09/11/20 0419     Protime 12.5 Seconds      INR 1.16    aPTT [[id number]]  (Normal) Collected:  09/11/20 0325    Specimen:  Blood Updated:  09/11/20 0419     PTT 30.1 seconds     CBC & Differential [[id number]] Collected:  09/11/20 0325    Specimen:  Blood Updated:  09/11/20 0418    Narrative:       The following orders were created for panel order CBC & Differential.  Procedure                               Abnormality         Status                     ---------                               -----------         ------                     CBC Auto Differential[[id number]]        Abnormal            Final result                 Please view results for these tests on the individual orders.    CBC Auto Differential [[id number]]  (Abnormal) Collected:  09/11/20 0325    Specimen:  Blood Updated:  09/11/20 0418     WBC 5.00 10*3/mm3      RBC 3.39 10*6/mm3      Hemoglobin 10.2 g/dL      Hematocrit 31.1 %      MCV 91.9 fL      MCH 30.2 pg      MCHC 32.8 g/dL      RDW 17.6 %      RDW-SD 56.4 fl      MPV 9.1 fL      Platelets 161 10*3/mm3      Neutrophil % 72.4 %      Lymphocyte % 17.0 %      Monocyte % 8.4 %      Eosinophil % 1.8 %      Basophil % 0.4 %      Neutrophils, Absolute 3.70 10*3/mm3      Lymphocytes, Absolute 0.90 10*3/mm3      Monocytes, Absolute 0.40 10*3/mm3      Eosinophils, Absolute 0.10 10*3/mm3      Basophils, Absolute 0.00 10*3/mm3      nRBC 0.1 /100 WBC     POC Glucose Once  [[id number]]  (Normal) Collected:  09/11/20 0052    Specimen:  Blood Updated:  09/11/20 0053     Glucose 83 mg/dL      Comment: Serial Number: [id number]:  [id number]       Blood Culture - Blood, Hand, Left [[id number]] Collected:  09/06/20 2349    Specimen:  Blood from Hand, Left Updated:  09/11/20 0045     Blood Culture No growth at 4 days    Blood Culture - Blood, Blood, Central Line [[id number]] Collected:  09/06/20 2351    Specimen:  Blood, Central Line Updated:  09/11/20 0015     Blood Culture No growth at 4 days    POC Glucose Once [[id number]]  (Normal) Collected:  09/10/20 1706    Specimen:  Blood Updated:  09/10/20 1707     Glucose 102 mg/dL      Comment: Serial Number: [id number]:  [id number]           No results found for: HGBA1C  Results from last 7 days   Lab Units 09/11/20  0325 09/08/20  0644 09/06/20  1906   INR  1.16* 1.01 1.01           Lab Results   Component Value Date    LIPASE 81 07/13/2020     Lab Results   Component Value Date    CHLPL 196 07/13/2020    TRIG 91 07/13/2020    HDL 40 (L) 07/13/2020     (H) 07/13/2020       No results found for: INTRAOP, PREDX, FINALDX, COMDX    Microbiology Results (last 10 days)     Procedure Component Value - Date/Time    Blood Culture - Blood, Blood, Central Line [[id number]] Collected:  09/07/20 0448    Lab Status:  Preliminary result Specimen:  Blood, Central Line Updated:  09/11/20 0500     Blood Culture No growth at 4 days    Blood Culture - Blood, Blood, Central Line [[id number]] Collected:  09/06/20 2351    Lab Status:  Preliminary result Specimen:  Blood, Central Line Updated:  09/11/20 0015     Blood Culture No growth at 4 days    MRSA Screen, PCR (Inpatient) - Swab, Nares [[id number]]  (Normal) Collected:  09/06/20 2350    Lab Status:  Final result Specimen:  Swab from Nares Updated:  09/07/20 0256     MRSA PCR No MRSA Detected    Blood Culture - Blood, Hand, Left [[id number]] Collected:  09/06/20 2349    Lab Status:  Preliminary result  Specimen:  Blood from Hand, Left Updated:  09/11/20 0045     Blood Culture No growth at 4 days    COVID-19,[facility] IN-HOUSE,Nasal Swab No Transport Media 3-4 HR TAT - Swab, Nasal Cavity [[id number]]  (Normal) Collected:  09/06/20 2148    Lab Status:  Final result Specimen:  Swab from Nasal Cavity Updated:  09/06/20 2226     COVID19 Not Detected    Narrative:       Fact sheet for providers: https://www.fda.gov/media/522247/download     Fact sheet for patients: https://www.fda.gov/media/762023/download          ECG/EMG Results (most recent)     Procedure Component Value Units Date/Time    ECG 12 Lead [[id number]] Collected:  09/06/20 1847     Updated:  09/07/20 1422    Narrative:       HEART RATE= 104  bpm  RR Interval= 580  ms  FL Interval= 159  ms  P Horizontal Axis= 51  deg  P Front Axis= 33  deg  QRSD Interval= 97  ms  QT Interval= 359  ms  QRS Axis= 0  deg  T Wave Axis= 90  deg  - ABNORMAL ECG -  Sinus tachycardia  Abnormal T, consider ischemia, lateral leads  ST elev, probable normal early repol pattern  No previous ECG available for comparison  Electronically Signed By: Tee Cotter (CHANTEL) 07-Sep-2020 14:05:51  Date and Time of Study: 2020-09-06 18:47:43                    Ct Head Without Contrast    Result Date: 9/6/2020  1. No acute intracranial abnormality. 2. Changes of prior right frontal craniotomy. Electronically signed by:  Yohannes Guerrero M.D.  9/6/2020 6:29 PM    Xr Chest 1 View    Result Date: 9/7/2020  1.Right subclavian catheter tip now projects at the cavoatrial junction. Left IJ port catheter tip also appears to be an area of the cavoatrial junction. 2.Stable cardiomegaly. 3.No significant interval change in diffuse interstitial and bibasilar airspace opacities. No pneumothorax is seen.  Electronically Signed By-DR. Mani Leo MD On:9/7/2020 11:34 AM This report was finalized on [id number] by DR. Mani Leo MD.    Xr Chest 1 View    Result Date: 9/7/2020  1.Right subclavian catheter  projects in the right atrium. Catheter could be withdrawn approximately 6 cm to be in the area of the cavoatrial junction on this exam. 2.Left IJ port catheter terminates in the superior right atrium. 3.No pneumothorax is seen. 4.Cardiomegaly. 5.Diffuse interstitial and bibasilar airspace opacities which could indicate edema or pneumonia.  Electronically Signed By-DR. Mani Leo MD On:9/7/2020 8:02 AM This report was finalized on [id number] by DR. Mani Leo MD.          Xrays, labs reviewed personally by physician.    Medication Review:   I have reviewed the patient's current medication list      Scheduled Meds    amitriptyline 25 mg Oral Nightly   amLODIPine 5 mg Oral Q24H   ampicillin-sulbactam 1.5 g Intravenous Q12H   cloNIDine 1 patch Transdermal Weekly   epoetin jaskaran/jaskaran-epbx 5,000 Units Intravenous Once   escitalopram 20 mg Oral Daily   gabapentin 200 mg Oral TID   heparin (porcine) 5,000 Units Subcutaneous Q8H   hydrALAZINE 25 mg Oral Q8H   insulin lispro 0-7 Units Subcutaneous Q6H   lacosamide (VIMPAT) IVPB 100 mg Intravenous Q12H   levETIRAcetam 500 mg Intravenous Q12H   pantoprazole 40 mg Oral Q AM   rOPINIRole 0.25 mg Oral Nightly   sodium chloride 10 mL Intravenous Q12H       Meds Infusions    dextrose 35 mL/hr Last Rate: 35 mL/hr (09/10/20 0937)       Meds PRN  •  acetaminophen  •  dextrose  •  dextrose  •  glucagon (human recombinant)  •  HYDROcodone-acetaminophen  •  insulin lispro **AND** insulin lispro  •  ondansetron **OR** ondansetron  •  [COMPLETED] Insert peripheral IV **AND** sodium chloride  •  sodium chloride        Assessment/Plan   Assessment/Plan     Active Hospital Problems    Diagnosis  POA   • **Seizure (CMS/HCC) [R56.9]  Yes   • GERD (gastroesophageal reflux disease) [K21.9]  Yes   • Hyperparathyroidism (CMS/HCC) [E21.3]  Yes   • Benign essential HTN [I10]  Yes   • Pneumonia [J18.9]  Yes   • Hypertensive emergency [I16.1]  Yes   • Acute encephalopathy [G93.40]  Yes   •  (HFpEF) heart failure with preserved ejection fraction (CMS/[facility]) [I50.30]  Yes   • Depression [F32.9]  Yes   • HTN (hypertension) [I10]  Yes   • Hyperphosphatemia [E83.39]  Yes   • Chronic diastolic (congestive) heart failure (CMS/[facility]) [I50.32]  Yes   • Tobacco abuse [Z72.0]  Yes   • Anxiety [F41.9]  Yes   • End stage renal failure on dialysis (CMS/[facility]) [N18.6, Z99.2]  Not Applicable      Resolved Hospital Problems   No resolved problems to display.       MEDICAL DECISION MAKING COMPLEXITY BY PROBLEM:     Seizure   - Patient has history and on keppra and vimpat on outpatient  -EEG if no improvement in mental status   -CK level 52  -Continue Keppra 500 mg IV bid and Vimapt 100 mg IV BID and switch to oral when appropriate  -Seizure precautions   -Neurology following-question on whether patient is compliant on medications     Hypertensive emergency-noted BP running high  -Monitor blood pressure  - Continue clonidine patch   - add po hydralazine and norvasc .... Once BP better controlled .... Will discharge the patient.     Pneumonia  - Possible aspiration due to seizure   -Given Cefepime and Clindamycin given h/o seizures. On Unasyn, now changed to oral omnicef on discharge.  -COVID19 swab Negative  -RVP ordered  -Urine antigen for Strep and Legionella pending  -MRSA nasal swab Negative  -Pulmonology following     Acute encephalopathy- Improving  - Slow to respond, currently on alert and oriented X3   -CT head negative for acute findings  - postictal state vs hypertensive encephalopathy  -ASA normal   -APAP < 5  - Neurology following      ESRD with HD dependence  - CR 6.1, BUN 27- monitor   -Nephrology following   -HD planned for today     Anemia of chronic disease   -Hemoglobin 10.6 on admission  -No signs of active bleeding  -EGD 07/14/20 at [place] without acute findings     HFpEF  - EF unknown   - Reported in patient medical chart      Hyperparathyroidism  -Continue Sensipar and Calcitriol per  renal     Hyperphosphatemia  -Continue phos binders per renal     Anxiety/depression  -Continue Lexapro, elavil, and Requip     GERD   - Continue PPI      Tobacco abuse  - Encourage cessation         VTE Prophylaxis -     VTE Prophylaxis -   Mechanical Order History:      Ordered        09/06/20 2149  Place Sequential Compression Device  Once         09/06/20 2149  Maintain Sequential Compression Device  Continuous                 Pharmalogical Order History:     Ordered     Dose Route Frequency Stop    09/06/20 2149  heparin (porcine) 5000 UNIT/ML injection 5,000 Units      5,000 Units SC Every 8 Hours Scheduled --            Code Status -   Code Status and Medical Interventions:   Ordered at: 09/06/20 2149     Code Status:    CPR     Medical Interventions (Level of Support Prior to Arrest):    Full       This patient has been examined wearing appropriate Personal Protective Equipment and discussed with hospital infection control department. 09/11/20        Discharge Planning            Electronically signed by Danni Amaral MD, 09/11/20, 12:03.  [facility] Hospitalist Team

## 2020-09-11 NOTE — PLAN OF CARE
Problem: Patient Care Overview  Goal: Plan of Care Review  Outcome: Ongoing (interventions implemented as appropriate)  Flowsheets (Taken 9/11/2020 1441)  Progress: improving  Plan of Care Reviewed With: patient  Outcome Summary: Pt seen at lunch time this date for meal assessment. Pt with tray consisting of hamburger, mashed potatoes and gravy, sprite and icebox pie. Pt independently tore small pieces of her hamburger off before eating them and demonstrated functional mastication. No overt s/s of aspiration observed with any consistency. Vocal quality remained clear across all trials. Pt is tolerating current diet at this time. REC: continue soft to chew and thins, meds as tolerated.     **NOTE: pt only really oriented to self. Unaware of who she lives with, her age, how old daughter is, where she is, etc. Pt does not appear appropriate or safe to return home due to cognitive status and would benefit from IP rehab at discharge.

## 2020-09-11 NOTE — PROGRESS NOTES
"   LOS: 5 days    Patient Care Team:  Danial Landeros MD as PCP - General (Family Medicine)  Dewey Jack MD as PCP - Claims Attributed      Subjective     Patient was seen this morning  Patient awake and alert.  Somewhat confused    Objective     Vital Sign Min/Max for last 24 hours  Temp:  [98.3 °F (36.8 °C)-99.1 °F (37.3 °C)] 98.6 °F (37 °C)  Heart Rate:  [] 85  Resp:  [12-20] 12  BP: (117-231)/() 182/81                       Flowsheet Rows      First Filed Value   Admission Height  170.2 cm (67\") Documented at 09/06/2020 1756   Admission Weight  81.6 kg (180 lb) Documented at 09/06/2020 1756          I/O this shift:  In: 100 [P.O.:100]  Out: -   I/O last 3 completed shifts:  In: 490 [I.V.:340; IV Piggyback:150]  Out: 3400 [Other:3400]    Physical Exam:  Physical Exam    General.  Awake, alert  Head.  Atraumatic, normocephalic  Neck.  Supple  Respiratory.  Clear to auscultation  Cardiovascular.  Regular rhythm  Abdominal.  Obese, soft, Mild generalized tenderness  Extremities.  Trace edema       LABS:  Lab Results   Component Value Date    CALCIUM 9.1 09/11/2020    PHOS 4.3 09/11/2020     Results from last 7 days   Lab Units 09/11/20  0325 09/10/20  0457 09/09/20  0436  09/08/20  0644  09/06/20  1906   MAGNESIUM mg/dL 1.9 2.1 2.1  --  2.6   < >  --    SODIUM mmol/L 136 139 139  --  142   < > 143   POTASSIUM mmol/L 3.7 4.1 3.9   < > 6.3*   < > 5.7*   CHLORIDE mmol/L 93* 98 95*  --  101   < > 101   CO2 mmol/L 28.0 24.0 27.0  --  18.0*   < > 25.0   BUN  13 27* 17  --  67*   < > 43*   CREATININE mg/dL 3.87* 6.12* 4.55*  --  9.32*   < > 7.27*   GLUCOSE mg/dL 109* 116* 96  --  73   < > 100*   CALCIUM mg/dL 9.1 9.2 9.3  --  9.3   < > 8.8   WBC 10*3/mm3 5.00 5.60 5.60  --  5.80   < > 8.00   HEMOGLOBIN g/dL 10.2* 10.7* 10.1*  --  10.5*   < > 10.6*   PLATELETS 10*3/mm3 161 165 157  --  156   < > 147   ALT (SGPT) U/L <5  --   --   --  <5  --  <5   AST (SGOT) U/L 14  --   --   --  9  --  10    < > = values in " this interval not displayed.     Lab Results   Component Value Date    CKTOTAL 52 09/06/2020    TROPONINI 0.027 07/12/2020    TROPONINT 0.035 (C) 09/07/2020     Estimated Creatinine Clearance: 20 mL/min (A) (by C-G formula based on SCr of 3.87 mg/dL (H)).      Brief Urine Lab Results  (Last result in the past 365 days)      Color   Clarity   Blood   Leuk Est   Nitrite   Protein   CREAT   Urine HCG        09/06/20 2101 Yellow Clear Moderate (2+) Large (3+) Negative >=300 mg/dL (3+)             WEIGHTS:     Wt Readings from Last 1 Encounters:   09/11/20 0611 68.3 kg (150 lb 9.2 oz)   09/10/20 0551 65.8 kg (145 lb 1 oz)   09/09/20 0400 65.7 kg (144 lb 13.5 oz)   09/08/20 0400 66.6 kg (146 lb 13.2 oz)   09/07/20 0400 65.5 kg (144 lb 6.4 oz)   09/06/20 2210 65.5 kg (144 lb 6.4 oz)   09/06/20 1756 81.6 kg (180 lb)         amitriptyline 25 mg Oral Nightly   amLODIPine 5 mg Oral Q24H   cefdinir 125 mg Oral Daily   cloNIDine 1 patch Transdermal Weekly   epoetin jaskaran/jaskaran-epbx 5,000 Units Intravenous Once   escitalopram 20 mg Oral Daily   gabapentin 200 mg Oral TID   heparin (porcine) 5,000 Units Subcutaneous Q8H   hydrALAZINE 25 mg Oral Q8H   insulin lispro 0-7 Units Subcutaneous Q6H   lacosamide (VIMPAT) IVPB 100 mg Intravenous Q12H   levETIRAcetam 500 mg Intravenous Q12H   pantoprazole 40 mg Oral Q AM   rOPINIRole 0.25 mg Oral Nightly   sodium chloride 10 mL Intravenous Q12H       dextrose 35 mL/hr Last Rate: 35 mL/hr (09/10/20 0937)       Assessment/Plan     1.  ESRD  Hemodialysis Tomorrow  Electrolytes, volume status okay    2.  Hypertension with ESRD  Blood pressure still running high  Increasing amlodipine dose and add low-dose ARB  I will increase clonidine dose if needed    3.  Anemia with ESRD  Epogen with dialysis    4.  Seizures  Neurology following    Juanjose Tello MD  09/11/20  12:12

## 2020-09-11 NOTE — THERAPY TREATMENT NOTE
Acute Care - Speech Language Pathology   Swallow Treatment Note  Bob     Patient Name: Maura Garibay  : 1975  MRN: [id number]  Today's Date: 2020               Admit Date: 2020    Visit Dx:      ICD-10-CM ICD-9-CM   1. Seizure (CMS/HCC) R56.9 780.39   2. Hypertensive emergency I16.1 401.9   3. Chronic kidney disease with end stage renal failure on dialysis (CMS/[facility]) N18.6 585.6    Z99.2 V45.11     Patient Active Problem List   Diagnosis   • Seizure (CMS/HCC)   • Anemia associated with chronic renal failure   • Anxiety   • B12 deficiency   • Benign essential HTN   • Chronic diastolic (congestive) heart failure (CMS/HCC)   • DDD (degenerative disc disease), lumbar   • Depression   • Drug-seeking behavior   • End stage renal failure on dialysis (CMS/[facility])   • GERD (gastroesophageal reflux disease)   • HTN (hypertension)   • Hyperparathyroidism (CMS/[facility])   • Hyperphosphatemia   • History of TTP (thrombotic thrombocytopenic purpura)   • History of pulmonary embolism   • Migraines   • Tobacco abuse   • Pneumonia   • Hypertensive emergency   • Acute encephalopathy   • (HFpEF) heart failure with preserved ejection fraction (CMS/[facility])         Patient was not wearing a face mask during this therapy encounter. Therapist used appropriate personal protective equipment including mask, eye protection and gloves.  Mask used was N95/duckbill. Appropriate PPE was worn during the entire therapy session. Hand hygiene was completed before and after therapy session. Patient is not in enhanced droplet precautions.         Therapy Treatment  Rehabilitation Treatment Summary     Row Name 20 1400          Discipline  speech language pathologist  -    Document Type  therapy note (daily note)  -    Subjective Information  no complaints  -    Mode of Treatment  individual therapy;speech-language pathology  -    Patient/Family Observations  Pt sitting up in bed feeding herself lunch this date. Pt pleasant and  "agreeable to session. Noted pt to be only really oriented to self. Unaware of who she lives with, her age, how old daughter is, where she is, etc. Pt does not appear appropriate or safe to return home due to cognitive status and would benefit from IP rehab at discharge.   -MF    Therapy Frequency (Swallow)  PRN  -MF    Patient Effort  adequate  -MF    Recorded by [MF] Chery Gibbons SLP 09/11/20 1434    Row Name 09/11/20 1400          Daily Summary of Progress (SLP)  progress toward functional goals is good  -    Plan for Continued Treatment (SLP)  Continue soft to chew and thins at this time.  -    Anticipated Dischage Disposition (SLP)  inpatient rehabilitation facility  -MF    Recorded by [] Chery Gibbons SLP 09/11/20 1434      User Key  (r) = Recorded By, (t) = Taken By, (c) = Cosigned By    Initials Name Effective Dates Discipline     Chery Gibbons SLP 06/17/19 -  SLP          Outcome Summary  Outcome Summary/Treatment Plan (SLP)  Daily Summary of Progress (SLP): progress toward functional goals is good (09/11/20 1400 : Chery Gibbons, SLP)  Plan for Continued Treatment (SLP): Continue soft to chew and thins at this time. (09/11/20 1400 : Chery Gibbons, SLP)  Anticipated Dischage Disposition (SLP): inpatient rehabilitation facility (09/11/20 1400 : Chery Gibbons, SLP)      SLP GOALS     Row Name 09/11/20 1400          Oral Nutrition/Hydration Goal 1, SLP  Patient will be seen at a meal within 24-48 hours to assess tolerance of current diet with further recommendations to be given as indicated  -    Time Frame (Oral Nutrition/Hydration Goal 1, SLP)  2 days  -    Barriers (Oral Nutrition/Hydration Goal 1, SLP)  Pt seen at lunch time this date for meal assessment. Pt with tray consisting of hamburger, mashed potatoes and gravy, sprite and icebox pie. Pt initially stated \"I don't have my teeth in so I might choke a little bit.\" Dentures did not appear available in room. Pt independently " tore small pieces of her hamburger off before eating them and demonstrated functional mastication. No overt s/s of aspiration observed with any consistency. Vocal quality remained clear across all trials.   -MF    Progress/Outcomes (Oral Nutrition/Hydration Goal 1, SLP)  goal ongoing  -MF          Oral Nutrition/Hydration Goal 2, SLP  Patient will tolerate safest and least restrictive diet with no complications from aspiration  -MF    Time Frame (Oral Nutrition/Hydration Goal 2, SLP)  by discharge  -MF    Barriers (Oral Nutrition/Hydration Goal 2, SLP)  Pt is tolerating soft to chew and thins at this time - recommend continuing current diet.   -MF    Progress/Outcomes (Oral Nutrition/Hydration Goal 2, SLP)  goal ongoing  -MF      User Key  (r) = Recorded By, (t) = Taken By, (c) = Cosigned By    Initials Name Provider Type    Monica Campbell SLP Speech and Language Pathologist    Chery Gray SLP Speech and Language Pathologist          EDUCATION  The patient has been educated in the following areas:   Modified Diet Instruction.    SLP Recommendation and Plan  Daily Summary of Progress (SLP): progress toward functional goals is good     Plan for Continued Treatment (SLP): Continue soft to chew and thins at this time.  Anticipated Dischage Disposition (SLP): inpatient rehabilitation facility                     Therapy Charges for Today     Code Description Service Date Service Provider Modifiers Qty    32800841050 HC ST TREATMENT SWALLOW 4 9/11/2020 Chery Gibbons SLP GN 1                 FREDI Patricio  9/11/2020

## 2020-09-11 NOTE — PROGRESS NOTES
Nutrition Services    Patient Name:  Maura Garibay  YOB: 1975  MRN: [id number]  Admit Date:  9/6/2020    Progress note:  Tube feeding consult received.  Patient continues to eat poorly despite some improvement in mental status.    Patient with little to no nutrition x5 days. Recommend starting enteral nutrition to meet nutrition needs.  Secure message sent to attending physician to request tube feeding. Plan to initiate today if patient agreeable to tube placement.  Secure message with pt's RN as well regarding plan.    TF order: Novasource Renal @20mL/hour with 10mL/hour free water flush    RD to monitor closely for any s/s of refeeding syndrome noting poor po intake x5 days and unknown po intake PTA.      RD to follow up in AM for possible increase in tube feeding rate pending clinical picture.      Labs reviewed:  Results from last 7 days   Lab Units 09/11/20  0325 09/10/20  0457 09/09/20  0436  09/08/20  0644  09/06/20  1906   SODIUM mmol/L 136 139 139  --  142   < > 143   POTASSIUM mmol/L 3.7 4.1 3.9   < > 6.3*   < > 5.7*   CHLORIDE mmol/L 93* 98 95*  --  101   < > 101   CO2 mmol/L 28.0 24.0 27.0  --  18.0*   < > 25.0   BUN  13 27* 17  --  67*   < > 43*   CREATININE mg/dL 3.87* 6.12* 4.55*  --  9.32*   < > 7.27*   CALCIUM mg/dL 9.1 9.2 9.3  --  9.3   < > 8.8   BILIRUBIN mg/dL 0.5  --   --   --  0.5  --  0.5   ALK PHOS U/L 270*  --   --   --  345*  --  323*   ALT (SGPT) U/L <5  --   --   --  <5  --  <5   AST (SGOT) U/L 14  --   --   --  9  --  10   GLUCOSE mg/dL 109* 116* 96  --  73   < > 100*    < > = values in this interval not displayed.       Results from last 7 days   Lab Units 09/11/20  0325 09/10/20  0457 09/09/20  0436   MAGNESIUM mg/dL 1.9 2.1 2.1   PHOSPHORUS mg/dL 4.3 5.0* 4.3   HEMOGLOBIN g/dL 10.2* 10.7* 10.1*   HEMATOCRIT % 31.1* 32.3* 31.2*           Electronically signed by:  Lupe Leigh RD  09/11/20 14:02

## 2020-09-12 LAB
ALBUMIN SERPL-MCNC: 2.4 G/DL (ref 3.5–5.2)
ANION GAP SERPL CALCULATED.3IONS-SCNC: 12 MMOL/L (ref 5–15)
BACTERIA SPEC AEROBE CULT: NORMAL
BASOPHILS # BLD AUTO: 0 10*3/MM3 (ref 0–0.2)
BASOPHILS NFR BLD AUTO: 0.5 % (ref 0–1.5)
BUN SERPL-MCNC: 15 MG/DL (ref 6–20)
BUN SERPL-MCNC: ABNORMAL MG/DL
BUN/CREAT SERPL: ABNORMAL
CALCIUM SPEC-SCNC: 6.3 MG/DL (ref 8.6–10.5)
CHLORIDE SERPL-SCNC: 106 MMOL/L (ref 98–107)
CO2 SERPL-SCNC: 21 MMOL/L (ref 22–29)
CREAT SERPL-MCNC: 4.47 MG/DL (ref 0.57–1)
DEPRECATED RDW RBC AUTO: 57.8 FL (ref 37–54)
EOSINOPHIL # BLD AUTO: 0.1 10*3/MM3 (ref 0–0.4)
EOSINOPHIL NFR BLD AUTO: 3.1 % (ref 0.3–6.2)
ERYTHROCYTE [DISTWIDTH] IN BLOOD BY AUTOMATED COUNT: 17.9 % (ref 12.3–15.4)
GFR SERPL CREATININE-BSD FRML MDRD: 11 ML/MIN/1.73
GFR SERPL CREATININE-BSD FRML MDRD: ABNORMAL ML/MIN/{1.73_M2}
GLUCOSE BLDC GLUCOMTR-MCNC: 88 MG/DL (ref 70–105)
GLUCOSE BLDC GLUCOMTR-MCNC: 89 MG/DL (ref 70–105)
GLUCOSE SERPL-MCNC: 90 MG/DL (ref 65–99)
HCT VFR BLD AUTO: 28.2 % (ref 34–46.6)
HGB BLD-MCNC: 9.3 G/DL (ref 12–15.9)
LYMPHOCYTES # BLD AUTO: 1.2 10*3/MM3 (ref 0.7–3.1)
LYMPHOCYTES NFR BLD AUTO: 29.5 % (ref 19.6–45.3)
MAGNESIUM SERPL-MCNC: 1.5 MG/DL (ref 1.6–2.6)
MCH RBC QN AUTO: 30.2 PG (ref 26.6–33)
MCHC RBC AUTO-ENTMCNC: 32.9 G/DL (ref 31.5–35.7)
MCV RBC AUTO: 91.7 FL (ref 79–97)
MONOCYTES # BLD AUTO: 0.4 10*3/MM3 (ref 0.1–0.9)
MONOCYTES NFR BLD AUTO: 8.9 % (ref 5–12)
NEUTROPHILS NFR BLD AUTO: 2.3 10*3/MM3 (ref 1.7–7)
NEUTROPHILS NFR BLD AUTO: 58 % (ref 42.7–76)
NRBC BLD AUTO-RTO: 0.1 /100 WBC (ref 0–0.2)
PHOSPHATE SERPL-MCNC: 3.8 MG/DL (ref 2.5–4.5)
PLATELET # BLD AUTO: 156 10*3/MM3 (ref 140–450)
PMV BLD AUTO: 8.9 FL (ref 6–12)
POTASSIUM SERPL-SCNC: 2.6 MMOL/L (ref 3.5–5.2)
RBC # BLD AUTO: 3.08 10*6/MM3 (ref 3.77–5.28)
SODIUM SERPL-SCNC: 139 MMOL/L (ref 136–145)
WBC # BLD AUTO: 4 10*3/MM3 (ref 3.4–10.8)

## 2020-09-12 PROCEDURE — 80069 RENAL FUNCTION PANEL: CPT | Performed by: INTERNAL MEDICINE

## 2020-09-12 PROCEDURE — 25010000002 EPOETIN ALFA-EPBX 3000 UNIT/ML SOLUTION 1 ML VIAL: Performed by: INTERNAL MEDICINE

## 2020-09-12 PROCEDURE — 85025 COMPLETE CBC W/AUTO DIFF WBC: CPT | Performed by: INTERNAL MEDICINE

## 2020-09-12 PROCEDURE — 25010000002 ONDANSETRON PER 1 MG: Performed by: INTERNAL MEDICINE

## 2020-09-12 PROCEDURE — 25010000002 PROMETHAZINE PER 50 MG: Performed by: HOSPITALIST

## 2020-09-12 PROCEDURE — 82962 GLUCOSE BLOOD TEST: CPT

## 2020-09-12 PROCEDURE — 25010000002 HEPARIN (PORCINE) PER 1000 UNITS: Performed by: INTERNAL MEDICINE

## 2020-09-12 PROCEDURE — 25010000002 EPOETIN ALFA-EPBX 2000 UNIT/ML SOLUTION 1 ML VIAL: Performed by: INTERNAL MEDICINE

## 2020-09-12 PROCEDURE — 99232 SBSQ HOSP IP/OBS MODERATE 35: CPT | Performed by: HOSPITALIST

## 2020-09-12 PROCEDURE — 83735 ASSAY OF MAGNESIUM: CPT | Performed by: INTERNAL MEDICINE

## 2020-09-12 PROCEDURE — 25010000002 DIPHENHYDRAMINE PER 50 MG: Performed by: INTERNAL MEDICINE

## 2020-09-12 PROCEDURE — 25010000002 LEVETIRACETAM IN NACL 0.82% 500 MG/100ML SOLUTION: Performed by: INTERNAL MEDICINE

## 2020-09-12 RX ORDER — DIPHENHYDRAMINE HYDROCHLORIDE 50 MG/ML
12.5 INJECTION INTRAMUSCULAR; INTRAVENOUS ONCE
Status: COMPLETED | OUTPATIENT
Start: 2020-09-12 | End: 2020-09-12

## 2020-09-12 RX ORDER — POTASSIUM CHLORIDE 20 MEQ/1
40 TABLET, EXTENDED RELEASE ORAL ONCE
Status: COMPLETED | OUTPATIENT
Start: 2020-09-12 | End: 2020-09-12

## 2020-09-12 RX ADMIN — EPOETIN ALFA-EPBX 5000 UNITS: 3000 INJECTION, SOLUTION INTRAVENOUS; SUBCUTANEOUS at 10:29

## 2020-09-12 RX ADMIN — DEXTROSE MONOHYDRATE 35 ML/HR: 10 INJECTION, SOLUTION INTRAVENOUS at 21:05

## 2020-09-12 RX ADMIN — PANTOPRAZOLE SODIUM 40 MG: 40 TABLET, DELAYED RELEASE ORAL at 05:33

## 2020-09-12 RX ADMIN — AMITRIPTYLINE HYDROCHLORIDE 25 MG: 25 TABLET, FILM COATED ORAL at 20:10

## 2020-09-12 RX ADMIN — LEVETIRACETAM 500 MG: 5 INJECTION INTRAVENOUS at 08:23

## 2020-09-12 RX ADMIN — DIPHENHYDRAMINE HYDROCHLORIDE 12.5 MG: 50 INJECTION, SOLUTION INTRAMUSCULAR; INTRAVENOUS at 10:28

## 2020-09-12 RX ADMIN — GABAPENTIN 200 MG: 100 CAPSULE ORAL at 08:20

## 2020-09-12 RX ADMIN — ROPINIROLE 0.25 MG: 0.25 TABLET, FILM COATED ORAL at 20:10

## 2020-09-12 RX ADMIN — LOPERAMIDE HYDROCHLORIDE 2 MG: 2 CAPSULE ORAL at 17:41

## 2020-09-12 RX ADMIN — LOSARTAN POTASSIUM 100 MG: 50 TABLET, FILM COATED ORAL at 08:20

## 2020-09-12 RX ADMIN — GABAPENTIN 200 MG: 100 CAPSULE ORAL at 20:10

## 2020-09-12 RX ADMIN — AMLODIPINE BESYLATE 10 MG: 5 TABLET ORAL at 08:20

## 2020-09-12 RX ADMIN — ONDANSETRON 4 MG: 2 INJECTION INTRAMUSCULAR; INTRAVENOUS at 09:58

## 2020-09-12 RX ADMIN — SODIUM CHLORIDE 100 MG: 900 INJECTION, SOLUTION INTRAVENOUS at 08:23

## 2020-09-12 RX ADMIN — GABAPENTIN 200 MG: 100 CAPSULE ORAL at 15:47

## 2020-09-12 RX ADMIN — LEVETIRACETAM 500 MG: 5 INJECTION INTRAVENOUS at 20:10

## 2020-09-12 RX ADMIN — CEFDINIR 300 MG: 125 POWDER, FOR SUSPENSION ORAL at 15:47

## 2020-09-12 RX ADMIN — HEPARIN SODIUM 5000 UNITS: 5000 INJECTION INTRAVENOUS; SUBCUTANEOUS at 05:33

## 2020-09-12 RX ADMIN — HYDROCODONE BITARTRATE AND ACETAMINOPHEN 1 TABLET: 10; 325 TABLET ORAL at 11:25

## 2020-09-12 RX ADMIN — HYDRALAZINE HYDROCHLORIDE 50 MG: 25 TABLET, FILM COATED ORAL at 14:28

## 2020-09-12 RX ADMIN — HEPARIN SODIUM 5000 UNITS: 5000 INJECTION INTRAVENOUS; SUBCUTANEOUS at 14:28

## 2020-09-12 RX ADMIN — HEPARIN SODIUM 5000 UNITS: 5000 INJECTION INTRAVENOUS; SUBCUTANEOUS at 21:04

## 2020-09-12 RX ADMIN — Medication 10 ML: at 20:10

## 2020-09-12 RX ADMIN — POTASSIUM CHLORIDE 40 MEQ: 1500 TABLET, EXTENDED RELEASE ORAL at 09:11

## 2020-09-12 RX ADMIN — ONDANSETRON 4 MG: 2 INJECTION INTRAMUSCULAR; INTRAVENOUS at 20:10

## 2020-09-12 RX ADMIN — Medication 10 ML: at 08:20

## 2020-09-12 RX ADMIN — ESCITALOPRAM OXALATE 20 MG: 10 TABLET ORAL at 08:20

## 2020-09-12 RX ADMIN — SODIUM CHLORIDE 100 MG: 900 INJECTION, SOLUTION INTRAVENOUS at 20:13

## 2020-09-12 RX ADMIN — HYDROCODONE BITARTRATE AND ACETAMINOPHEN 1 TABLET: 10; 325 TABLET ORAL at 19:34

## 2020-09-12 RX ADMIN — LOPERAMIDE HYDROCHLORIDE 2 MG: 2 CAPSULE ORAL at 09:58

## 2020-09-12 NOTE — PROGRESS NOTES
PULMONARY/ CRITICAL CARE PROGRESS NOTE        Patient Name:  Maura Garibay    :  1975    Medical Record:  [id number]    PRIMARY CARE PHYSICIAN     Danial Landeros MD HOPI  Maura Garibay is a 45 y.o. female with a PMH significant for ESRD on HD MWF, seizures, hypertension, GERD, hyperparathyroidism, IBS, anemia, hyperphosphatemia, HFpEF, depression, TTP, anxiety/depression and tobacco abuse who presented to the ER after being found by her neighbor shaking with seizure like activity.  EMS was called and when they arrived she was some jerking of her extremities.  Upon arrival to the ER she was evaluated and had seizure like activity and was given two doses of Ativan and a gram of Keppra.  Currently patient is unable to provide details regarding her medical history due to being obtunded.        :  No new issues.  No seizures since admitted.  Awakens with voice, but not oriented.  :  No issues overnight.  No seizure activity seen.  Awakens to voice and follows commands today.  On Cardene gtt   :  More awake today, but is confused and combative.  On D10 and Cardene gtt   9/10:  Off Cardene gtt.  Receiving HD today.  Remains with hypoglycemia and is on D10 gtt.  More awake, but confused.  Some delusions.  She is refusing treatments and medications   no SOA/CP   No new complains     REVIEW OF SYSTEMS    Difficult to obtain due to mental status    HOME MEDICATIONS  Amitriptyline (ELAVIL) 25 MG tablet qhs    Aspirin 81 MG qday    AURYXIA 1  MG(Fe) tablet two tablets with meals    calcitriol (ROCALTROL) 0.25 MCG capsule 1 capsule by mouth 3 (three) times a week     cinacalcet (SENSIPAR) 30 MG tablet  by mouth 3 (three) times a week    cloNIDine (CATAPRES) 0.2 mg by mouth 3 (three) times daily     dilTIAZem ER beads (TIAZAC) 240 MG 24 hr capsule QD    diphenhydrAMINE (BENADRYL) 50 MG   every 4 (four) hours as needed for Itching    docusate sodium (COLACE) 100 MG capsule QD    escitalopram  (LEXAPRO) 10 MG tablet QD    esomeprazole (NEXIUM) 40 MG QD    gabapentin (NEURONTIN) 600 MG tablet mg 4 (four) times daily    hydrALAZINE (APRESOLINE) 100 MG tablet BID    lacosamide (VIMPAT) 100 mg BID    levETIRAcetam 500 mg BID    linaclotide (LINZESS) 290 MCG QD    metoclopramide 5 MG one tablet four times daily before meals and nightly    promethazine 25 mg by mouth every 6 hours as needed for nausea    Requip 0.25 MG tablet nightly    TRAZODONE HCL PO 50 mg nightly    MEDICAL HISTORY    Past Medical History:   Diagnosis Date   • (HFpEF) heart failure with preserved ejection fraction (CMS/HCC)    • Acid reflux    • Anxiety and depression    • Cardiomyopathy (CMS/HCC)    • Chronic pain    • Dependence on renal dialysis (CMS/HCC)    • Hyperparathyroidism (CMS/HCC)    • Hypertension    • IBS (irritable bowel syndrome)    • Migraines    • Neuropathy    • Pulmonary emboli (CMS/HCC)    • Renal failure    • Seizures (CMS/HCC)    • TTP (thrombotic thrombocytopenic purpura) (CMS/HCC)         SURGICAL HISTORY    Past Surgical History:   Procedure Laterality Date   • APPENDECTOMY     • ARTERIOVENOUS FISTULA     • BRAIN SURGERY      Fistula placed   • BRAIN SURGERY      Cyst   • CHOLECYSTECTOMY     • COLONOSCOPY     • HYSTERECTOMY     • SALPINGO OOPHORECTOMY     • TONSILLECTOMY          FAMILY HISTORY    Family History   Problem Relation Age of Onset   • Kidney disease Mother    • Hypertension Mother    • Heart disease Father    • Heart disease Sister    • Heart disease Brother         SOCIAL HISTORY    Social History     Socioeconomic History   • Marital status:      Spouse name: Not on file   • Number of children: Not on file   • Years of education: Not on file   • Highest education level: Not on file   Tobacco Use   • Smoking status: Current Every Day Smoker     Packs/day: 0.50   Substance and Sexual Activity   • Alcohol use: No   • Drug use: No        ALLERGIES    Allergies   Allergen Reactions   • Butorphanol  Hives   • Codeine    • Contrast Dye      IV CONTRAST   • Golytely [Peg 3350-Electrolytes] Unknown - Low Severity   • Haldol [Haloperidol] Hives   • Morphine And Related    • Sulfa Antibiotics    • Toradol [Ketorolac Tromethamine]    • Vancomycin Hives         PHYSICAL EXAM    tMax 24 hrs:  Temp (24hrs), Av.8 °F (36.6 °C), Min:97.4 °F (36.3 °C), Max:98.7 °F (37.1 °C)    Vitals Ranges:  Temp:  [97.4 °F (36.3 °C)-98.7 °F (37.1 °C)] 97.9 °F (36.6 °C)  Heart Rate:  [63-98] 77  Resp:  [12-18] 12  BP: (106-220)/() 164/113  Intake and Output Last 3 Shifts:  I/O last 3 completed shifts:  In: 755 [P.O.:555; IV Piggyback:200]  Out: -     Intake/Output Summary (Last 24 hours) at 2020 1536  Last data filed at 2020 1336  Gross per 24 hour   Intake 705 ml   Output 2000 ml   Net -1295 ml       Constitutional:  NAD  HEENT:   Atraumatic, PERRL, conjunctiva normal, moist oral mucosa, no nasal discharge.  Trachea is midline.  Respiratory:   No respiratory distress, normal breath sounds, no rales, no wheezing   Cardiovascular:  Tachy. Pulses 2+ and equal in all four extremities.   HARSH fistula with positive thrill and bruit.    GI:   Soft, nondistended, positive bowel sounds.  Extremities:   No edema, cyanosis or tenderness.  Integument:   No rashes  Neurologic:  Awake, intermittently confused.  Some delusions.  Calm      LABS    Lab Results (last 24 hours)     Procedure Component Value Units Date/Time    BUN [[id number]]  (Normal) Collected: 20 0531    Specimen: Blood Updated: 20 1144     BUN 15 mg/dL     CBC & Differential [[id number]]  (Abnormal) Collected: 20 0822    Specimen: Blood Updated: 20 0958    Narrative:      The following orders were created for panel order CBC & Differential.  Procedure                               Abnormality         Status                     ---------                               -----------         ------                     CBC Auto Differential[[id number]]         Abnormal            Final result                 Please view results for these tests on the individual orders.    CBC Auto Differential [[id number]]  (Abnormal) Collected: 09/12/20 0822    Specimen: Blood Updated: 09/12/20 0958     WBC 4.00 10*3/mm3      RBC 3.08 10*6/mm3      Hemoglobin 9.3 g/dL      Hematocrit 28.2 %      MCV 91.7 fL      MCH 30.2 pg      MCHC 32.9 g/dL      RDW 17.9 %      RDW-SD 57.8 fl      MPV 8.9 fL      Platelets 156 10*3/mm3      Neutrophil % 58.0 %      Lymphocyte % 29.5 %      Monocyte % 8.9 %      Eosinophil % 3.1 %      Basophil % 0.5 %      Neutrophils, Absolute 2.30 10*3/mm3      Lymphocytes, Absolute 1.20 10*3/mm3      Monocytes, Absolute 0.40 10*3/mm3      Eosinophils, Absolute 0.10 10*3/mm3      Basophils, Absolute 0.00 10*3/mm3      nRBC 0.1 /100 WBC     Renal Function Panel [[id number]]  (Abnormal) Collected: 09/12/20 0531    Specimen: Blood Updated: 09/12/20 0656     Glucose 90 mg/dL      BUN --     Comment: Testing performed by alternate method        Creatinine 4.47 mg/dL      Sodium 139 mmol/L      Potassium 2.6 mmol/L      Chloride 106 mmol/L      CO2 21.0 mmol/L      Calcium 6.3 mg/dL      Albumin 2.40 g/dL      Phosphorus 3.8 mg/dL      Anion Gap 12.0 mmol/L      BUN/Creatinine Ratio --     Comment: Testing not performed        eGFR Non African Amer 11 mL/min/1.73      Comment: <15 Indicative of kidney failure.        eGFR   Amer --     Comment: <15 Indicative of kidney failure.       Narrative:      GFR Normal >60  Chronic Kidney Disease <60  Kidney Failure <15      Magnesium [[id number]]  (Abnormal) Collected: 09/12/20 0531    Specimen: Blood Updated: 09/12/20 0643     Magnesium 1.5 mg/dL     POC Glucose Once [[id number]]  (Normal) Collected: 09/12/20 0557    Specimen: Blood Updated: 09/12/20 0559     Glucose 88 mg/dL      Comment: Serial Number: [id number]:  [id number]       Blood Culture - Blood, Blood, Central Line [[id number]] Collected: 09/07/20 1606     Specimen: Blood, Central Line Updated: 09/12/20 0502     Blood Culture No growth at 5 days    POC Glucose Once [[id number]]  (Normal) Collected: 09/12/20 0013    Specimen: Blood Updated: 09/12/20 0435     Glucose 89 mg/dL      Comment: Serial Number: [id number]:  [id number]       Blood Culture - Blood, Hand, Left [[id number]] Collected: 09/06/20 2349    Specimen: Blood from Hand, Left Updated: 09/12/20 0045     Blood Culture No growth at 5 days    Blood Culture - Blood, Blood, Central Line [[id number]] Collected: 09/06/20 2351    Specimen: Blood, Central Line Updated: 09/12/20 0030     Blood Culture No growth at 5 days    POC Glucose Once [[id number]]  (Abnormal) Collected: 09/11/20 1755    Specimen: Blood Updated: 09/11/20 1804     Glucose 146 mg/dL      Comment: Serial Number: [id number]:  [id number]             MICRO:  Microbiology Results (last 10 days)     Procedure Component Value - Date/Time    Blood Culture - Blood, Blood, Central Line [[id number]] Collected: 09/07/20 0448    Lab Status: Final result Specimen: Blood, Central Line Updated: 09/12/20 0502     Blood Culture No growth at 5 days    Blood Culture - Blood, Blood, Central Line [[id number]] Collected: 09/06/20 2351    Lab Status: Final result Specimen: Blood, Central Line Updated: 09/12/20 0030     Blood Culture No growth at 5 days    MRSA Screen, PCR (Inpatient) - Swab, Nares [[id number]]  (Normal) Collected: 09/06/20 2350    Lab Status: Final result Specimen: Swab from Nares Updated: 09/07/20 0256     MRSA PCR No MRSA Detected    Blood Culture - Blood, Hand, Left [[id number]] Collected: 09/06/20 2349    Lab Status: Final result Specimen: Blood from Hand, Left Updated: 09/12/20 0045     Blood Culture No growth at 5 days    COVID-19,[facility] IN-HOUSE,Nasal Swab No Transport Media 3-4 HR TAT - Swab, Nasal Cavity [[id number]]  (Normal) Collected: 09/06/20 2148    Lab Status: Final result Specimen: Swab from Nasal Cavity Updated: 09/06/20 2225      COVID19 Not Detected    Narrative:      Fact sheet for providers: https://www.fda.gov/media/051703/download     Fact sheet for patients: https://www.fda.gov/media/465400/download          IMAGING & OTHER STUDIES    Imaging Results (Last 72 Hours)     ** No results found for the last 72 hours. **            ASSESSMENT/PLAN  Seizure (CMS/HCC)  -Patient with history of same  -Keppra and Vimpat at outpatient  -?compliance  -Continue Keppra and Vimpat     Hypertensive emergency  -/129 on admission  -Cardene gtt off  -Clonidine, Norvasc     Pneumonia  -? aspiration  -Given Cefepime and Clindamycin given h/o seizures. Then Unasyn. Now Ceftin.   -COVID19 swab Negative  -MRSA nasal swab Negative    Encephalopathy  -CT head negative for acute findings  -? postictal state vs hypertensive encephalopathy  -APAP < 5  -psych eval    ESRD with HD dependence  -Will consult Dr. Tello = sees Dr. Rod Ch  -HD per Renal.     Anemia  -Hemoglobin 10.6 on admission  -No signs of active bleeding  -EGD 07/14/20 at [facility] without acute findings    Hyperparathyroidism  -Continue Sensipar and Calcitriol per renal    Anxiety/depression  -Continue Lexapro and Requip    PUD: Protonix  Insulin:   Sliding scale  VTE:   SCDs/  Lovenox  Nutrition:  Diet ordered

## 2020-09-12 NOTE — PLAN OF CARE
Problem: Patient Care Overview  Goal: Plan of Care Review  Outcome: Resolved for upgrade, new template will be applied  Goal: Individualization and Mutuality  Outcome: Resolved for upgrade, new template will be applied  Goal: Discharge Needs Assessment  Outcome: Resolved for upgrade, new template will be applied  Goal: Interprofessional Rounds/Family Conf  Outcome: Resolved for upgrade, new template will be applied   Goal Outcome Evaluation:        Problem: Skin Injury Risk (Adult)  Goal: Identify Related Risk Factors and Signs and Symptoms  Outcome: Resolved for upgrade, new template will be applied  Goal: Skin Health and Integrity  Outcome: Resolved for upgrade, new template will be applied  Goal: Identify Related Risk Factors and Signs and Symptoms  Outcome: Resolved for upgrade, new template will be applied  Goal: Skin Health and Integrity  Outcome: Resolved for upgrade, new template will be applied     Problem: Fall Risk (Adult)  Goal: Identify Related Risk Factors and Signs and Symptoms  Outcome: Resolved for upgrade, new template will be applied  Goal: Absence of Fall  Outcome: Resolved for upgrade, new template will be applied     Problem: Patient Care Overview  Goal: Plan of Care Review  Outcome: Resolved for upgrade, new template will be applied  Goal: Individualization and Mutuality  Outcome: Resolved for upgrade, new template will be applied  Goal: Discharge Needs Assessment  Outcome: Resolved for upgrade, new template will be applied  Goal: Interprofessional Rounds/Family Conf  Outcome: Resolved for upgrade, new template will be applied     Problem: Seizure Disorder/Epilepsy (Adult)  Goal: Signs and Symptoms of Listed Potential Problems Will be Absent, Minimized or Managed (Seizure Disorder/Epilepsy)  Outcome: Resolved for upgrade, new template will be applied     Problem: Hypertensive Disease/Crisis (Arterial) (Adult)  Goal: Signs and Symptoms of Listed Potential Problems Will be Absent, Minimized or  Managed (Hypertensive Disease/Crisis)  Outcome: Resolved for upgrade, new template will be applied     Problem: Fall Injury Risk  Goal: Absence of Fall and Fall-Related Injury  Outcome: Resolved for upgrade, new template will be applied

## 2020-09-12 NOTE — PLAN OF CARE
Patient's LOC has improved from previous shift.  Pt complained of pain in her legs which has improved after pain medication given.  Pt has been increasing the amount of food she has been eating and requesting snacks.  Vital signs stable, will continue to monitor.   Problem: Skin Injury Risk (Adult)  Goal: Identify Related Risk Factors and Signs and Symptoms  Outcome: Ongoing (interventions implemented as appropriate)  Goal: Skin Health and Integrity  Outcome: Ongoing (interventions implemented as appropriate)  Goal: Identify Related Risk Factors and Signs and Symptoms  Outcome: Ongoing (interventions implemented as appropriate)  Goal: Skin Health and Integrity  Outcome: Ongoing (interventions implemented as appropriate)     Problem: Fall Risk (Adult)  Goal: Identify Related Risk Factors and Signs and Symptoms  Outcome: Ongoing (interventions implemented as appropriate)  Goal: Absence of Fall  Outcome: Ongoing (interventions implemented as appropriate)     Problem: Patient Care Overview  Goal: Plan of Care Review  Outcome: Ongoing (interventions implemented as appropriate)  Goal: Individualization and Mutuality  Outcome: Ongoing (interventions implemented as appropriate)  Goal: Discharge Needs Assessment  Outcome: Ongoing (interventions implemented as appropriate)  Goal: Interprofessional Rounds/Family Conf  Outcome: Ongoing (interventions implemented as appropriate)     Problem: Seizure Disorder/Epilepsy (Adult)  Goal: Signs and Symptoms of Listed Potential Problems Will be Absent, Minimized or Managed (Seizure Disorder/Epilepsy)  Outcome: Ongoing (interventions implemented as appropriate)     Problem: Hypertensive Disease/Crisis (Arterial) (Adult)  Goal: Signs and Symptoms of Listed Potential Problems Will be Absent, Minimized or Managed (Hypertensive Disease/Crisis)  Outcome: Ongoing (interventions implemented as appropriate)

## 2020-09-12 NOTE — PROGRESS NOTES
Nutrition Services    Patient Name:  Maura Garibay  YOB: 1975  MRN: [id number]  Admit Date:  9/6/2020    Progress note:    TF not infusing. Per nursing her mentation has improved & eating more of her meals. 50% x 1 meal documented yesterday. Pt does not like the [facility] shakes.     Plan:    Dcing TF orders given improvement in PO  Adding Boost Plus BID btw meals   Adding magic cups at L/D     Labs reviewed:  Results from last 7 days   Lab Units 09/12/20  0531 09/11/20  0325 09/10/20  0457  09/08/20  0644  09/06/20  1906   SODIUM mmol/L 139 136 139   < > 142   < > 143   POTASSIUM mmol/L 2.6* 3.7 4.1   < > 6.3*   < > 5.7*   CHLORIDE mmol/L 106 93* 98   < > 101   < > 101   CO2 mmol/L 21.0* 28.0 24.0   < > 18.0*   < > 25.0   BUN  15 13 27*   < > 67*   < > 43*   CREATININE mg/dL 4.47* 3.87* 6.12*   < > 9.32*   < > 7.27*   CALCIUM mg/dL 6.3* 9.1 9.2   < > 9.3   < > 8.8   BILIRUBIN mg/dL  --  0.5  --   --  0.5  --  0.5   ALK PHOS U/L  --  270*  --   --  345*  --  323*   ALT (SGPT) U/L  --  <5  --   --  <5  --  <5   AST (SGOT) U/L  --  14  --   --  9  --  10   GLUCOSE mg/dL 90 109* 116*   < > 73   < > 100*    < > = values in this interval not displayed.       Results from last 7 days   Lab Units 09/12/20  0822 09/12/20  0531 09/11/20  0325 09/10/20  0457   MAGNESIUM mg/dL  --  1.5* 1.9 2.1   PHOSPHORUS mg/dL  --  3.8 4.3 5.0*   HEMOGLOBIN g/dL 9.3*  --  10.2* 10.7*   HEMATOCRIT % 28.2*  --  31.1* 32.3*           Electronically signed by:  Lisa Mcclendon RD  09/12/20 13:26 EDT

## 2020-09-12 NOTE — PROGRESS NOTES
"   LOS: 6 days    Patient Care Team:  Danial Landeros MD as PCP - General (Family Medicine)  Dewey Jack MD as PCP - Claims Attributed      Subjective     Patient was seen this morning  Patient awake and alert.  Able to answer questions appropriately    Objective     Vital Sign Min/Max for last 24 hours  Temp:  [97.6 °F (36.4 °C)-98.7 °F (37.1 °C)] 97.6 °F (36.4 °C)  Heart Rate:  [63-98] 76  Resp:  [14-18] 18  BP: (125-240)/() 142/82                       Flowsheet Rows      First Filed Value   Admission Height  170.2 cm (67\") Documented at 09/06/2020 1756   Admission Weight  81.6 kg (180 lb) Documented at 09/06/2020 1756          I/O this shift:  In: 300 [P.O.:300]  Out: -   I/O last 3 completed shifts:  In: 755 [P.O.:555; IV Piggyback:200]  Out: -     Physical Exam:  Physical Exam    General.  Awake, alert  Head.  Atraumatic, normocephalic  Neck.  Supple  Respiratory.  Clear to auscultation  Cardiovascular.  Regular rhythm  Abdominal.  Obese, soft, Mild generalized tenderness  Extremities.  Trace edema       LABS:  Lab Results   Component Value Date    CALCIUM 6.3 (L) 09/12/2020    PHOS 3.8 09/12/2020     Results from last 7 days   Lab Units 09/12/20  0822 09/12/20  0531 09/11/20  0325 09/10/20  0457 09/09/20  0436  09/08/20  0644  09/06/20  1906   MAGNESIUM mg/dL  --  1.5* 1.9 2.1 2.1  --  2.6   < >  --    SODIUM mmol/L  --  139 136 139 139  --  142   < > 143   POTASSIUM mmol/L  --  2.6* 3.7 4.1 3.9   < > 6.3*   < > 5.7*   CHLORIDE mmol/L  --  106 93* 98 95*  --  101   < > 101   CO2 mmol/L  --  21.0* 28.0 24.0 27.0  --  18.0*   < > 25.0   BUN   --   --  13 27* 17  --  67*   < > 43*   CREATININE mg/dL  --  4.47* 3.87* 6.12* 4.55*  --  9.32*   < > 7.27*   GLUCOSE mg/dL  --  90 109* 116* 96  --  73   < > 100*   CALCIUM mg/dL  --  6.3* 9.1 9.2 9.3  --  9.3   < > 8.8   WBC 10*3/mm3 4.00  --  5.00 5.60 5.60  --  5.80   < > 8.00   HEMOGLOBIN g/dL 9.3*  --  10.2* 10.7* 10.1*  --  10.5*   < > 10.6*   PLATELETS " 10*3/mm3 156  --  161 165 157  --  156   < > 147   ALT (SGPT) U/L  --   --  <5  --   --   --  <5  --  <5   AST (SGOT) U/L  --   --  14  --   --   --  9  --  10    < > = values in this interval not displayed.     Lab Results   Component Value Date    CKTOTAL 52 09/06/2020    TROPONINI 0.027 07/12/2020    TROPONINT 0.035 (C) 09/07/2020     Estimated Creatinine Clearance: 17.4 mL/min (A) (by C-G formula based on SCr of 4.47 mg/dL (H)).      Brief Urine Lab Results  (Last result in the past 365 days)      Color   Clarity   Blood   Leuk Est   Nitrite   Protein   CREAT   Urine HCG        09/06/20 2101 Yellow Clear Moderate (2+) Large (3+) Negative >=300 mg/dL (3+)             WEIGHTS:     Wt Readings from Last 1 Encounters:   09/12/20 0600 68.6 kg (151 lb 3.8 oz)   09/11/20 1817 69.9 kg (154 lb 1.6 oz)   09/11/20 0611 68.3 kg (150 lb 9.2 oz)   09/10/20 0551 65.8 kg (145 lb 1 oz)   09/09/20 0400 65.7 kg (144 lb 13.5 oz)   09/08/20 0400 66.6 kg (146 lb 13.2 oz)   09/07/20 0400 65.5 kg (144 lb 6.4 oz)   09/06/20 2210 65.5 kg (144 lb 6.4 oz)   09/06/20 1756 81.6 kg (180 lb)       amitriptyline, 25 mg, Oral, Nightly  amLODIPine, 10 mg, Oral, Q24H  cefdinir, 300 mg, Oral, Once per day on Tue Thu Sat  cloNIDine, 1 patch, Transdermal, Weekly  epoetin jaskaran/jaskaran-epbx, 5,000 Units, Intravenous, Once  escitalopram, 20 mg, Oral, Daily  gabapentin, 200 mg, Oral, TID  heparin (porcine), 5,000 Units, Subcutaneous, Q8H  insulin lispro, 0-7 Units, Subcutaneous, Q6H  lacosamide (VIMPAT) IVPB, 100 mg, Intravenous, Q12H  levETIRAcetam, 500 mg, Intravenous, Q12H  losartan, 100 mg, Oral, Q24H  pantoprazole, 40 mg, Oral, Q AM  promethazine, 12.5 mg, Intravenous, Once  rOPINIRole, 0.25 mg, Oral, Nightly  sodium chloride, 10 mL, Intravenous, Q12H      dextrose, 35 mL/hr, Last Rate: 35 mL/hr (09/10/20 0937)        Assessment/Plan     1.  ESRD  Hemodialysis today  Hypokalemia noticed.  Increase potassium bath with dialysis  Volume status okay    2.   Hypertension with ESRD  Blood pressure improving  Continue current antihypertensives    3.  Anemia with ESRD  Epogen with dialysis    4.  Seizures  Neurology following    5.  Hypokalemia  Oral potassium replacement  Ircreased potassium bath with dialysis    Juanjose Tello MD  09/12/20  11:27 EDT

## 2020-09-12 NOTE — PLAN OF CARE
Goal Outcome Evaluation:  Plan of Care Reviewed With: patient  Progress: improving  Outcome Summary: Pt seen at lunch time this date for meal assessment. Pt with tray consisting of hamburger, mashed potatoes and gravy, sprite and icebox pie. Pt independently tore small pieces of her hamburger off before eating them and demonstrated functional mastication. No overt s/s of aspiration observed with any consistency. Vocal quality remained clear across all trials. Pt is tolerating current diet at this time. REC: continue soft to chew and thins, meds as tolerated. **NOTE: pt only really oriented to self. Unaware of who she lives with, her age, how old daughter is, where she is, etc. Pt does not appear appropriate or safe to return home due to cognitive status and would benefit from IP rehab at discharge.

## 2020-09-12 NOTE — PROGRESS NOTES
[facility] Medicine Services Daily Progress Note      Hospitalist Team  LOS 6 days      Patient Care Team:  Danial Landeros MD as PCP - General (Family Medicine)  Dewey Jack MD as PCP - Claims Attributed    Patient Location: 254[address]      Subjective   Subjective   Seen in dialysis unit, denies for any new complaint, no nausea or vomiting,no abdominal pain.     Chief Complaint / Subjective  Chief Complaint   Patient presents with   • Seizures         Brief Synopsis of Hospital Course/HPI       Note taken from Intensivist with additional editing:  Maura Garibay is a 45 y.o. female with a PMH significant for ESRD on HD MWF, seizures, hypertension, GERD, hyperparathyroidism, IBS, anemia, hyperphosphatemia, HFpEF, depression, TTP, anxiety/depression and tobacco abuse who presented to the ER on 9/6/2020 after being found by her neighbor shaking with seizure like activity.  EMS was called and when they arrived she had some jerking of her extremities.  Upon arrival to the ER, she was evaluated and had seizure like activity and was given two doses of Ativan and a gram of Keppra.    Patient was unable to provide history due to being obtunded.  Patient was admitted to ICU for further care and management.     9/7/2020 Neurology was consulted.       9/8/2020 Patient placed on Cardene drip.  Nephrology was consulted due to end stage renal disease.     9/9/2020 Per neurology's note there is question on whether patient is compliant on her medication.  Patient was noted to be on D10 and Cardene drip.     9/10/2020 Patient was taken off Cardene drip.  It was noted patient remained hypoglycemic and D10 was continued.  Patient stable for downgrade.  Hospitalist were consulted for medical management.  Patient will receive dialysis today. Patient is slow to respond.  Patient currently on 2 L nasal cannula.  Patient has complaints of abdominal pain with tenderness.    Date::          ROS      Objective  "  Objective      Vital Signs  Temp:  [97.4 °F (36.3 °C)-98.7 °F (37.1 °C)] 97.9 °F (36.6 °C)  Heart Rate:  [63-98] 77  Resp:  [12-18] 12  BP: (106-220)/() 164/113  Oxygen Therapy  SpO2: 100 %  Pulse Oximetry Type: Intermittent  Device (Oxygen Therapy): nasal cannula  Device (Oxygen Therapy): room air  Flow (L/min): 4  Flowsheet Rows      First Filed Value   Admission Height  170.2 cm (67\") Documented at 09/06/2020 1756   Admission Weight  81.6 kg (180 lb) Documented at 09/06/2020 1756        Intake & Output (last 3 days)       09/09 0701 - 09/10 0700 09/10 0701 - 09/11 0700 09/11 0701 - 09/12 0700 09/12 0701 - 09/13 0700    P.O.   555 300    I.V. (mL/kg) 340 (5.2)       IV Piggyback  150 50     Total Intake(mL/kg) 340 (5.2) 150 (2.2) 605 (8.8) 300 (4.4)    Urine (mL/kg/hr)        Other  3400  2000    Stool        Total Output  3400  2000    Net +340 -3250 +605 -1700            Stool Unmeasured Occurrence 1 x 2 x 3 x         Lines, Drains & Airways    Active LDAs     Name:   Placement date:   Placement time:   Site:   Days:    Peripheral IV 09/10/20 1348 Left;Posterior Wrist   09/10/20    1348    Wrist   less than 1    Single Lumen Implantable Port 09/07/20 Left Chest   09/07/20    0329    Chest   4                  Physical Exam:    Physical Exam   Constitutional: She is oriented to person, place, and time. She appears well-developed and well-nourished. No distress.   HENT:   Head: Normocephalic and atraumatic.   Right Ear: External ear normal.   Left Ear: External ear normal.   Nose: Nose normal.   Mouth/Throat: Oropharynx is clear and moist. No oropharyngeal exudate.   Eyes: Pupils are equal, round, and reactive to light. Conjunctivae and EOM are normal. Right eye exhibits no discharge. Left eye exhibits no discharge. No scleral icterus.   Neck: Normal range of motion. No JVD present. No tracheal deviation present. No thyromegaly present.   Cardiovascular: Normal rate, regular rhythm, normal heart sounds and " intact distal pulses. Exam reveals no gallop and no friction rub.   No murmur heard.  Pulmonary/Chest: Effort normal and breath sounds normal. No stridor. No respiratory distress. She has no wheezes. She has no rales. She exhibits no tenderness.   Abdominal: Soft. Bowel sounds are normal. She exhibits no distension and no mass. There is no abdominal tenderness. There is no rebound and no guarding. No hernia.   Musculoskeletal: Normal range of motion. No tenderness, deformity or edema.   Lymphadenopathy:     She has no cervical adenopathy.   Neurological: She is alert and oriented to person, place, and time. No cranial nerve deficit or sensory deficit. She exhibits normal muscle tone. Coordination normal.   Skin: Skin is warm and dry. No rash noted. She is not diaphoretic. No erythema.   Psychiatric: She has a normal mood and affect. Her behavior is normal.   Nursing note and vitals reviewed.            Procedures:              Results Review:     I reviewed the patient's new clinical results.      Lab Results (last 24 hours)     Procedure Component Value Units Date/Time    BUN [[id number]]  (Normal) Collected: 09/12/20 0531    Specimen: Blood Updated: 09/12/20 1144     BUN 15 mg/dL     CBC & Differential [[id number]]  (Abnormal) Collected: 09/12/20 0822    Specimen: Blood Updated: 09/12/20 0958    Narrative:      The following orders were created for panel order CBC & Differential.  Procedure                               Abnormality         Status                     ---------                               -----------         ------                     CBC Auto Differential[[id number]]        Abnormal            Final result                 Please view results for these tests on the individual orders.    CBC Auto Differential [[id number]]  (Abnormal) Collected: 09/12/20 0822    Specimen: Blood Updated: 09/12/20 0958     WBC 4.00 10*3/mm3      RBC 3.08 10*6/mm3      Hemoglobin 9.3 g/dL      Hematocrit 28.2 %      MCV  91.7 fL      MCH 30.2 pg      MCHC 32.9 g/dL      RDW 17.9 %      RDW-SD 57.8 fl      MPV 8.9 fL      Platelets 156 10*3/mm3      Neutrophil % 58.0 %      Lymphocyte % 29.5 %      Monocyte % 8.9 %      Eosinophil % 3.1 %      Basophil % 0.5 %      Neutrophils, Absolute 2.30 10*3/mm3      Lymphocytes, Absolute 1.20 10*3/mm3      Monocytes, Absolute 0.40 10*3/mm3      Eosinophils, Absolute 0.10 10*3/mm3      Basophils, Absolute 0.00 10*3/mm3      nRBC 0.1 /100 WBC     Renal Function Panel [[id number]]  (Abnormal) Collected: 09/12/20 0531    Specimen: Blood Updated: 09/12/20 0656     Glucose 90 mg/dL      BUN --     Comment: Testing performed by alternate method        Creatinine 4.47 mg/dL      Sodium 139 mmol/L      Potassium 2.6 mmol/L      Chloride 106 mmol/L      CO2 21.0 mmol/L      Calcium 6.3 mg/dL      Albumin 2.40 g/dL      Phosphorus 3.8 mg/dL      Anion Gap 12.0 mmol/L      BUN/Creatinine Ratio --     Comment: Testing not performed        eGFR Non African Amer 11 mL/min/1.73      Comment: <15 Indicative of kidney failure.        eGFR   Amer --     Comment: <15 Indicative of kidney failure.       Narrative:      GFR Normal >60  Chronic Kidney Disease <60  Kidney Failure <15      Magnesium [[id number]]  (Abnormal) Collected: 09/12/20 0531    Specimen: Blood Updated: 09/12/20 0643     Magnesium 1.5 mg/dL     POC Glucose Once [[id number]]  (Normal) Collected: 09/12/20 0557    Specimen: Blood Updated: 09/12/20 0559     Glucose 88 mg/dL      Comment: Serial Number: [id number]:  [id number]       Blood Culture - Blood, Blood, Central Line [[id number]] Collected: 09/07/20 0448    Specimen: Blood, Central Line Updated: 09/12/20 0502     Blood Culture No growth at 5 days    POC Glucose Once [[id number]]  (Normal) Collected: 09/12/20 0013    Specimen: Blood Updated: 09/12/20 0435     Glucose 89 mg/dL      Comment: Serial Number: [id number]:  [id number]       Blood Culture - Blood, Hand, Left [[id number]]  Collected: 09/06/20 2349    Specimen: Blood from Hand, Left Updated: 09/12/20 0045     Blood Culture No growth at 5 days    Blood Culture - Blood, Blood, Central Line [[id number]] Collected: 09/06/20 2351    Specimen: Blood, Central Line Updated: 09/12/20 0030     Blood Culture No growth at 5 days    POC Glucose Once [[id number]]  (Abnormal) Collected: 09/11/20 1755    Specimen: Blood Updated: 09/11/20 1804     Glucose 146 mg/dL      Comment: Serial Number: [id number]:  [id number]           No results found for: HGBA1C  Results from last 7 days   Lab Units 09/11/20  0325 09/08/20  0644 09/06/20  1906   INR  1.16* 1.01 1.01           Lab Results   Component Value Date    LIPASE 81 07/13/2020     Lab Results   Component Value Date    CHLPL 196 07/13/2020    TRIG 91 07/13/2020    HDL 40 (L) 07/13/2020     (H) 07/13/2020       No results found for: INTRAOP, PREDX, FINALDX, COMDX    Microbiology Results (last 10 days)     Procedure Component Value - Date/Time    Blood Culture - Blood, Blood, Central Line [[id number]] Collected: 09/07/20 0448    Lab Status: Final result Specimen: Blood, Central Line Updated: 09/12/20 0502     Blood Culture No growth at 5 days    Blood Culture - Blood, Blood, Central Line [[id number]] Collected: 09/06/20 2351    Lab Status: Final result Specimen: Blood, Central Line Updated: 09/12/20 0030     Blood Culture No growth at 5 days    MRSA Screen, PCR (Inpatient) - Swab, Nares [[id number]]  (Normal) Collected: 09/06/20 2350    Lab Status: Final result Specimen: Swab from Nares Updated: 09/07/20 0256     MRSA PCR No MRSA Detected    Blood Culture - Blood, Hand, Left [[id number]] Collected: 09/06/20 2349    Lab Status: Final result Specimen: Blood from Hand, Left Updated: 09/12/20 0045     Blood Culture No growth at 5 days    COVID-19,[facility] IN-HOUSE,Nasal Swab No Transport Media 3-4 HR TAT - Swab, Nasal Cavity [[id number]]  (Normal) Collected: 09/06/20 8821    Lab Status: Final result  Specimen: Swab from Nasal Cavity Updated: 09/06/20 2226     COVID19 Not Detected    Narrative:      Fact sheet for providers: https://www.fda.gov/media/227009/download     Fact sheet for patients: https://www.fda.gov/media/351129/download          ECG/EMG Results (most recent)     Procedure Component Value Units Date/Time    ECG 12 Lead [[id number]] Collected: 09/06/20 1847     Updated: 09/07/20 1422    Narrative:      HEART RATE= 104  bpm  RR Interval= 580  ms  WA Interval= 159  ms  P Horizontal Axis= 51  deg  P Front Axis= 33  deg  QRSD Interval= 97  ms  QT Interval= 359  ms  QRS Axis= 0  deg  T Wave Axis= 90  deg  - ABNORMAL ECG -  Sinus tachycardia  Abnormal T, consider ischemia, lateral leads  ST elev, probable normal early repol pattern  No previous ECG available for comparison  Electronically Signed By: Tee Cotter (CHANTEL) 07-Sep-2020 14:05:51  Date and Time of Study: 2020-09-06 18:47:43                    Ct Head Without Contrast    Result Date: 9/6/2020  1. No acute intracranial abnormality. 2. Changes of prior right frontal craniotomy. Electronically signed by:  Yohannes Guerrero M.D.  9/6/2020 6:29 PM    Xr Chest 1 View    Result Date: 9/7/2020  1.Right subclavian catheter tip now projects at the cavoatrial junction. Left IJ port catheter tip also appears to be an area of the cavoatrial junction. 2.Stable cardiomegaly. 3.No significant interval change in diffuse interstitial and bibasilar airspace opacities. No pneumothorax is seen.  Electronically Signed By-DR. Mani Leo MD On:9/7/2020 11:34 AM This report was finalized on [id number] by DR. Mani Leo MD.    Xr Chest 1 View    Result Date: 9/7/2020  1.Right subclavian catheter projects in the right atrium. Catheter could be withdrawn approximately 6 cm to be in the area of the cavoatrial junction on this exam. 2.Left IJ port catheter terminates in the superior right atrium. 3.No pneumothorax is seen. 4.Cardiomegaly. 5.Diffuse interstitial and  bibasilar airspace opacities which could indicate edema or pneumonia.  Electronically Signed By-DR. Mani Leo MD On:9/7/2020 8:02 AM This report was finalized on [id number] by DR. Mani Leo MD.          Xrays, labs reviewed personally by physician.    Medication Review:   I have reviewed the patient's current medication list      Scheduled Meds  amitriptyline, 25 mg, Oral, Nightly  amLODIPine, 10 mg, Oral, Q24H  cefdinir, 300 mg, Oral, Once per day on Tue Thu Sat  cloNIDine, 1 patch, Transdermal, Weekly  epoetin jaskaran/jaskaran-epbx, 5,000 Units, Intravenous, Once  escitalopram, 20 mg, Oral, Daily  gabapentin, 200 mg, Oral, TID  heparin (porcine), 5,000 Units, Subcutaneous, Q8H  insulin lispro, 0-7 Units, Subcutaneous, Q6H  lacosamide (VIMPAT) IVPB, 100 mg, Intravenous, Q12H  levETIRAcetam, 500 mg, Intravenous, Q12H  losartan, 100 mg, Oral, Q24H  pantoprazole, 40 mg, Oral, Q AM  rOPINIRole, 0.25 mg, Oral, Nightly  sodium chloride, 10 mL, Intravenous, Q12H        Meds Infusions  dextrose, 35 mL/hr, Last Rate: 35 mL/hr (09/10/20 0937)        Meds PRN  •  acetaminophen  •  dextrose  •  dextrose  •  glucagon (human recombinant)  •  hydrALAZINE  •  HYDROcodone-acetaminophen  •  insulin lispro **AND** insulin lispro  •  loperamide  •  ondansetron **OR** ondansetron  •  risperiDONE  •  [COMPLETED] Insert peripheral IV **AND** sodium chloride  •  sodium chloride        Assessment/Plan   Assessment/Plan     Active Hospital Problems    Diagnosis  POA   • **Seizure (CMS/HCC) [R56.9]  Yes   • GERD (gastroesophageal reflux disease) [K21.9]  Yes   • Hyperparathyroidism (CMS/HCC) [E21.3]  Yes   • Benign essential HTN [I10]  Yes   • Pneumonia [J18.9]  Yes   • Hypertensive emergency [I16.1]  Yes   • Acute encephalopathy [G93.40]  Yes   • (HFpEF) heart failure with preserved ejection fraction (CMS/HCC) [I50.30]  Yes   • Depression [F32.9]  Yes   • HTN (hypertension) [I10]  Yes   • Hyperphosphatemia [E83.39]  Yes   • Chronic  diastolic (congestive) heart failure (CMS/[facility]) [I50.32]  Yes   • Tobacco abuse [Z72.0]  Yes   • Anxiety [F41.9]  Yes   • End stage renal failure on dialysis (CMS/[facility]) [N18.6, Z99.2]  Not Applicable      Resolved Hospital Problems   No resolved problems to display.       MEDICAL DECISION MAKING COMPLEXITY BY PROBLEM:     Seizure   - Patient has history and on keppra and vimpat on outpatient  -EEG if no improvement in mental status   -CK level 52  -Continue Keppra 500 mg IV bid and Vimapt 100 mg IV BID and switch to oral when appropriate  -Seizure precautions   -Neurology following-question on whether patient is compliant on medications     Hypertensive emergency-noted BP running high  -Monitor blood pressure  - Continue clonidine patch   - add po hydralazine and norvasc .... Once BP better controlled .... Will discharge the patient.     Pneumonia  - Possible aspiration due to seizure   -Given Cefepime and Clindamycin given h/o seizures. On Unasyn, now changed to oral omnicef on discharge.  -COVID19 swab Negative  -RVP ordered  -Urine antigen for Strep and Legionella pending  -MRSA nasal swab Negative  -Pulmonology following     Acute encephalopathy- Improving  - Slow to respond, currently on alert and oriented X3   -CT head negative for acute findings  - postictal state vs hypertensive encephalopathy  -ASA normal   -APAP < 5  - Neurology following      ESRD with HD dependence  - CR 6.1, BUN 27- monitor   -Nephrology following   -HD planned for today     Anemia of chronic disease   -Hemoglobin 10.6 on admission  -No signs of active bleeding  -EGD 07/14/20 at [facility] without acute findings     HFpEF  - EF unknown   - Reported in patient medical chart      Hyperparathyroidism  -Continue Sensipar and Calcitriol per renal     Hyperphosphatemia  -Continue phos binders per renal     Anxiety/depression  -Continue Lexapro, elavil, and Requip     GERD   - Continue PPI      Tobacco abuse  - Encourage cessation         VTE  Prophylaxis -     VTE Prophylaxis -   Mechanical Order History:      Ordered        09/06/20 2149  Place Sequential Compression Device  Once         09/06/20 2149  Maintain Sequential Compression Device  Continuous                 Pharmalogical Order History:     Ordered     Dose Route Frequency Stop    09/06/20 2149  heparin (porcine) 5000 UNIT/ML injection 5,000 Units      5,000 Units SC Every 8 Hours Scheduled --            Code Status -   Code Status and Medical Interventions:   Ordered at: 09/06/20 2149     Code Status:    CPR     Medical Interventions (Level of Support Prior to Arrest):    Full       This patient has been examined wearing appropriate Personal Protective Equipment and discussed with hospital infection control department. 09/12/20        Discharge Planning            Electronically signed by Danni Amaral MD, 09/12/20, 14:25 EDT.  [facility] Hospitalist Team

## 2020-09-13 VITALS
RESPIRATION RATE: 14 BRPM | HEART RATE: 78 BPM | OXYGEN SATURATION: 99 % | TEMPERATURE: 97.7 F | SYSTOLIC BLOOD PRESSURE: 138 MMHG | DIASTOLIC BLOOD PRESSURE: 90 MMHG | WEIGHT: 179.45 LBS | BODY MASS INDEX: 28.17 KG/M2 | HEIGHT: 67 IN

## 2020-09-13 LAB
ANION GAP SERPL CALCULATED.3IONS-SCNC: 11 MMOL/L (ref 5–15)
ANISOCYTOSIS BLD QL: NORMAL
BUN SERPL-MCNC: 10 MG/DL (ref 6–20)
BUN SERPL-MCNC: ABNORMAL MG/DL
BUN/CREAT SERPL: ABNORMAL
CALCIUM SPEC-SCNC: 9.5 MG/DL (ref 8.6–10.5)
CHLORIDE SERPL-SCNC: 99 MMOL/L (ref 98–107)
CO2 SERPL-SCNC: 24 MMOL/L (ref 22–29)
CREAT SERPL-MCNC: 4.1 MG/DL (ref 0.57–1)
DEPRECATED RDW RBC AUTO: 59.9 FL (ref 37–54)
EOSINOPHIL # BLD MANUAL: 0.18 10*3/MM3 (ref 0–0.4)
EOSINOPHIL NFR BLD MANUAL: 4 % (ref 0.3–6.2)
ERYTHROCYTE [DISTWIDTH] IN BLOOD BY AUTOMATED COUNT: 18.2 % (ref 12.3–15.4)
GFR SERPL CREATININE-BSD FRML MDRD: 12 ML/MIN/1.73
GFR SERPL CREATININE-BSD FRML MDRD: ABNORMAL ML/MIN/{1.73_M2}
GIANT PLATELETS: NORMAL
GLUCOSE BLDC GLUCOMTR-MCNC: 107 MG/DL (ref 70–105)
GLUCOSE BLDC GLUCOMTR-MCNC: 82 MG/DL (ref 70–105)
GLUCOSE SERPL-MCNC: 90 MG/DL (ref 65–99)
HCT VFR BLD AUTO: 28.5 % (ref 34–46.6)
HGB BLD-MCNC: 9.3 G/DL (ref 12–15.9)
LYMPHOCYTES # BLD MANUAL: 1.67 10*3/MM3 (ref 0.7–3.1)
LYMPHOCYTES NFR BLD MANUAL: 38 % (ref 19.6–45.3)
LYMPHOCYTES NFR BLD MANUAL: 7 % (ref 5–12)
MAGNESIUM SERPL-MCNC: 2.1 MG/DL (ref 1.6–2.6)
MCH RBC QN AUTO: 30.3 PG (ref 26.6–33)
MCHC RBC AUTO-ENTMCNC: 32.5 G/DL (ref 31.5–35.7)
MCV RBC AUTO: 93.2 FL (ref 79–97)
MONOCYTES # BLD AUTO: 0.31 10*3/MM3 (ref 0.1–0.9)
NEUTROPHILS # BLD AUTO: 2.2 10*3/MM3 (ref 1.7–7)
NEUTROPHILS NFR BLD MANUAL: 46 % (ref 42.7–76)
NEUTS BAND NFR BLD MANUAL: 4 % (ref 0–5)
PHOSPHATE SERPL-MCNC: 3.9 MG/DL (ref 2.5–4.5)
PLATELET # BLD AUTO: 166 10*3/MM3 (ref 140–450)
PMV BLD AUTO: 9.2 FL (ref 6–12)
POTASSIUM SERPL-SCNC: 4.7 MMOL/L (ref 3.5–5.2)
RBC # BLD AUTO: 3.05 10*6/MM3 (ref 3.77–5.28)
SCAN SLIDE: NORMAL
SODIUM SERPL-SCNC: 134 MMOL/L (ref 136–145)
VARIANT LYMPHS NFR BLD MANUAL: 1 % (ref 0–5)
WBC # BLD AUTO: 4.4 10*3/MM3 (ref 3.4–10.8)
WBC MORPH BLD: NORMAL

## 2020-09-13 PROCEDURE — 25010000003 HEPARIN LOCK FLUSH PER 10 UNITS: Performed by: HOSPITALIST

## 2020-09-13 PROCEDURE — 85025 COMPLETE CBC W/AUTO DIFF WBC: CPT | Performed by: INTERNAL MEDICINE

## 2020-09-13 PROCEDURE — 25010000002 LEVETIRACETAM IN NACL 0.82% 500 MG/100ML SOLUTION: Performed by: INTERNAL MEDICINE

## 2020-09-13 PROCEDURE — 82962 GLUCOSE BLOOD TEST: CPT

## 2020-09-13 PROCEDURE — 99239 HOSP IP/OBS DSCHRG MGMT >30: CPT | Performed by: HOSPITALIST

## 2020-09-13 PROCEDURE — 83735 ASSAY OF MAGNESIUM: CPT | Performed by: INTERNAL MEDICINE

## 2020-09-13 PROCEDURE — 25010000002 HEPARIN (PORCINE) PER 1000 UNITS: Performed by: INTERNAL MEDICINE

## 2020-09-13 PROCEDURE — 80048 BASIC METABOLIC PNL TOTAL CA: CPT | Performed by: INTERNAL MEDICINE

## 2020-09-13 PROCEDURE — 85007 BL SMEAR W/DIFF WBC COUNT: CPT | Performed by: INTERNAL MEDICINE

## 2020-09-13 PROCEDURE — 84100 ASSAY OF PHOSPHORUS: CPT | Performed by: INTERNAL MEDICINE

## 2020-09-13 RX ORDER — AMLODIPINE BESYLATE 10 MG/1
10 TABLET ORAL
Qty: 30 TABLET | Refills: 0 | Status: SHIPPED | OUTPATIENT
Start: 2020-09-14

## 2020-09-13 RX ORDER — HEPARIN SODIUM (PORCINE) LOCK FLUSH IV SOLN 100 UNIT/ML 100 UNIT/ML
500 SOLUTION INTRAVENOUS ONCE
Status: COMPLETED | OUTPATIENT
Start: 2020-09-13 | End: 2020-09-13

## 2020-09-13 RX ORDER — CEFDINIR 125 MG/5ML
300 POWDER, FOR SUSPENSION ORAL DAILY
Qty: 36 ML | Refills: 0 | Status: SHIPPED | OUTPATIENT
Start: 2020-09-13 | End: 2020-09-16

## 2020-09-13 RX ORDER — LOSARTAN POTASSIUM 100 MG/1
100 TABLET ORAL
Qty: 30 TABLET | Refills: 0 | Status: SHIPPED | OUTPATIENT
Start: 2020-09-14

## 2020-09-13 RX ORDER — HYDRALAZINE HYDROCHLORIDE 50 MG/1
50 TABLET, FILM COATED ORAL 3 TIMES DAILY
Qty: 90 TABLET | Refills: 0 | Status: SHIPPED | OUTPATIENT
Start: 2020-09-13

## 2020-09-13 RX ORDER — GABAPENTIN 300 MG/1
300 CAPSULE ORAL NIGHTLY
Status: DISCONTINUED | OUTPATIENT
Start: 2020-09-13 | End: 2020-09-13 | Stop reason: HOSPADM

## 2020-09-13 RX ADMIN — HYDROCODONE BITARTRATE AND ACETAMINOPHEN 1 TABLET: 10; 325 TABLET ORAL at 05:41

## 2020-09-13 RX ADMIN — Medication 10 ML: at 09:42

## 2020-09-13 RX ADMIN — LOSARTAN POTASSIUM 100 MG: 50 TABLET, FILM COATED ORAL at 09:43

## 2020-09-13 RX ADMIN — LEVETIRACETAM 500 MG: 5 INJECTION INTRAVENOUS at 09:43

## 2020-09-13 RX ADMIN — PANTOPRAZOLE SODIUM 40 MG: 40 TABLET, DELAYED RELEASE ORAL at 05:37

## 2020-09-13 RX ADMIN — HEPARIN SODIUM (PORCINE) LOCK FLUSH IV SOLN 100 UNIT/ML 500 UNITS: 100 SOLUTION at 14:45

## 2020-09-13 RX ADMIN — ESCITALOPRAM OXALATE 20 MG: 10 TABLET ORAL at 09:43

## 2020-09-13 RX ADMIN — SODIUM CHLORIDE 100 MG: 900 INJECTION, SOLUTION INTRAVENOUS at 10:32

## 2020-09-13 RX ADMIN — AMLODIPINE BESYLATE 10 MG: 5 TABLET ORAL at 09:43

## 2020-09-13 RX ADMIN — HEPARIN SODIUM 5000 UNITS: 5000 INJECTION INTRAVENOUS; SUBCUTANEOUS at 05:37

## 2020-09-13 NOTE — PLAN OF CARE
Goal Outcome Evaluation:  Plan of Care Reviewed With: patient  Progress: improving  Outcome Summary: Pt's LOC continues to improve.  Pt continues to increase the amount of food she is eating.  Vital signs stable, will continue to mointor.

## 2020-09-13 NOTE — PROGRESS NOTES
Continued Stay Note   [facility]     Patient Name: Maura Garibay  MRN: [id number]  Today's Date: 9/13/2020    Admit Date: 9/6/2020    Discharge Plan     Row Name 09/13/20 1333       Plan    Plan  Declines rehab- pt states she does not need rehab.    Plan Comments  Spoke with pt -she said she got up last night and cleaned up and is steady on her feet. Declines rehab. She said she is rarely alone .              Expected Discharge Date and Time     Expected Discharge Date Expected Discharge Time    Sep 13, 2020             Chani Arango RN

## 2020-09-13 NOTE — PLAN OF CARE
Goal Outcome Evaluation:  Plan of Care Reviewed With: patient  Pt A&Ox3, v/s stable, call light in reach, many snacks, candy, and soda noted in room and on bedside table, pt has been educated on healthy diet choices, RN will cont to monitor.

## 2020-09-13 NOTE — DISCHARGE SUMMARY
[facility] Medicine Services  DISCHARGE SUMMARY        Prepared For PCP:  Danial Landeros MD    Patient Name: Maura Garibay  : 1975  MRN: [id number]      Date of Admission:   2020    Date of Discharge:  2020    Length of stay:  LOS: 7 days     Hospital Course     Presenting Problem:   Seizure (CMS/[facility]) [R56.9]  Hypertensive emergency [I16.1]  Chronic kidney disease with end stage renal failure on dialysis (CMS/[facility]) [N18.6, Z99.2]      Active Hospital Problems    Diagnosis  POA   • **Seizure (CMS/HCC) [R56.9]  Yes   • GERD (gastroesophageal reflux disease) [K21.9]  Yes   • Hyperparathyroidism (CMS/[facility]) [E21.3]  Yes   • Benign essential HTN [I10]  Yes   • Pneumonia [J18.9]  Yes   • Hypertensive emergency [I16.1]  Yes   • Acute encephalopathy [G93.40]  Yes   • (HFpEF) heart failure with preserved ejection fraction (CMS/[facility]) [I50.30]  Yes   • Depression [F32.9]  Yes   • HTN (hypertension) [I10]  Yes   • Hyperphosphatemia [E83.39]  Yes   • Chronic diastolic (congestive) heart failure (CMS/[facility]) [I50.32]  Yes   • Tobacco abuse [Z72.0]  Yes   • Anxiety [F41.9]  Yes   • End stage renal failure on dialysis (CMS/[facility]) [N18.6, Z99.2]  Not Applicable      Resolved Hospital Problems   No resolved problems to display.           Hospital Course by problem list.    Seizure   - Patient has history and on keppra and vimpat on outpatient  -EEG if no improvement in mental status   -CK level 52  -Continue Keppra 500 mg IV bid and Vimapt 100 mg IV BID and switch to oral on discharge.   -Seizure precautions   -Neurology following-question on whether patient is compliant on medications     Hypertensive emergency .......... better controlled now.  -Monitor blood pressure  - Continue clonidine patch   - continue po hydralazine and norvasc .... -   Continue losartan.      Pneumonia  - Possible aspiration due to seizure   -Given Cefepime and Clindamycin given h/o seizures. On Unasyn, now changed to  oral omnicef on discharge for few more days.  -COVID19 swab Negative  -RVP ordered  -Urine antigen for Strep and Legionella pending  -MRSA nasal swab Negative  -Pulmonology following     Acute encephalopathy-resolved now.   - Slow to respond, currently on alert and oriented X3   -CT head negative for acute findings  - postictal state vs hypertensive encephalopathy  -ASA normal   -APAP < 5  - Neurology following      ESRD with HD dependence  - CR 6.1, BUN 27- monitor   -Nephrology following   -HD planned for today     Anemia of chronic disease   -Hemoglobin 10.6 on admission  -No signs of active bleeding  -EGD 07/14/20 at [place] [facility] without acute findings     HFpEF  - EF unknown   - Reported in patient medical chart      Hyperparathyroidism  -Continue Sensipar and Calcitriol per renal     Hyperphosphatemia  -Continue phos binders per renal     Anxiety/depression  -Continue Lexapro, elavil, and Requip     GERD   - Continue PPI      Tobacco abuse  - Encourage cessation           Recommendation for Outpatient Providers:       Follow-up with the family physician and nephrology service outpatient in a week time.        Reasons For Change In Medications and Indications for New Medications:        Day of Discharge     HPI:       Vital Signs:   Temp:  [97.4 °F (36.3 °C)-98.1 °F (36.7 °C)] 97.7 °F (36.5 °C)  Heart Rate:  [63-78] 78  Resp:  [12-17] 14  BP: (106-164)/() 138/90     Physical Exam:  Physical Exam  Vitals signs and nursing note reviewed.   Constitutional:       General: She is not in acute distress.     Appearance: She is well-developed. She is not diaphoretic.   HENT:      Head: Normocephalic and atraumatic.      Right Ear: External ear normal.      Left Ear: External ear normal.      Nose: Nose normal.      Mouth/Throat:      Pharynx: No oropharyngeal exudate.   Eyes:      General: No scleral icterus.        Right eye: No discharge.         Left eye: No discharge.      Conjunctiva/sclera: Conjunctivae normal.       Pupils: Pupils are equal, round, and reactive to light.   Neck:      Musculoskeletal: Normal range of motion.      Thyroid: No thyromegaly.      Vascular: No JVD.      Trachea: No tracheal deviation.   Cardiovascular:      Rate and Rhythm: Normal rate and regular rhythm.      Heart sounds: Normal heart sounds. No murmur. No friction rub. No gallop.    Pulmonary:      Effort: Pulmonary effort is normal. No respiratory distress.      Breath sounds: Normal breath sounds. No stridor. No wheezing or rales.   Chest:      Chest wall: No tenderness.   Abdominal:      General: Bowel sounds are normal. There is no distension.      Palpations: Abdomen is soft. There is no mass.      Tenderness: There is no abdominal tenderness. There is no guarding or rebound.      Hernia: No hernia is present.   Musculoskeletal: Normal range of motion.         General: No tenderness or deformity.   Lymphadenopathy:      Cervical: No cervical adenopathy.   Skin:     General: Skin is warm and dry.      Findings: No erythema or rash.   Neurological:      Mental Status: She is alert and oriented to person, place, and time.      Cranial Nerves: No cranial nerve deficit.      Sensory: No sensory deficit.      Motor: No abnormal muscle tone.      Coordination: Coordination normal.   Psychiatric:         Behavior: Behavior normal.         Pertinent  and/or Most Recent Results     Results from last 7 days   Lab Units 09/13/20  0545 09/12/20  0822 09/12/20  0531 09/11/20  0325 09/10/20  0457 09/09/20  0436 09/08/20  1119 09/08/20  0644 09/07/20  0448   WBC 10*3/mm3 4.40 4.00  --  5.00 5.60 5.60  --  5.80 5.80   HEMOGLOBIN g/dL 9.3* 9.3*  --  10.2* 10.7* 10.1*  --  10.5* 9.9*   HEMATOCRIT % 28.5* 28.2*  --  31.1* 32.3* 31.2*  --  33.0* 30.3*   PLATELETS 10*3/mm3 166 156  --  161 165 157  --  156 134*   SODIUM mmol/L 134*  --  139 136 139 139  --  142 142   POTASSIUM mmol/L 4.7  --  2.6* 3.7 4.1 3.9 6.1* 6.3* 4.8   CHLORIDE mmol/L 99  --  106 93* 98  95*  --  101 102   CO2 mmol/L 24.0  --  21.0* 28.0 24.0 27.0  --  18.0* 25.0   BUN  10  --  15 13 27* 17  --  67* 47*   CREATININE mg/dL 4.10*  --  4.47* 3.87* 6.12* 4.55*  --  9.32* 7.78*   GLUCOSE mg/dL 90  --  90 109* 116* 96  --  73 90   CALCIUM mg/dL 9.5  --  6.3* 9.1 9.2 9.3  --  9.3 8.6     Results from last 7 days   Lab Units 09/11/20  0325 09/08/20  0644 09/06/20  1906   BILIRUBIN mg/dL 0.5 0.5 0.5   ALK PHOS U/L 270* 345* 323*   ALT (SGPT) U/L <5 <5 <5   AST (SGOT) U/L 14 9 10   PROTIME Seconds 12.5* 11.0 10.9   INR  1.16* 1.01 1.01   APTT seconds 30.1 27.7 26.5           Invalid input(s): TG, LDLCALC, LDLREALC  Results from last 7 days   Lab Units 09/07/20  0448 09/06/20  1906   TSH uIU/mL 0.562  --    HEMOGLOBIN A1C % 5.3  --    TROPONIN T ng/mL 0.035* 0.034*       Brief Urine Lab Results  (Last result in the past 365 days)      Color   Clarity   Blood   Leuk Est   Nitrite   Protein   CREAT   Urine HCG        09/06/20 2101 Yellow Clear Moderate (2+) Large (3+) Negative >=300 mg/dL (3+)               Microbiology Results Abnormal     Procedure Component Value - Date/Time    Blood Culture - Blood, Blood, Central Line [[id number]] Collected: 09/07/20 0448    Lab Status: Final result Specimen: Blood, Central Line Updated: 09/12/20 0502     Blood Culture No growth at 5 days    Blood Culture - Blood, Hand, Left [[id number]] Collected: 09/06/20 2349    Lab Status: Final result Specimen: Blood from Hand, Left Updated: 09/12/20 0045     Blood Culture No growth at 5 days    Blood Culture - Blood, Blood, Central Line [[id number]] Collected: 09/06/20 2351    Lab Status: Final result Specimen: Blood, Central Line Updated: 09/12/20 0030     Blood Culture No growth at 5 days    MRSA Screen, PCR (Inpatient) - Swab, Nares [[id number]]  (Normal) Collected: 09/06/20 2350    Lab Status: Final result Specimen: Swab from Nares Updated: 09/07/20 0256     MRSA PCR No MRSA Detected    COVID-19,[facility] IN-HOUSE,Nasal Swab No  Transport Media 3-4 HR TAT - Swab, Nasal Cavity [[id number]]  (Normal) Collected: 09/06/20 2148    Lab Status: Final result Specimen: Swab from Nasal Cavity Updated: 09/06/20 2226     COVID19 Not Detected    Narrative:      Fact sheet for providers: https://www.fda.gov/media/082769/download     Fact sheet for patients: https://www.fda.gov/media/620977/download          Ct Head Without Contrast    Result Date: 9/6/2020  Impression: 1. No acute intracranial abnormality. 2. Changes of prior right frontal craniotomy. Electronically signed by:  Yohannes Guerrero M.D.  9/6/2020 6:29 PM    Xr Chest 1 View    Result Date: 9/7/2020  Impression: 1.Right subclavian catheter tip now projects at the cavoatrial junction. Left IJ port catheter tip also appears to be an area of the cavoatrial junction. 2.Stable cardiomegaly. 3.No significant interval change in diffuse interstitial and bibasilar airspace opacities. No pneumothorax is seen.  Electronically Signed By-DR. Mani Leo MD On:9/7/2020 11:34 AM This report was finalized on [id number] by DR. Mani Leo MD.    Xr Chest 1 View    Result Date: 9/7/2020  Impression: 1.Right subclavian catheter projects in the right atrium. Catheter could be withdrawn approximately 6 cm to be in the area of the cavoatrial junction on this exam. 2.Left IJ port catheter terminates in the superior right atrium. 3.No pneumothorax is seen. 4.Cardiomegaly. 5.Diffuse interstitial and bibasilar airspace opacities which could indicate edema or pneumonia.  Electronically Signed By-DR. Mani Leo MD On:9/7/2020 8:02 AM This report was finalized on [id number] by DR. Mani Leo MD.                             Test Results Pending at Discharge        Procedures Performed           Consults:   Consults     Date and Time Order Name Status Description    9/10/2020 1137 Inpatient Hospitalist Consult      9/10/2020 1136 Inpatient Psychiatrist Consult Completed     9/7/2020 0972 Inpatient Nephrology  Consult Completed     9/7/2020 0922 Inpatient Neurology Consult General Completed     9/7/2020 0030 Inpatient Nephrology Consult Completed     9/6/2020 2100 Intensivist (on-call MD unless specified) Completed             Discharge Details        Discharge Medications      New Medications      Instructions Start Date   amLODIPine 10 MG tablet  Commonly known as: NORVASC   10 mg, Oral, Every 24 Hours Scheduled   Start Date: September 14, 2020     cefdinir 125 MG/5ML suspension  Commonly known as: OMNICEF   125 mg, Oral, Daily      cefdinir 125 MG/5ML suspension  Commonly known as: OMNICEF   300 mg, Oral, Daily      hydrALAZINE 50 MG tablet  Commonly known as: APRESOLINE   50 mg, Oral, 3 Times Daily      lacosamide 100 MG tablet tablet  Commonly known as: Vimpat   100 mg, Oral, Every 12 Hours Scheduled      levETIRAcetam 500 MG tablet  Commonly known as: Keppra   500 mg, Oral, 2 Times Daily      losartan 100 MG tablet  Commonly known as: COZAAR   100 mg, Oral, Every 24 Hours Scheduled   Start Date: September 14, 2020        Continue These Medications      Instructions Start Date   amitriptyline 25 MG tablet  Commonly known as: ELAVIL   25 mg, Oral, Nightly      cloNIDine 0.2 MG tablet  Commonly known as: CATAPRES   0.2 mg, Oral, 2 Times Daily      escitalopram 20 MG tablet  Commonly known as: LEXAPRO   20 mg, Oral, Daily      gabapentin 600 MG tablet  Commonly known as: NEURONTIN   600 mg, Oral, 3 Times Daily      pantoprazole 20 MG EC tablet  Commonly known as: PROTONIX   20 mg, Oral, 2 times daily      rOPINIRole 0.25 MG tablet  Commonly known as: REQUIP   0.25 mg, Oral, Nightly, Take 1 hour before bedtime.          Stop These Medications    HYDROcodone-acetaminophen  MG per tablet  Commonly known as: NORCO     lisinopril 40 MG tablet  Commonly known as: PRINIVIL,ZESTRIL     promethazine 25 MG tablet  Commonly known as: PHENERGAN            Allergies   Allergen Reactions   • Butorphanol Hives   • Codeine    •  Contrast Dye      IV CONTRAST   • Golytely [Peg 3350-Electrolytes] Unknown - Low Severity   • Haldol [Haloperidol] Hives   • Morphine And Related    • Sulfa Antibiotics    • Toradol [Ketorolac Tromethamine]    • Vancomycin Hives         Discharge Disposition:  Home-Health Care [facility]    Diet:  Hospital:  Diet Order   Procedures   • Diet Texture; Soft to Chew         Discharge Activity:         CODE STATUS:    Code Status and Medical Interventions:   Ordered at: 09/06/20 2149     Code Status:    CPR     Medical Interventions (Level of Support Prior to Arrest):    Full         Follow-up Appointments  No future appointments.          Condition on Discharge:      Stable      This patient has been examined wearing appropriate Personal Protective Equipment and discussed with hospital infection control department. 09/13/20      Electronically signed by Danni Amaral MD, 09/13/20, 12:42 PM EDT.      Time: I spent  33 minutes on this discharge activity which included face-to-face encounter with the patient/reviewing the data in the system/coordination of the care with the nursing staff as well as consultants/documentation/entering orders.

## 2020-09-13 NOTE — PROGRESS NOTES
"   LOS: 7 days    Patient Care Team:  Danial Landeros MD as PCP - General (Family Medicine)  Dewey Jack MD as PCP - Claims Attributed      Subjective     Patient feeling better.  Patient has some nausea but improving  Denies any chest pain, shortness of breath    Objective     Vital Sign Min/Max for last 24 hours  Temp:  [97.4 °F (36.3 °C)-98.1 °F (36.7 °C)] 97.7 °F (36.5 °C)  Heart Rate:  [63-78] 78  Resp:  [12-17] 14  BP: (106-164)/() 138/90                       Flowsheet Rows      First Filed Value   Admission Height  170.2 cm (67\") Documented at 09/06/2020 1756   Admission Weight  81.6 kg (180 lb) Documented at 09/06/2020 1756          I/O this shift:  In: 240 [P.O.:240]  Out: -   I/O last 3 completed shifts:  In: 3406.3 [P.O.:300; I.V.:3006.3; IV Piggyback:100]  Out: 2000 [Other:2000]    Physical Exam:  Physical Exam    General.  Awake, alert  Head.  Atraumatic, normocephalic  Neck.  Supple  Respiratory.  Clear to auscultation  Cardiovascular.  Regular rhythm  Abdominal.  Obese, soft, Mild generalized tenderness  Extremities.  Trace edema       LABS:  Lab Results   Component Value Date    CALCIUM 9.5 09/13/2020    PHOS 3.9 09/13/2020     Results from last 7 days   Lab Units 09/13/20  0545 09/12/20  0822 09/12/20  0531 09/11/20  0325 09/10/20  0457  09/08/20  0644  09/06/20  1906   MAGNESIUM mg/dL 2.1  --  1.5* 1.9 2.1   < > 2.6   < >  --    SODIUM mmol/L 134*  --  139 136 139   < > 142   < > 143   POTASSIUM mmol/L 4.7  --  2.6* 3.7 4.1   < > 6.3*   < > 5.7*   CHLORIDE mmol/L 99  --  106 93* 98   < > 101   < > 101   CO2 mmol/L 24.0  --  21.0* 28.0 24.0   < > 18.0*   < > 25.0   BUN   --   --  15 13 27*   < > 67*   < > 43*   CREATININE mg/dL 4.10*  --  4.47* 3.87* 6.12*   < > 9.32*   < > 7.27*   GLUCOSE mg/dL 90  --  90 109* 116*   < > 73   < > 100*   CALCIUM mg/dL 9.5  --  6.3* 9.1 9.2   < > 9.3   < > 8.8   WBC 10*3/mm3 4.40 4.00  --  5.00 5.60   < > 5.80   < > 8.00   HEMOGLOBIN g/dL 9.3* 9.3*  --  " 10.2* 10.7*   < > 10.5*   < > 10.6*   PLATELETS 10*3/mm3 166 156  --  161 165   < > 156   < > 147   ALT (SGPT) U/L  --   --   --  <5  --   --  <5  --  <5   AST (SGOT) U/L  --   --   --  14  --   --  9  --  10    < > = values in this interval not displayed.     Lab Results   Component Value Date    CKTOTAL 52 09/06/2020    TROPONINI 0.027 07/12/2020    TROPONINT 0.035 (C) 09/07/2020     Estimated Creatinine Clearance: 19.2 mL/min (A) (by C-G formula based on SCr of 4.1 mg/dL (H)).      Brief Urine Lab Results  (Last result in the past 365 days)      Color   Clarity   Blood   Leuk Est   Nitrite   Protein   CREAT   Urine HCG        09/06/20 2101 Yellow Clear Moderate (2+) Large (3+) Negative >=300 mg/dL (3+)             WEIGHTS:     Wt Readings from Last 1 Encounters:   09/13/20 0555 81.4 kg (179 lb 7.3 oz)   09/12/20 0600 68.6 kg (151 lb 3.8 oz)   09/11/20 1817 69.9 kg (154 lb 1.6 oz)   09/11/20 0611 68.3 kg (150 lb 9.2 oz)   09/10/20 0551 65.8 kg (145 lb 1 oz)   09/09/20 0400 65.7 kg (144 lb 13.5 oz)   09/08/20 0400 66.6 kg (146 lb 13.2 oz)   09/07/20 0400 65.5 kg (144 lb 6.4 oz)   09/06/20 2210 65.5 kg (144 lb 6.4 oz)   09/06/20 1756 81.6 kg (180 lb)       amitriptyline, 25 mg, Oral, Nightly  amLODIPine, 10 mg, Oral, Q24H  cefdinir, 300 mg, Oral, Once per day on Tue Thu Sat  cloNIDine, 1 patch, Transdermal, Weekly  epoetin jaskaran/jaskaran-epbx, 5,000 Units, Intravenous, Once  escitalopram, 20 mg, Oral, Daily  gabapentin, 300 mg, Oral, Nightly  heparin (porcine), 5,000 Units, Subcutaneous, Q8H  insulin lispro, 0-7 Units, Subcutaneous, Q6H  lacosamide (VIMPAT) IVPB, 100 mg, Intravenous, Q12H  levETIRAcetam, 500 mg, Intravenous, Q12H  losartan, 100 mg, Oral, Q24H  pantoprazole, 40 mg, Oral, Q AM  rOPINIRole, 0.25 mg, Oral, Nightly  sodium chloride, 10 mL, Intravenous, Q12H           Assessment/Plan     1.  ESRD  Hemodialysis tomorrow  Electrolytes, volume status okay    2.  Hypertension with ESRD  Blood pressure  improving  Continue current antihypertensives    3.  Anemia with ESRD  Epogen with dialysis    4.  Seizures  Neurology following    5.  Hypokalemia  Improved    Juanjose Tello MD  09/13/20  10:24 EDT

## 2020-09-14 ENCOUNTER — READMISSION MANAGEMENT (OUTPATIENT)
Dept: CALL CENTER | Facility: HOSPITAL | Age: 45
End: 2020-09-14

## 2020-09-14 NOTE — OUTREACH NOTE
Prep Survey      Responses   [facility] facility patient discharged from?  [facility]   Is LACE score < 7 ?  No   Eligibility  Readm Mgmt   Discharge diagnosis  Seizure, GERD, PNA, Acute encephalopathy, ESRD with HD dependence   COVID-19 Test Status  Negative   Does the patient have one of the following disease processes/diagnoses(primary or secondary)?  COPD/Pneumonia   Does the patient have Home health ordered?  Yes   What is the Home health agency?   [facility]   Is there a DME ordered?  No   Comments regarding appointments  Per AVS   Prep survey completed?  Yes          Selene Bacon RN

## 2020-09-14 NOTE — PROGRESS NOTES
Case Management Discharge Note                Selected Continued Care - Discharged on 9/13/2020 Admission date: 9/6/2020 - Discharge disposition: Home-Health Care [facility] [place] [facility]                 Final Discharge Disposition Code: 01 - home or self-care

## 2020-09-17 ENCOUNTER — READMISSION MANAGEMENT (OUTPATIENT)
Dept: CALL CENTER | Facility: HOSPITAL | Age: 45
End: 2020-09-17

## 2020-09-17 NOTE — OUTREACH NOTE
COPD/PN Week 1 Survey      Responses   [facility] patient discharged from?  [facility]   COVID-19 Test Status  Negative   Does the patient have one of the following disease processes/diagnoses(primary or secondary)?  COPD/Pneumonia   Is there a successful TCM telephone encounter documented?  No   Was the primary reason for admission:  Pneumonia   Week 1 attempt successful?  Yes   Call start time  1109   Call end time  1114   Discharge diagnosis  Seizure, GERD, PNA, Acute encephalopathy, ESRD with HD dependence   Meds reviewed with patient/caregiver?  Yes   Is the patient having any side effects they believe may be caused by any medication additions or changes?  No   Does the patient have all medications ordered at discharge?  Yes   Is the patient taking all medications as directed (includes completed medication regime)?  Yes   Does the patient have a primary care provider?   Yes   Does the patient have an appointment with their PCP or pulmonologist within 7 days of discharge?  Yes   Has the patient kept scheduled appointments due by today?  N/A   What is the Home health agency?   [facility]   Has home health visited the patient within 72 hours of discharge?  No   Home health comments  Doesn't have home health   Pulse Ox monitoring  None   Psychosocial issues?  No   Did the patient receive a copy of their discharge instructions?  Yes   What is the patient's perception of their health status since discharge?  Improving   Is the patient/caregiver able to teach back importance of completing antibiotic course of treatment?  Yes   Week 1 call completed?  Yes   Wrap up additional comments  Improving.  Dialysis patient.          Klaudia Dao RN

## 2020-09-24 ENCOUNTER — READMISSION MANAGEMENT (OUTPATIENT)
Dept: CALL CENTER | Facility: HOSPITAL | Age: 45
End: 2020-09-24

## 2020-09-24 NOTE — OUTREACH NOTE
COPD/PN Week 2 Survey      Responses   [facility] patient discharged from?  [facility]   COVID-19 Test Status  Negative   Does the patient have one of the following disease processes/diagnoses(primary or secondary)?  COPD/Pneumonia   Was the primary reason for admission:  Pneumonia   Week 2 attempt successful?  No   Unsuccessful attempts  Attempt 1          Candi Dean RN

## 2020-09-25 ENCOUNTER — READMISSION MANAGEMENT (OUTPATIENT)
Dept: CALL CENTER | Facility: HOSPITAL | Age: 45
End: 2020-09-25

## 2020-09-25 NOTE — OUTREACH NOTE
COPD/PN Week 2 Survey      Responses   [facility] [place] patient discharged from?  [facility]   COVID-19 Test Status  Negative   Does the patient have one of the following disease processes/diagnoses(primary or secondary)?  COPD/Pneumonia   Was the primary reason for admission:  Pneumonia   Week 2 attempt successful?  No   Unsuccessful attempts  Attempt 2          Bay Dozier RN

## 2020-10-01 ENCOUNTER — READMISSION MANAGEMENT (OUTPATIENT)
Dept: CALL CENTER | Facility: HOSPITAL | Age: 45
End: 2020-10-01

## 2020-10-01 NOTE — OUTREACH NOTE
COPD/PN Week 3 Survey      Responses   [facility] patient discharged from?  [facility]   Does the patient have one of the following disease processes/diagnoses(primary or secondary)?  COPD/Pneumonia   Week 3 attempt successful?  No   Unsuccessful attempts  Attempt 1          Lola Perez RN

## 2020-10-05 ENCOUNTER — READMISSION MANAGEMENT (OUTPATIENT)
Dept: CALL CENTER | Facility: HOSPITAL | Age: 45
End: 2020-10-05

## 2020-10-05 NOTE — OUTREACH NOTE
COPD/PN Week 3 Survey      Responses   [facility] facility patient discharged from?  [facility]   Does the patient have one of the following disease processes/diagnoses(primary or secondary)?  COPD/Pneumonia   Was the primary reason for admission:  Pneumonia   Week 3 attempt successful?  No   Unsuccessful attempts  Attempt 2          Esther Jacob RN

## 2021-03-08 ENCOUNTER — INPATIENT HOSPITAL (OUTPATIENT)
Dept: URBAN - METROPOLITAN AREA HOSPITAL 107 | Facility: HOSPITAL | Age: 46
End: 2021-03-08
Payer: COMMERCIAL

## 2021-03-08 DIAGNOSIS — K92.0 HEMATEMESIS: ICD-10-CM

## 2021-03-08 DIAGNOSIS — D50.0 IRON DEFICIENCY ANEMIA SECONDARY TO BLOOD LOSS (CHRONIC): ICD-10-CM

## 2021-03-08 DIAGNOSIS — N25.81 SECONDARY HYPERPARATHYROIDISM OF RENAL ORIGIN: ICD-10-CM

## 2021-03-08 DIAGNOSIS — N18.6 END STAGE RENAL DISEASE: ICD-10-CM

## 2021-03-08 DIAGNOSIS — K21.9 GASTRO-ESOPHAGEAL REFLUX DISEASE WITHOUT ESOPHAGITIS: ICD-10-CM

## 2021-03-08 DIAGNOSIS — R13.10 DYSPHAGIA, UNSPECIFIED: ICD-10-CM

## 2021-03-08 DIAGNOSIS — K31.84 GASTROPARESIS: ICD-10-CM

## 2021-03-08 PROCEDURE — 99222 1ST HOSP IP/OBS MODERATE 55: CPT

## 2021-03-09 ENCOUNTER — INPATIENT HOSPITAL (OUTPATIENT)
Dept: URBAN - METROPOLITAN AREA HOSPITAL 107 | Facility: HOSPITAL | Age: 46
End: 2021-03-09
Payer: COMMERCIAL

## 2021-03-09 DIAGNOSIS — K29.50 UNSPECIFIED CHRONIC GASTRITIS WITHOUT BLEEDING: ICD-10-CM

## 2021-03-09 DIAGNOSIS — K25.9 GASTRIC ULCER, UNSPECIFIED AS ACUTE OR CHRONIC, WITHOUT HEMO: ICD-10-CM

## 2021-03-09 DIAGNOSIS — K22.4 DYSKINESIA OF ESOPHAGUS: ICD-10-CM

## 2021-03-09 DIAGNOSIS — R11.2 NAUSEA WITH VOMITING, UNSPECIFIED: ICD-10-CM

## 2021-03-09 DIAGNOSIS — K92.0 HEMATEMESIS: ICD-10-CM

## 2021-03-09 DIAGNOSIS — K31.84 GASTROPARESIS: ICD-10-CM

## 2021-03-09 PROCEDURE — 43249 ESOPH EGD DILATION <30 MM: CPT

## 2021-03-09 PROCEDURE — 43239 EGD BIOPSY SINGLE/MULTIPLE: CPT | Mod: 59

## 2021-03-31 ENCOUNTER — INPATIENT HOSPITAL (OUTPATIENT)
Dept: URBAN - METROPOLITAN AREA HOSPITAL 107 | Facility: HOSPITAL | Age: 46
End: 2021-03-31
Payer: COMMERCIAL

## 2021-03-31 DIAGNOSIS — K92.0 HEMATEMESIS: ICD-10-CM

## 2021-03-31 DIAGNOSIS — R07.89 OTHER CHEST PAIN: ICD-10-CM

## 2021-03-31 DIAGNOSIS — R13.10 DYSPHAGIA, UNSPECIFIED: ICD-10-CM

## 2021-03-31 DIAGNOSIS — K59.00 CONSTIPATION, UNSPECIFIED: ICD-10-CM

## 2021-03-31 PROCEDURE — 99223 1ST HOSP IP/OBS HIGH 75: CPT | Performed by: INTERNAL MEDICINE

## 2021-04-01 ENCOUNTER — INPATIENT HOSPITAL (OUTPATIENT)
Dept: URBAN - METROPOLITAN AREA HOSPITAL 107 | Facility: HOSPITAL | Age: 46
End: 2021-04-01
Payer: COMMERCIAL

## 2021-04-01 DIAGNOSIS — K92.0 HEMATEMESIS: ICD-10-CM

## 2021-04-01 DIAGNOSIS — R10.9 UNSPECIFIED ABDOMINAL PAIN: ICD-10-CM

## 2021-04-01 PROCEDURE — 99232 SBSQ HOSP IP/OBS MODERATE 35: CPT | Performed by: PHYSICIAN ASSISTANT

## 2021-04-02 ENCOUNTER — INPATIENT HOSPITAL (OUTPATIENT)
Dept: URBAN - METROPOLITAN AREA HOSPITAL 107 | Facility: HOSPITAL | Age: 46
End: 2021-04-02
Payer: COMMERCIAL

## 2021-04-02 DIAGNOSIS — K92.0 HEMATEMESIS: ICD-10-CM

## 2021-04-02 DIAGNOSIS — N18.6 END STAGE RENAL DISEASE: ICD-10-CM

## 2021-04-02 DIAGNOSIS — R10.9 UNSPECIFIED ABDOMINAL PAIN: ICD-10-CM

## 2021-04-02 PROCEDURE — 99232 SBSQ HOSP IP/OBS MODERATE 35: CPT

## 2021-04-03 ENCOUNTER — INPATIENT HOSPITAL (OUTPATIENT)
Dept: URBAN - METROPOLITAN AREA HOSPITAL 107 | Facility: HOSPITAL | Age: 46
End: 2021-04-03
Payer: COMMERCIAL

## 2021-04-03 DIAGNOSIS — K59.00 CONSTIPATION, UNSPECIFIED: ICD-10-CM

## 2021-04-03 DIAGNOSIS — R07.89 OTHER CHEST PAIN: ICD-10-CM

## 2021-04-03 DIAGNOSIS — R13.10 DYSPHAGIA, UNSPECIFIED: ICD-10-CM

## 2021-04-03 DIAGNOSIS — N18.6 END STAGE RENAL DISEASE: ICD-10-CM

## 2021-04-03 PROCEDURE — 99232 SBSQ HOSP IP/OBS MODERATE 35: CPT | Performed by: INTERNAL MEDICINE

## 2021-04-04 ENCOUNTER — INPATIENT HOSPITAL (OUTPATIENT)
Dept: URBAN - METROPOLITAN AREA HOSPITAL 107 | Facility: HOSPITAL | Age: 46
End: 2021-04-04
Payer: COMMERCIAL

## 2021-04-04 DIAGNOSIS — R13.10 DYSPHAGIA, UNSPECIFIED: ICD-10-CM

## 2021-04-04 DIAGNOSIS — R07.89 OTHER CHEST PAIN: ICD-10-CM

## 2021-04-04 DIAGNOSIS — N18.6 END STAGE RENAL DISEASE: ICD-10-CM

## 2021-04-04 DIAGNOSIS — K59.00 CONSTIPATION, UNSPECIFIED: ICD-10-CM

## 2021-04-04 PROCEDURE — 99232 SBSQ HOSP IP/OBS MODERATE 35: CPT | Performed by: INTERNAL MEDICINE

## 2021-04-05 ENCOUNTER — INPATIENT HOSPITAL (OUTPATIENT)
Dept: URBAN - METROPOLITAN AREA HOSPITAL 107 | Facility: HOSPITAL | Age: 46
End: 2021-04-05
Payer: COMMERCIAL

## 2021-04-05 DIAGNOSIS — T18.2XXA FOREIGN BODY IN STOMACH, INITIAL ENCOUNTER: ICD-10-CM

## 2021-04-05 DIAGNOSIS — R13.10 DYSPHAGIA, UNSPECIFIED: ICD-10-CM

## 2021-04-05 PROCEDURE — 43450 DILATE ESOPHAGUS 1/MULT PASS: CPT | Performed by: INTERNAL MEDICINE

## 2021-04-05 PROCEDURE — 43235 EGD DIAGNOSTIC BRUSH WASH: CPT | Performed by: INTERNAL MEDICINE

## 2021-04-20 ENCOUNTER — INPATIENT HOSPITAL (OUTPATIENT)
Dept: URBAN - METROPOLITAN AREA HOSPITAL 107 | Facility: HOSPITAL | Age: 46
End: 2021-04-20
Payer: COMMERCIAL

## 2021-04-20 DIAGNOSIS — R10.30 LOWER ABDOMINAL PAIN, UNSPECIFIED: ICD-10-CM

## 2021-04-20 DIAGNOSIS — K62.5 HEMORRHAGE OF ANUS AND RECTUM: ICD-10-CM

## 2021-04-20 PROCEDURE — 99223 1ST HOSP IP/OBS HIGH 75: CPT | Performed by: INTERNAL MEDICINE

## 2021-04-21 ENCOUNTER — INPATIENT HOSPITAL (OUTPATIENT)
Dept: URBAN - METROPOLITAN AREA HOSPITAL 107 | Facility: HOSPITAL | Age: 46
End: 2021-04-21
Payer: COMMERCIAL

## 2021-04-21 DIAGNOSIS — K62.5 HEMORRHAGE OF ANUS AND RECTUM: ICD-10-CM

## 2021-04-21 DIAGNOSIS — R10.30 LOWER ABDOMINAL PAIN, UNSPECIFIED: ICD-10-CM

## 2021-04-21 DIAGNOSIS — K64.8 OTHER HEMORRHOIDS: ICD-10-CM

## 2021-04-21 PROCEDURE — 45378 DIAGNOSTIC COLONOSCOPY: CPT | Performed by: INTERNAL MEDICINE

## 2021-05-04 ENCOUNTER — INPATIENT HOSPITAL (OUTPATIENT)
Dept: URBAN - METROPOLITAN AREA HOSPITAL 107 | Facility: HOSPITAL | Age: 46
End: 2021-05-04
Payer: COMMERCIAL

## 2021-05-04 DIAGNOSIS — D64.89 OTHER SPECIFIED ANEMIAS: ICD-10-CM

## 2021-05-04 DIAGNOSIS — K92.0 HEMATEMESIS: ICD-10-CM

## 2021-05-04 DIAGNOSIS — R13.10 DYSPHAGIA, UNSPECIFIED: ICD-10-CM

## 2021-05-04 PROCEDURE — 99223 1ST HOSP IP/OBS HIGH 75: CPT

## 2021-05-05 ENCOUNTER — INPATIENT HOSPITAL (OUTPATIENT)
Dept: URBAN - METROPOLITAN AREA HOSPITAL 107 | Facility: HOSPITAL | Age: 46
End: 2021-05-05
Payer: COMMERCIAL

## 2021-05-05 DIAGNOSIS — K92.0 HEMATEMESIS: ICD-10-CM

## 2021-05-05 DIAGNOSIS — D50.0 IRON DEFICIENCY ANEMIA SECONDARY TO BLOOD LOSS (CHRONIC): ICD-10-CM

## 2021-05-05 DIAGNOSIS — R13.10 DYSPHAGIA, UNSPECIFIED: ICD-10-CM

## 2021-05-05 PROCEDURE — 99231 SBSQ HOSP IP/OBS SF/LOW 25: CPT | Performed by: PHYSICIAN ASSISTANT

## 2021-05-27 ENCOUNTER — ON CAMPUS - OUTPATIENT (OUTPATIENT)
Dept: URBAN - METROPOLITAN AREA HOSPITAL 108 | Facility: HOSPITAL | Age: 46
End: 2021-05-27
Payer: COMMERCIAL

## 2021-05-27 DIAGNOSIS — N18.6 END STAGE RENAL DISEASE: ICD-10-CM

## 2021-05-27 DIAGNOSIS — K92.0 HEMATEMESIS: ICD-10-CM

## 2021-05-27 DIAGNOSIS — D64.89 OTHER SPECIFIED ANEMIAS: ICD-10-CM

## 2021-05-27 DIAGNOSIS — R10.9 UNSPECIFIED ABDOMINAL PAIN: ICD-10-CM

## 2021-05-27 PROCEDURE — 99214 OFFICE O/P EST MOD 30 MIN: CPT | Performed by: PHYSICIAN ASSISTANT

## 2021-05-28 PROCEDURE — 99213 OFFICE O/P EST LOW 20 MIN: CPT | Performed by: PHYSICIAN ASSISTANT

## 2021-06-29 ENCOUNTER — ON CAMPUS - OUTPATIENT (OUTPATIENT)
Dept: URBAN - METROPOLITAN AREA HOSPITAL 108 | Facility: HOSPITAL | Age: 46
End: 2021-06-29
Payer: COMMERCIAL

## 2021-06-29 DIAGNOSIS — D64.89 OTHER SPECIFIED ANEMIAS: ICD-10-CM

## 2021-06-29 DIAGNOSIS — N18.6 END STAGE RENAL DISEASE: ICD-10-CM

## 2021-06-29 DIAGNOSIS — K92.0 HEMATEMESIS: ICD-10-CM

## 2021-06-29 DIAGNOSIS — R10.9 UNSPECIFIED ABDOMINAL PAIN: ICD-10-CM

## 2021-06-29 PROCEDURE — 99214 OFFICE O/P EST MOD 30 MIN: CPT | Performed by: PHYSICIAN ASSISTANT

## 2021-06-30 ENCOUNTER — ON CAMPUS - OUTPATIENT (OUTPATIENT)
Dept: URBAN - METROPOLITAN AREA HOSPITAL 108 | Facility: HOSPITAL | Age: 46
End: 2021-06-30
Payer: COMMERCIAL

## 2021-06-30 DIAGNOSIS — N18.6 END STAGE RENAL DISEASE: ICD-10-CM

## 2021-06-30 DIAGNOSIS — K92.0 HEMATEMESIS: ICD-10-CM

## 2021-06-30 DIAGNOSIS — D64.89 OTHER SPECIFIED ANEMIAS: ICD-10-CM

## 2021-06-30 DIAGNOSIS — R10.9 UNSPECIFIED ABDOMINAL PAIN: ICD-10-CM

## 2021-06-30 PROCEDURE — 99212 OFFICE O/P EST SF 10 MIN: CPT | Performed by: PHYSICIAN ASSISTANT

## 2021-07-29 ENCOUNTER — INPATIENT HOSPITAL (OUTPATIENT)
Dept: URBAN - METROPOLITAN AREA HOSPITAL 107 | Facility: HOSPITAL | Age: 46
End: 2021-07-29
Payer: COMMERCIAL

## 2021-07-29 DIAGNOSIS — D63.8 ANEMIA IN OTHER CHRONIC DISEASES CLASSIFIED ELSEWHERE: ICD-10-CM

## 2021-07-29 DIAGNOSIS — N18.6 END STAGE RENAL DISEASE: ICD-10-CM

## 2021-07-29 DIAGNOSIS — R13.10 DYSPHAGIA, UNSPECIFIED: ICD-10-CM

## 2021-07-29 DIAGNOSIS — Z99.2 DEPENDENCE ON RENAL DIALYSIS: ICD-10-CM

## 2021-07-29 PROCEDURE — 99221 1ST HOSP IP/OBS SF/LOW 40: CPT | Performed by: NURSE PRACTITIONER

## 2021-07-30 ENCOUNTER — INPATIENT HOSPITAL (OUTPATIENT)
Dept: URBAN - METROPOLITAN AREA HOSPITAL 107 | Facility: HOSPITAL | Age: 46
End: 2021-07-30
Payer: COMMERCIAL

## 2021-07-30 DIAGNOSIS — K21.9 GASTRO-ESOPHAGEAL REFLUX DISEASE WITHOUT ESOPHAGITIS: ICD-10-CM

## 2021-07-30 DIAGNOSIS — R13.10 DYSPHAGIA, UNSPECIFIED: ICD-10-CM

## 2021-07-30 DIAGNOSIS — T18.2XXA FOREIGN BODY IN STOMACH, INITIAL ENCOUNTER: ICD-10-CM

## 2021-07-30 PROCEDURE — 43249 ESOPH EGD DILATION <30 MM: CPT | Performed by: INTERNAL MEDICINE

## 2021-08-07 ENCOUNTER — INPATIENT HOSPITAL (OUTPATIENT)
Dept: URBAN - METROPOLITAN AREA HOSPITAL 107 | Facility: HOSPITAL | Age: 46
End: 2021-08-07
Payer: COMMERCIAL

## 2021-08-07 DIAGNOSIS — R11.2 NAUSEA WITH VOMITING, UNSPECIFIED: ICD-10-CM

## 2021-08-07 DIAGNOSIS — N18.9 CHRONIC KIDNEY DISEASE, UNSPECIFIED: ICD-10-CM

## 2021-08-07 DIAGNOSIS — D50.0 IRON DEFICIENCY ANEMIA SECONDARY TO BLOOD LOSS (CHRONIC): ICD-10-CM

## 2021-08-07 DIAGNOSIS — R13.10 DYSPHAGIA, UNSPECIFIED: ICD-10-CM

## 2021-08-07 DIAGNOSIS — K92.1 MELENA: ICD-10-CM

## 2021-08-07 PROCEDURE — 99223 1ST HOSP IP/OBS HIGH 75: CPT | Performed by: INTERNAL MEDICINE

## 2021-08-08 PROCEDURE — 99232 SBSQ HOSP IP/OBS MODERATE 35: CPT | Performed by: INTERNAL MEDICINE

## 2021-08-09 ENCOUNTER — INPATIENT HOSPITAL (OUTPATIENT)
Dept: URBAN - METROPOLITAN AREA HOSPITAL 107 | Facility: HOSPITAL | Age: 46
End: 2021-08-09
Payer: COMMERCIAL

## 2021-08-09 DIAGNOSIS — K25.9 GASTRIC ULCER, UNSPECIFIED AS ACUTE OR CHRONIC, WITHOUT HEMO: ICD-10-CM

## 2021-08-09 DIAGNOSIS — K92.1 MELENA: ICD-10-CM

## 2021-08-09 PROCEDURE — 44360 SMALL BOWEL ENDOSCOPY: CPT | Performed by: INTERNAL MEDICINE

## 2021-08-10 ENCOUNTER — INPATIENT HOSPITAL (OUTPATIENT)
Dept: URBAN - METROPOLITAN AREA HOSPITAL 107 | Facility: HOSPITAL | Age: 46
End: 2021-08-10
Payer: COMMERCIAL

## 2021-08-10 DIAGNOSIS — K92.2 GASTROINTESTINAL HEMORRHAGE, UNSPECIFIED: ICD-10-CM

## 2021-08-10 DIAGNOSIS — K92.1 MELENA: ICD-10-CM

## 2021-08-10 DIAGNOSIS — Z99.2 DEPENDENCE ON RENAL DIALYSIS: ICD-10-CM

## 2021-08-10 DIAGNOSIS — D62 ACUTE POSTHEMORRHAGIC ANEMIA: ICD-10-CM

## 2021-08-10 DIAGNOSIS — N18.6 END STAGE RENAL DISEASE: ICD-10-CM

## 2021-08-10 PROCEDURE — 99232 SBSQ HOSP IP/OBS MODERATE 35: CPT | Performed by: PHYSICIAN ASSISTANT

## 2021-08-11 ENCOUNTER — INPATIENT HOSPITAL (OUTPATIENT)
Dept: URBAN - METROPOLITAN AREA HOSPITAL 107 | Facility: HOSPITAL | Age: 46
End: 2021-08-11
Payer: COMMERCIAL

## 2021-08-11 DIAGNOSIS — N18.9 CHRONIC KIDNEY DISEASE, UNSPECIFIED: ICD-10-CM

## 2021-08-11 DIAGNOSIS — K92.1 MELENA: ICD-10-CM

## 2021-08-11 DIAGNOSIS — D50.0 IRON DEFICIENCY ANEMIA SECONDARY TO BLOOD LOSS (CHRONIC): ICD-10-CM

## 2021-08-11 PROCEDURE — 99232 SBSQ HOSP IP/OBS MODERATE 35: CPT | Performed by: INTERNAL MEDICINE

## 2021-09-07 ENCOUNTER — INPATIENT HOSPITAL (OUTPATIENT)
Dept: URBAN - METROPOLITAN AREA HOSPITAL 107 | Facility: HOSPITAL | Age: 46
End: 2021-09-07
Payer: COMMERCIAL

## 2021-09-07 DIAGNOSIS — K29.70 GASTRITIS, UNSPECIFIED, WITHOUT BLEEDING: ICD-10-CM

## 2021-09-07 DIAGNOSIS — K92.0 HEMATEMESIS: ICD-10-CM

## 2021-09-07 PROCEDURE — 43235 EGD DIAGNOSTIC BRUSH WASH: CPT | Performed by: INTERNAL MEDICINE

## 2021-09-08 ENCOUNTER — INPATIENT HOSPITAL (OUTPATIENT)
Dept: URBAN - METROPOLITAN AREA HOSPITAL 107 | Facility: HOSPITAL | Age: 46
End: 2021-09-08
Payer: COMMERCIAL

## 2021-09-08 DIAGNOSIS — K92.0 HEMATEMESIS: ICD-10-CM

## 2021-09-08 DIAGNOSIS — Z99.2 DEPENDENCE ON RENAL DIALYSIS: ICD-10-CM

## 2021-09-08 DIAGNOSIS — N18.6 END STAGE RENAL DISEASE: ICD-10-CM

## 2021-09-08 DIAGNOSIS — R11.2 NAUSEA WITH VOMITING, UNSPECIFIED: ICD-10-CM

## 2021-09-08 PROCEDURE — 99231 SBSQ HOSP IP/OBS SF/LOW 25: CPT | Performed by: PHYSICIAN ASSISTANT

## 2021-09-17 ENCOUNTER — ON CAMPUS - OUTPATIENT (OUTPATIENT)
Dept: URBAN - METROPOLITAN AREA HOSPITAL 108 | Facility: HOSPITAL | Age: 46
End: 2021-09-17
Payer: COMMERCIAL

## 2021-09-17 DIAGNOSIS — R11.0 NAUSEA: ICD-10-CM

## 2021-09-17 DIAGNOSIS — R93.3 ABNORMAL FINDINGS ON DIAGNOSTIC IMAGING OF OTHER PARTS OF DI: ICD-10-CM

## 2021-09-17 DIAGNOSIS — K92.0 HEMATEMESIS: ICD-10-CM

## 2021-09-17 DIAGNOSIS — R10.84 GENERALIZED ABDOMINAL PAIN: ICD-10-CM

## 2021-09-17 DIAGNOSIS — K92.1 MELENA: ICD-10-CM

## 2021-09-17 DIAGNOSIS — R74.8 ABNORMAL LEVELS OF OTHER SERUM ENZYMES: ICD-10-CM

## 2021-09-17 DIAGNOSIS — K31.84 GASTROPARESIS: ICD-10-CM

## 2021-09-17 PROCEDURE — 99213 OFFICE O/P EST LOW 20 MIN: CPT | Performed by: PHYSICIAN ASSISTANT

## 2021-10-13 ENCOUNTER — INPATIENT HOSPITAL (OUTPATIENT)
Dept: URBAN - METROPOLITAN AREA HOSPITAL 107 | Facility: HOSPITAL | Age: 46
End: 2021-10-13
Payer: COMMERCIAL

## 2021-10-13 DIAGNOSIS — Z99.2 DEPENDENCE ON RENAL DIALYSIS: ICD-10-CM

## 2021-10-13 DIAGNOSIS — D50.0 IRON DEFICIENCY ANEMIA SECONDARY TO BLOOD LOSS (CHRONIC): ICD-10-CM

## 2021-10-13 DIAGNOSIS — K92.0 HEMATEMESIS: ICD-10-CM

## 2021-10-13 DIAGNOSIS — N18.6 END STAGE RENAL DISEASE: ICD-10-CM

## 2021-10-13 DIAGNOSIS — K31.84 GASTROPARESIS: ICD-10-CM

## 2021-10-13 DIAGNOSIS — R53.83 OTHER FATIGUE: ICD-10-CM

## 2021-10-13 DIAGNOSIS — R11.0 NAUSEA: ICD-10-CM

## 2021-10-13 DIAGNOSIS — R10.10 UPPER ABDOMINAL PAIN, UNSPECIFIED: ICD-10-CM

## 2021-10-13 DIAGNOSIS — K92.1 MELENA: ICD-10-CM

## 2021-10-13 DIAGNOSIS — R63.0 ANOREXIA: ICD-10-CM

## 2021-10-13 DIAGNOSIS — R06.02 SHORTNESS OF BREATH: ICD-10-CM

## 2021-10-13 PROCEDURE — 99221 1ST HOSP IP/OBS SF/LOW 40: CPT | Performed by: INTERNAL MEDICINE

## 2021-10-14 ENCOUNTER — INPATIENT HOSPITAL (OUTPATIENT)
Dept: URBAN - METROPOLITAN AREA HOSPITAL 107 | Facility: HOSPITAL | Age: 46
End: 2021-10-14
Payer: COMMERCIAL

## 2021-10-14 DIAGNOSIS — D53.9 NUTRITIONAL ANEMIA, UNSPECIFIED: ICD-10-CM

## 2021-10-14 DIAGNOSIS — K92.2 GASTROINTESTINAL HEMORRHAGE, UNSPECIFIED: ICD-10-CM

## 2021-10-14 DIAGNOSIS — R11.2 NAUSEA WITH VOMITING, UNSPECIFIED: ICD-10-CM

## 2021-10-14 DIAGNOSIS — K92.0 HEMATEMESIS: ICD-10-CM

## 2021-10-14 PROCEDURE — 99231 SBSQ HOSP IP/OBS SF/LOW 25: CPT | Performed by: PHYSICIAN ASSISTANT

## 2021-12-02 ENCOUNTER — INPATIENT HOSPITAL (OUTPATIENT)
Dept: URBAN - METROPOLITAN AREA HOSPITAL 107 | Facility: HOSPITAL | Age: 46
End: 2021-12-02
Payer: COMMERCIAL

## 2021-12-02 DIAGNOSIS — K31.84 GASTROPARESIS: ICD-10-CM

## 2021-12-02 DIAGNOSIS — R10.13 EPIGASTRIC PAIN: ICD-10-CM

## 2021-12-02 DIAGNOSIS — K92.2 GASTROINTESTINAL HEMORRHAGE, UNSPECIFIED: ICD-10-CM

## 2021-12-02 DIAGNOSIS — N18.6 END STAGE RENAL DISEASE: ICD-10-CM

## 2021-12-02 DIAGNOSIS — R10.9 UNSPECIFIED ABDOMINAL PAIN: ICD-10-CM

## 2021-12-02 DIAGNOSIS — K92.0 HEMATEMESIS: ICD-10-CM

## 2021-12-02 DIAGNOSIS — D64.89 OTHER SPECIFIED ANEMIAS: ICD-10-CM

## 2021-12-02 PROCEDURE — 99223 1ST HOSP IP/OBS HIGH 75: CPT | Performed by: PHYSICIAN ASSISTANT

## 2021-12-03 ENCOUNTER — INPATIENT HOSPITAL (OUTPATIENT)
Dept: URBAN - METROPOLITAN AREA HOSPITAL 107 | Facility: HOSPITAL | Age: 46
End: 2021-12-03
Payer: COMMERCIAL

## 2021-12-03 DIAGNOSIS — K92.0 HEMATEMESIS: ICD-10-CM

## 2021-12-03 DIAGNOSIS — K29.70 GASTRITIS, UNSPECIFIED, WITHOUT BLEEDING: ICD-10-CM

## 2021-12-03 DIAGNOSIS — K29.80 DUODENITIS WITHOUT BLEEDING: ICD-10-CM

## 2021-12-03 PROCEDURE — 43235 EGD DIAGNOSTIC BRUSH WASH: CPT | Performed by: INTERNAL MEDICINE

## 2021-12-04 ENCOUNTER — INPATIENT HOSPITAL (OUTPATIENT)
Dept: URBAN - METROPOLITAN AREA HOSPITAL 107 | Facility: HOSPITAL | Age: 46
End: 2021-12-04
Payer: COMMERCIAL

## 2021-12-04 DIAGNOSIS — D62 ACUTE POSTHEMORRHAGIC ANEMIA: ICD-10-CM

## 2021-12-04 DIAGNOSIS — N18.6 END STAGE RENAL DISEASE: ICD-10-CM

## 2021-12-04 DIAGNOSIS — K92.0 HEMATEMESIS: ICD-10-CM

## 2021-12-04 PROCEDURE — 99231 SBSQ HOSP IP/OBS SF/LOW 25: CPT | Performed by: INTERNAL MEDICINE

## 2022-01-18 ENCOUNTER — INPATIENT HOSPITAL (OUTPATIENT)
Dept: URBAN - METROPOLITAN AREA HOSPITAL 107 | Facility: HOSPITAL | Age: 47
End: 2022-01-18
Payer: COMMERCIAL

## 2022-01-18 DIAGNOSIS — D64.89 OTHER SPECIFIED ANEMIAS: ICD-10-CM

## 2022-01-18 DIAGNOSIS — R74.8 ABNORMAL LEVELS OF OTHER SERUM ENZYMES: ICD-10-CM

## 2022-01-18 DIAGNOSIS — R10.9 UNSPECIFIED ABDOMINAL PAIN: ICD-10-CM

## 2022-01-18 DIAGNOSIS — K21.9 GASTRO-ESOPHAGEAL REFLUX DISEASE WITHOUT ESOPHAGITIS: ICD-10-CM

## 2022-01-18 DIAGNOSIS — K92.0 HEMATEMESIS: ICD-10-CM

## 2022-01-18 PROCEDURE — 99223 1ST HOSP IP/OBS HIGH 75: CPT | Performed by: PHYSICIAN ASSISTANT

## 2022-01-19 ENCOUNTER — INPATIENT HOSPITAL (OUTPATIENT)
Dept: URBAN - METROPOLITAN AREA HOSPITAL 107 | Facility: HOSPITAL | Age: 47
End: 2022-01-19
Payer: COMMERCIAL

## 2022-01-19 DIAGNOSIS — K92.0 HEMATEMESIS: ICD-10-CM

## 2022-01-19 DIAGNOSIS — R11.2 NAUSEA WITH VOMITING, UNSPECIFIED: ICD-10-CM

## 2022-01-19 DIAGNOSIS — R74.8 ABNORMAL LEVELS OF OTHER SERUM ENZYMES: ICD-10-CM

## 2022-01-19 DIAGNOSIS — D64.9 ANEMIA, UNSPECIFIED: ICD-10-CM

## 2022-01-19 PROCEDURE — 99232 SBSQ HOSP IP/OBS MODERATE 35: CPT | Performed by: PHYSICIAN ASSISTANT

## 2022-02-09 ENCOUNTER — LAB REQUISITION (OUTPATIENT)
Dept: LAB | Facility: HOSPITAL | Age: 47
End: 2022-02-09

## 2022-02-09 DIAGNOSIS — H66.91 OTITIS MEDIA, UNSPECIFIED, RIGHT EAR: ICD-10-CM

## 2022-02-09 PROCEDURE — 87077 CULTURE AEROBIC IDENTIFY: CPT | Performed by: OTOLARYNGOLOGY

## 2022-02-09 PROCEDURE — 87205 SMEAR GRAM STAIN: CPT | Performed by: OTOLARYNGOLOGY

## 2022-02-09 PROCEDURE — 87070 CULTURE OTHR SPECIMN AEROBIC: CPT | Performed by: OTOLARYNGOLOGY

## 2022-02-09 PROCEDURE — 87186 SC STD MICRODIL/AGAR DIL: CPT | Performed by: OTOLARYNGOLOGY

## 2022-02-12 LAB
BACTERIA SPEC AEROBE CULT: ABNORMAL
GRAM STN SPEC: ABNORMAL
GRAM STN SPEC: ABNORMAL

## 2022-03-21 ENCOUNTER — TELEPHONE (OUTPATIENT)
Dept: SURGERY | Facility: CLINIC | Age: 47
End: 2022-03-21

## 2022-04-05 ENCOUNTER — INPATIENT HOSPITAL (OUTPATIENT)
Dept: URBAN - METROPOLITAN AREA HOSPITAL 107 | Facility: HOSPITAL | Age: 47
End: 2022-04-05
Payer: COMMERCIAL

## 2022-04-05 DIAGNOSIS — R19.7 DIARRHEA, UNSPECIFIED: ICD-10-CM

## 2022-04-05 DIAGNOSIS — R10.32 LEFT LOWER QUADRANT PAIN: ICD-10-CM

## 2022-04-05 DIAGNOSIS — K62.5 HEMORRHAGE OF ANUS AND RECTUM: ICD-10-CM

## 2022-04-05 DIAGNOSIS — D64.9 ANEMIA, UNSPECIFIED: ICD-10-CM

## 2022-04-05 DIAGNOSIS — R11.2 NAUSEA WITH VOMITING, UNSPECIFIED: ICD-10-CM

## 2022-04-05 DIAGNOSIS — K59.00 CONSTIPATION, UNSPECIFIED: ICD-10-CM

## 2022-04-05 DIAGNOSIS — R74.8 ABNORMAL LEVELS OF OTHER SERUM ENZYMES: ICD-10-CM

## 2022-04-05 PROCEDURE — 99223 1ST HOSP IP/OBS HIGH 75: CPT | Performed by: NURSE PRACTITIONER

## 2022-04-06 ENCOUNTER — INPATIENT HOSPITAL (OUTPATIENT)
Dept: URBAN - METROPOLITAN AREA HOSPITAL 107 | Facility: HOSPITAL | Age: 47
End: 2022-04-06
Payer: COMMERCIAL

## 2022-04-06 DIAGNOSIS — K62.5 HEMORRHAGE OF ANUS AND RECTUM: ICD-10-CM

## 2022-04-06 DIAGNOSIS — R11.2 NAUSEA WITH VOMITING, UNSPECIFIED: ICD-10-CM

## 2022-04-06 DIAGNOSIS — D64.9 ANEMIA, UNSPECIFIED: ICD-10-CM

## 2022-04-06 PROCEDURE — 99232 SBSQ HOSP IP/OBS MODERATE 35: CPT | Performed by: PHYSICIAN ASSISTANT

## 2022-04-07 PROCEDURE — 99232 SBSQ HOSP IP/OBS MODERATE 35: CPT | Performed by: PHYSICIAN ASSISTANT

## 2022-04-09 ENCOUNTER — INPATIENT HOSPITAL (OUTPATIENT)
Dept: URBAN - METROPOLITAN AREA HOSPITAL 107 | Facility: HOSPITAL | Age: 47
End: 2022-04-09
Payer: COMMERCIAL

## 2022-04-09 DIAGNOSIS — R11.2 NAUSEA WITH VOMITING, UNSPECIFIED: ICD-10-CM

## 2022-04-09 DIAGNOSIS — K59.00 CONSTIPATION, UNSPECIFIED: ICD-10-CM

## 2022-04-09 DIAGNOSIS — K62.5 HEMORRHAGE OF ANUS AND RECTUM: ICD-10-CM

## 2022-04-09 DIAGNOSIS — R10.9 UNSPECIFIED ABDOMINAL PAIN: ICD-10-CM

## 2022-04-09 DIAGNOSIS — R19.7 DIARRHEA, UNSPECIFIED: ICD-10-CM

## 2022-04-09 PROCEDURE — 99232 SBSQ HOSP IP/OBS MODERATE 35: CPT | Performed by: INTERNAL MEDICINE

## 2022-04-10 PROCEDURE — 99232 SBSQ HOSP IP/OBS MODERATE 35: CPT | Performed by: INTERNAL MEDICINE

## 2022-04-11 ENCOUNTER — INPATIENT HOSPITAL (OUTPATIENT)
Dept: URBAN - METROPOLITAN AREA HOSPITAL 107 | Facility: HOSPITAL | Age: 47
End: 2022-04-11
Payer: COMMERCIAL

## 2022-04-11 DIAGNOSIS — D50.9 IRON DEFICIENCY ANEMIA, UNSPECIFIED: ICD-10-CM

## 2022-04-11 DIAGNOSIS — K64.8 OTHER HEMORRHOIDS: ICD-10-CM

## 2022-04-11 DIAGNOSIS — K62.5 HEMORRHAGE OF ANUS AND RECTUM: ICD-10-CM

## 2022-04-11 PROCEDURE — 45378 DIAGNOSTIC COLONOSCOPY: CPT | Mod: 52 | Performed by: INTERNAL MEDICINE

## 2022-04-12 ENCOUNTER — INPATIENT HOSPITAL (OUTPATIENT)
Dept: URBAN - METROPOLITAN AREA HOSPITAL 107 | Facility: HOSPITAL | Age: 47
End: 2022-04-12
Payer: COMMERCIAL

## 2022-04-12 DIAGNOSIS — D64.9 ANEMIA, UNSPECIFIED: ICD-10-CM

## 2022-04-12 DIAGNOSIS — R11.2 NAUSEA WITH VOMITING, UNSPECIFIED: ICD-10-CM

## 2022-04-12 DIAGNOSIS — K62.5 HEMORRHAGE OF ANUS AND RECTUM: ICD-10-CM

## 2022-04-12 DIAGNOSIS — R19.5 OTHER FECAL ABNORMALITIES: ICD-10-CM

## 2022-04-12 PROCEDURE — 99232 SBSQ HOSP IP/OBS MODERATE 35: CPT | Performed by: PHYSICIAN ASSISTANT

## 2022-04-14 PROCEDURE — 99231 SBSQ HOSP IP/OBS SF/LOW 25: CPT | Performed by: PHYSICIAN ASSISTANT

## 2023-02-21 NOTE — PROGRESS NOTES
PULMONARY/ CRITICAL CARE PROGRESS NOTE        Patient Name:  Maura Garibay    :  1975    Medical Record:  [id number]    PRIMARY CARE PHYSICIAN     Danial Landeros MD [facility]  Maura Garibay is a 45 y.o. female with a PMH significant for ESRD on HD MWF, seizures, hypertension, GERD, hyperparathyroidism, IBS, anemia, hyperphosphatemia, HFpEF, depression, TTP, anxiety/depression and tobacco abuse who presented to the ER after being found by her neighbor shaking with seizure like activity.  EMS was called and when they arrived she was some jerking of her extremities.  Upon arrival to the ER she was evaluated and had seizure like activity and was given two doses of Ativan and a gram of Keppra.  Currently patient is unable to provide details regarding her medical history due to being obtunded.        :  No new issues.  No seizures since admitted.  Awakens with voice, but not oriented.  :  No issues overnight.  No seizure activity seen.  Awakens to voice and follows commands today.  On Cardene gtt   :  More awake today, but is confused and combative.  On D10 and Cardene gtt   9/10:  Off Cardene gtt.  Receiving HD today.  Remains with hypoglycemia and is on D10 gtt.  More awake, but confused.  Some delusions.  She is refusing treatments and medications   no SOA/CP      REVIEW OF SYSTEMS    Difficult to obtain due to mental status    HOME MEDICATIONS  Amitriptyline (ELAVIL) 25 MG tablet qhs    Aspirin 81 MG qday    AURYXIA 1  MG(Fe) tablet two tablets with meals    calcitriol (ROCALTROL) 0.25 MCG capsule 1 capsule by mouth 3 (three) times a week     cinacalcet (SENSIPAR) 30 MG tablet  by mouth 3 (three) times a week    cloNIDine (CATAPRES) 0.2 mg by mouth 3 (three) times daily     dilTIAZem ER beads (TIAZAC) 240 MG 24 hr capsule QD    diphenhydrAMINE (BENADRYL) 50 MG   every 4 (four) hours as needed for Itching    docusate sodium (COLACE) 100 MG capsule QD    escitalopram (LEXAPRO) 10 MG tablet  QD    esomeprazole (NEXIUM) 40 MG QD    gabapentin (NEURONTIN) 600 MG tablet mg 4 (four) times daily    hydrALAZINE (APRESOLINE) 100 MG tablet BID    lacosamide (VIMPAT) 100 mg BID    levETIRAcetam 500 mg BID    linaclotide (LINZESS) 290 MCG QD    metoclopramide 5 MG one tablet four times daily before meals and nightly    promethazine 25 mg by mouth every 6 hours as needed for nausea    Requip 0.25 MG tablet nightly    TRAZODONE HCL PO 50 mg nightly    MEDICAL HISTORY    Past Medical History:   Diagnosis Date   • (HFpEF) heart failure with preserved ejection fraction (CMS/HCC)    • Acid reflux    • Anxiety and depression    • Cardiomyopathy (CMS/HCC)    • Chronic pain    • Dependence on renal dialysis (CMS/HCC)    • Hyperparathyroidism (CMS/HCC)    • Hypertension    • IBS (irritable bowel syndrome)    • Migraines    • Neuropathy    • Pulmonary emboli (CMS/HCC)    • Renal failure    • Seizures (CMS/HCC)    • TTP (thrombotic thrombocytopenic purpura) (CMS/HCC)         SURGICAL HISTORY    Past Surgical History:   Procedure Laterality Date   • APPENDECTOMY     • ARTERIOVENOUS FISTULA     • BRAIN SURGERY      Fistula placed   • BRAIN SURGERY      Cyst   • CHOLECYSTECTOMY     • COLONOSCOPY     • HYSTERECTOMY     • SALPINGO OOPHORECTOMY     • TONSILLECTOMY          FAMILY HISTORY    Family History   Problem Relation Age of Onset   • Kidney disease Mother    • Hypertension Mother    • Heart disease Father    • Heart disease Sister    • Heart disease Brother         SOCIAL HISTORY    Social History     Socioeconomic History   • Marital status:      Spouse name: Not on file   • Number of children: Not on file   • Years of education: Not on file   • Highest education level: Not on file   Tobacco Use   • Smoking status: Current Every Day Smoker     Packs/day: 0.50   Substance and Sexual Activity   • Alcohol use: No   • Drug use: No        ALLERGIES    Allergies   Allergen Reactions   • Butorphanol Hives   • Codeine    •  Contrast Dye      IV CONTRAST   • Golytely [Peg 3350-Electrolytes] Unknown - Low Severity   • Haldol [Haloperidol] Hives   • Morphine And Related    • Sulfa Antibiotics    • Toradol [Ketorolac Tromethamine]    • Vancomycin Hives         PHYSICAL EXAM    tMax 24 hrs:  Temp (24hrs), Av.6 °F (37 °C), Min:98.3 °F (36.8 °C), Max:99.1 °F (37.3 °C)    Vitals Ranges:  Temp:  [98.3 °F (36.8 °C)-99.1 °F (37.3 °C)] 98.6 °F (37 °C)  Heart Rate:  [] 80  Resp:  [17-26] 17  BP: (117-217)/() 142/99  Intake and Output Last 3 Shifts:  I/O last 3 completed shifts:  In: 490 [I.V.:340; IV Piggyback:150]  Out: 3400 [Other:3400]    Intake/Output Summary (Last 24 hours) at 2020 0947  Last data filed at 9/10/2020 2304  Gross per 24 hour   Intake 150 ml   Output 3400 ml   Net -3250 ml       Constitutional:  NAD  HEENT:   Atraumatic, PERRL, conjunctiva normal, moist oral mucosa, no nasal discharge.  Trachea is midline.  Respiratory:   No respiratory distress, normal breath sounds, no rales, no wheezing   Cardiovascular:  Tachy. Pulses 2+ and equal in all four extremities.   HARSH fistula with positive thrill and bruit.    GI:   Soft, nondistended, positive bowel sounds.  Extremities:   No edema, cyanosis or tenderness.  Integument:   No rashes  Neurologic:  Awake, intermittently confused.  Some delusions.  Calm      LABS    Lab Results (last 24 hours)     Procedure Component Value Units Date/Time    BUN [[id number]]  (Normal) Collected:  20 0325    Specimen:  Blood Updated:  20 0725     BUN 13 mg/dL     POC Glucose Once [[id number]]  (Normal) Collected:  20 0556    Specimen:  Blood Updated:  20 0608     Glucose 90 mg/dL      Comment: Serial Number: [id number]:  [id number]       Blood Culture - Blood, Blood, Central Line [[id number]] Collected:  20 0448    Specimen:  Blood, Central Line Updated:  20 0500     Blood Culture No growth at 4 days    Comprehensive Metabolic Panel [[id number]]   (Abnormal) Collected:  09/11/20 0325    Specimen:  Blood Updated:  09/11/20 0452     Glucose 109 mg/dL      BUN --     Comment: Testing performed by alternate method        Creatinine 3.87 mg/dL      Sodium 136 mmol/L      Potassium 3.7 mmol/L      Chloride 93 mmol/L      CO2 28.0 mmol/L      Calcium 9.1 mg/dL      Total Protein 6.5 g/dL      Albumin 3.60 g/dL      ALT (SGPT) <5 U/L      AST (SGOT) 14 U/L      Alkaline Phosphatase 270 U/L      Total Bilirubin 0.5 mg/dL      eGFR Non African Amer 13 mL/min/1.73      Comment: <15 Indicative of kidney failure.        eGFR   Amer --     Comment: <15 Indicative of kidney failure.        Globulin 2.9 gm/dL      A/G Ratio 1.2 g/dL      BUN/Creatinine Ratio --     Comment: Testing not performed        Anion Gap 15.0 mmol/L     Narrative:       GFR Normal >60  Chronic Kidney Disease <60  Kidney Failure <15      Magnesium [[id number]]  (Normal) Collected:  09/11/20 0325    Specimen:  Blood Updated:  09/11/20 0438     Magnesium 1.9 mg/dL     Phosphorus [[id number]]  (Normal) Collected:  09/11/20 0325    Specimen:  Blood Updated:  09/11/20 0438     Phosphorus 4.3 mg/dL     Protime-INR [[id number]]  (Abnormal) Collected:  09/11/20 0325    Specimen:  Blood Updated:  09/11/20 0419     Protime 12.5 Seconds      INR 1.16    aPTT [[id number]]  (Normal) Collected:  09/11/20 0325    Specimen:  Blood Updated:  09/11/20 0419     PTT 30.1 seconds     CBC & Differential [[id number]] Collected:  09/11/20 0325    Specimen:  Blood Updated:  09/11/20 0418    Narrative:       The following orders were created for panel order CBC & Differential.  Procedure                               Abnormality         Status                     ---------                               -----------         ------                     CBC Auto Differential[[id number]]        Abnormal            Final result                 Please view results for these tests on the individual orders.    CBC Auto Differential  [[id number]]  (Abnormal) Collected:  09/11/20 0325    Specimen:  Blood Updated:  09/11/20 0418     WBC 5.00 10*3/mm3      RBC 3.39 10*6/mm3      Hemoglobin 10.2 g/dL      Hematocrit 31.1 %      MCV 91.9 fL      MCH 30.2 pg      MCHC 32.8 g/dL      RDW 17.6 %      RDW-SD 56.4 fl      MPV 9.1 fL      Platelets 161 10*3/mm3      Neutrophil % 72.4 %      Lymphocyte % 17.0 %      Monocyte % 8.4 %      Eosinophil % 1.8 %      Basophil % 0.4 %      Neutrophils, Absolute 3.70 10*3/mm3      Lymphocytes, Absolute 0.90 10*3/mm3      Monocytes, Absolute 0.40 10*3/mm3      Eosinophils, Absolute 0.10 10*3/mm3      Basophils, Absolute 0.00 10*3/mm3      nRBC 0.1 /100 WBC     POC Glucose Once [[id number]]  (Normal) Collected:  09/11/20 0052    Specimen:  Blood Updated:  09/11/20 0053     Glucose 83 mg/dL      Comment: Serial Number: [id number]:  [id number]       Blood Culture - Blood, Hand, Left [[id number]] Collected:  09/06/20 2349    Specimen:  Blood from Hand, Left Updated:  09/11/20 0045     Blood Culture No growth at 4 days    Blood Culture - Blood, Blood, Central Line [[id number]] Collected:  09/06/20 2351    Specimen:  Blood, Central Line Updated:  09/11/20 0015     Blood Culture No growth at 4 days    POC Glucose Once [[id number]]  (Normal) Collected:  09/10/20 1706    Specimen:  Blood Updated:  09/10/20 1707     Glucose 102 mg/dL      Comment: Serial Number: [id number]:  [id number]       POC Glucose Once [[id number]]  (Abnormal) Collected:  09/10/20 1121    Specimen:  Blood Updated:  09/10/20 1131     Glucose 107 mg/dL      Comment: Serial Number: [id number]:  [id number]             MICRO:  Microbiology Results (last 10 days)     Procedure Component Value - Date/Time    Blood Culture - Blood, Blood, Central Line [[id number]] Collected:  09/07/20 0448    Lab Status:  Preliminary result Specimen:  Blood, Central Line Updated:  09/11/20 0500     Blood Culture No growth at 4 days    Blood Culture - Blood, Blood,  Central Line [[id number]] Collected:  09/06/20 2351    Lab Status:  Preliminary result Specimen:  Blood, Central Line Updated:  09/11/20 0015     Blood Culture No growth at 4 days    MRSA Screen, PCR (Inpatient) - Swab, Nares [[id number]]  (Normal) Collected:  09/06/20 2350    Lab Status:  Final result Specimen:  Swab from Nares Updated:  09/07/20 0256     MRSA PCR No MRSA Detected    Blood Culture - Blood, Hand, Left [[id number]] Collected:  09/06/20 2349    Lab Status:  Preliminary result Specimen:  Blood from Hand, Left Updated:  09/11/20 0045     Blood Culture No growth at 4 days    COVID-19,[facility] IN-HOUSE,Nasal Swab No Transport Media 3-4 HR TAT - Swab, Nasal Cavity [[id number]]  (Normal) Collected:  09/06/20 2148    Lab Status:  Final result Specimen:  Swab from Nasal Cavity Updated:  09/06/20 2226     COVID19 Not Detected    Narrative:       Fact sheet for providers: https://www.fda.gov/media/375624/download     Fact sheet for patients: https://www.fda.gov/media/689757/download          IMAGING & OTHER STUDIES    Imaging Results (Last 72 Hours)     ** No results found for the last 72 hours. **            ASSESSMENT/PLAN  Seizure (CMS/[facility])  -Patient with history of same  -Keppra and Vimpat at outpatient  -?compliance  -Keppra 500 mg IV bid and Vimapt 100 mg IV BID and switch to oral when appropriate    Hypertensive emergency  -/129 on admission  -Cardene gtt off  -Clonidine patch added by renal    Pneumonia  -? aspiration  -Given Cefepime and Clindamycin given h/o seizures. Now on Unasyn.  -COVID19 swab Negative  -MRSA nasal swab Negative    Encephalopathy  -CT head negative for acute findings  -? postictal state vs hypertensive encephalopathy  -APAP < 5  -psych eval    ESRD with HD dependence  -Will consult Dr. Tello = sees Dr. Rod Ch  -HD per Renal.     Anemia  -Hemoglobin 10.6 on admission  -No signs of active bleeding  -EGD 07/14/20 at [place] without acute  findings    Hyperparathyroidism  -Continue Sensipar and Calcitriol per renal    Hyperphosphatemia  -Continue phos binders per renal    Anxiety/depression  -Continue Lexapro and Requip      PUD: Protonix  Insulin:   Sliding scale  VTE:   SCDs/  Lovenox  Nutrition:  Diet ordered          show